# Patient Record
Sex: FEMALE | Race: WHITE | NOT HISPANIC OR LATINO | URBAN - METROPOLITAN AREA
[De-identification: names, ages, dates, MRNs, and addresses within clinical notes are randomized per-mention and may not be internally consistent; named-entity substitution may affect disease eponyms.]

---

## 2017-07-23 ENCOUNTER — EMERGENCY (EMERGENCY)
Facility: HOSPITAL | Age: 82
LOS: 1 days | Discharge: HOME | End: 2017-07-23
Attending: EMERGENCY MEDICINE | Admitting: EMERGENCY MEDICINE
Payer: MEDICARE

## 2017-07-23 VITALS
OXYGEN SATURATION: 100 % | HEART RATE: 115 BPM | TEMPERATURE: 98 F | SYSTOLIC BLOOD PRESSURE: 149 MMHG | RESPIRATION RATE: 19 BRPM | DIASTOLIC BLOOD PRESSURE: 96 MMHG

## 2017-07-23 DIAGNOSIS — Z98.42 CATARACT EXTRACTION STATUS, LEFT EYE: Chronic | ICD-10-CM

## 2017-07-23 DIAGNOSIS — Z90.49 ACQUIRED ABSENCE OF OTHER SPECIFIED PARTS OF DIGESTIVE TRACT: Chronic | ICD-10-CM

## 2017-07-23 PROCEDURE — 21480 CLTX TMPRMAND DISLC 1ST/SBSQ: CPT

## 2017-07-23 PROCEDURE — 99283 EMERGENCY DEPT VISIT LOW MDM: CPT | Mod: 25

## 2017-07-23 PROCEDURE — 70110 X-RAY EXAM OF JAW 4/> VIEWS: CPT | Mod: 26

## 2017-07-23 PROCEDURE — 99284 EMERGENCY DEPT VISIT MOD MDM: CPT | Mod: 25,GC

## 2017-07-23 PROCEDURE — 21480 CLTX TMPRMAND DISLC 1ST/SBSQ: CPT | Mod: 50,GC

## 2017-07-23 PROCEDURE — 70110 X-RAY EXAM OF JAW 4/> VIEWS: CPT

## 2017-07-23 RX ORDER — ONDANSETRON 8 MG/1
4 TABLET, FILM COATED ORAL ONCE
Qty: 0 | Refills: 0 | Status: DISCONTINUED | OUTPATIENT
Start: 2017-07-23 | End: 2017-07-23

## 2017-07-23 NOTE — ED PROVIDER NOTE - CONSTITUTIONAL, MLM
normal... Well appearing, well nourished, awake, alert, oriented to person, place, time/situation and in moderate pain.

## 2017-07-23 NOTE — ED PROVIDER NOTE - OBJECTIVE STATEMENT
82 y/o female p/w jaw dislocatioin. BIBA. Per patient, yawned a little while ago and felt jaw pop. Has happened in the past. Reports severe pain. 82 y/o female p/w jaw dislocation. BIBA. Per patient, yawned a little while ago and felt jaw pop. Has happened in the past. Reports severe pain. Mouth held open. No trauma.

## 2017-07-23 NOTE — ED ADULT NURSE NOTE - OBJECTIVE STATEMENT
0349 pt 83y f aox4 bib [place] c/o jaw dislocation states she yawned and dislocated her jaw w/ pmhx, pt states ortho puts it back but tonight pt in excruciating pain pending to be seen/vss /pending eval/gcruz

## 2017-07-23 NOTE — ED PROVIDER NOTE - ATTENDING CONTRIBUTION TO CARE
Attending MD Avila:  I personally have seen and examined this patient.  Resident note reviewed and agree on plan of care and except where noted.  See MDM for details.

## 2017-07-23 NOTE — ED PROVIDER NOTE - PROGRESS NOTE DETAILS
patient became nauseous after jaw relocated - reports jaw feels relocated, w/ reduced pain - will check XR XR shows jaw relocated - patient feels well w/ no pain - declined zofran b/c nausea resolved - safe to d/c home

## 2017-07-23 NOTE — ED PROVIDER NOTE - PLAN OF CARE
1) Please return to the ED should you have any new or worsening symptoms, worsening pain, develop inability to open or close your mouth or any concerning symptoms  2) Please follow up with your primary care doctor in 2-3 days.

## 2017-07-23 NOTE — ED PROVIDER NOTE - PMH
Anxiety    Depression    Diverticulitis    Hyperlipidemia    Macular degeneration  right eye  MVP (mitral valve prolapse)    OA (osteoarthritis)  knee and hip

## 2017-07-23 NOTE — ED PROVIDER NOTE - MEDICAL DECISION MAKING DETAILS
Christy resident: 82 y/o p/w acute jaw discloation - happened once before in the past - happened today when she yawned - jaw stuck open with jaw visiably dislocated - will try to relocate w/o sedation and obtain f/u xr Christy resident: 84 y/o p/w acute jaw discloation - happened once before in the past - happened today when she yawned - jaw stuck open with jaw visiably dislocated - will try to relocate w/o sedation and obtain f/u xr    84 y/o female with hx of Jaw dislocation here with yet another dislocation. Patient reports she was yawning and felt her jaw popped and got locked. She has been unable to close her mouth since. Exam shows a clearly dislocated jaw and patient with trismus. NO airway compromised. Will reduce the dislocated joint and reassessed. Christy resident: 82 y/o p/w acute jaw discloation - happened once before in the past - happened today when she yawned - jaw stuck open with jaw visiably dislocated - will try to relocate w/o sedation and obtain f/u xr    Austin CANDELARIO: 82 y/o female with hx of Jaw dislocation here with yet another dislocation. Patient reports she was yawning and felt her jaw popped and got locked. She has been unable to close her mouth since. Exam shows a clearly dislocated jaw and patient with trismus. NO airway compromised. Will reduce the dislocated joint and reassessed.

## 2017-07-23 NOTE — ED PROVIDER NOTE - CARE PLAN
Principal Discharge DX:	Jaw dislocation, initial encounter Principal Discharge DX:	Jaw dislocation, initial encounter  Instructions for follow-up, activity and diet:	1) Please return to the ED should you have any new or worsening symptoms, worsening pain, develop inability to open or close your mouth or any concerning symptoms  2) Please follow up with your primary care doctor in 2-3 days.

## 2017-08-05 ENCOUNTER — EMERGENCY (EMERGENCY)
Facility: HOSPITAL | Age: 82
LOS: 1 days | Discharge: HOME | End: 2017-08-05
Attending: EMERGENCY MEDICINE | Admitting: INTERNAL MEDICINE
Payer: MEDICARE

## 2017-08-05 VITALS
DIASTOLIC BLOOD PRESSURE: 88 MMHG | SYSTOLIC BLOOD PRESSURE: 149 MMHG | RESPIRATION RATE: 18 BRPM | HEART RATE: 99 BPM | OXYGEN SATURATION: 99 % | TEMPERATURE: 98 F

## 2017-08-05 DIAGNOSIS — R07.9 CHEST PAIN, UNSPECIFIED: ICD-10-CM

## 2017-08-05 DIAGNOSIS — Z90.49 ACQUIRED ABSENCE OF OTHER SPECIFIED PARTS OF DIGESTIVE TRACT: Chronic | ICD-10-CM

## 2017-08-05 DIAGNOSIS — Z98.42 CATARACT EXTRACTION STATUS, LEFT EYE: Chronic | ICD-10-CM

## 2017-08-05 LAB
ALBUMIN SERPL ELPH-MCNC: 4.5 G/DL — SIGNIFICANT CHANGE UP (ref 3.3–5)
ALP SERPL-CCNC: 113 U/L — SIGNIFICANT CHANGE UP (ref 40–120)
ALT FLD-CCNC: 13 U/L RC — SIGNIFICANT CHANGE UP (ref 10–45)
ANION GAP SERPL CALC-SCNC: 15 MMOL/L — SIGNIFICANT CHANGE UP (ref 5–17)
APPEARANCE UR: CLEAR — SIGNIFICANT CHANGE UP
AST SERPL-CCNC: 21 U/L — SIGNIFICANT CHANGE UP (ref 10–40)
BASOPHILS # BLD AUTO: 0 K/UL — SIGNIFICANT CHANGE UP (ref 0–0.2)
BASOPHILS NFR BLD AUTO: 0.5 % — SIGNIFICANT CHANGE UP (ref 0–2)
BILIRUB SERPL-MCNC: 0.7 MG/DL — SIGNIFICANT CHANGE UP (ref 0.2–1.2)
BILIRUB UR-MCNC: NEGATIVE — SIGNIFICANT CHANGE UP
BUN SERPL-MCNC: 15 MG/DL — SIGNIFICANT CHANGE UP (ref 7–23)
CALCIUM SERPL-MCNC: 9.2 MG/DL — SIGNIFICANT CHANGE UP (ref 8.4–10.5)
CHLORIDE SERPL-SCNC: 103 MMOL/L — SIGNIFICANT CHANGE UP (ref 96–108)
CK MB CFR SERPL CALC: 1.8 NG/ML — SIGNIFICANT CHANGE UP (ref 0–3.8)
CK MB CFR SERPL CALC: 2.3 NG/ML — SIGNIFICANT CHANGE UP (ref 0–3.8)
CK SERPL-CCNC: 43 U/L — SIGNIFICANT CHANGE UP (ref 25–170)
CK SERPL-CCNC: 54 U/L — SIGNIFICANT CHANGE UP (ref 25–170)
CO2 SERPL-SCNC: 24 MMOL/L — SIGNIFICANT CHANGE UP (ref 22–31)
COLOR SPEC: YELLOW — SIGNIFICANT CHANGE UP
CREAT SERPL-MCNC: 0.7 MG/DL — SIGNIFICANT CHANGE UP (ref 0.5–1.3)
DIFF PNL FLD: ABNORMAL
EOSINOPHIL # BLD AUTO: 0.2 K/UL — SIGNIFICANT CHANGE UP (ref 0–0.5)
EOSINOPHIL NFR BLD AUTO: 3 % — SIGNIFICANT CHANGE UP (ref 0–6)
GLUCOSE SERPL-MCNC: 87 MG/DL — SIGNIFICANT CHANGE UP (ref 70–99)
GLUCOSE UR QL: NEGATIVE — SIGNIFICANT CHANGE UP
HCT VFR BLD CALC: 40.8 % — SIGNIFICANT CHANGE UP (ref 34.5–45)
HGB BLD-MCNC: 13.9 G/DL — SIGNIFICANT CHANGE UP (ref 11.5–15.5)
KETONES UR-MCNC: ABNORMAL
LEUKOCYTE ESTERASE UR-ACNC: NEGATIVE — SIGNIFICANT CHANGE UP
LYMPHOCYTES # BLD AUTO: 1.6 K/UL — SIGNIFICANT CHANGE UP (ref 1–3.3)
LYMPHOCYTES # BLD AUTO: 22.9 % — SIGNIFICANT CHANGE UP (ref 13–44)
MCHC RBC-ENTMCNC: 30.8 PG — SIGNIFICANT CHANGE UP (ref 27–34)
MCHC RBC-ENTMCNC: 33.9 GM/DL — SIGNIFICANT CHANGE UP (ref 32–36)
MCV RBC AUTO: 90.7 FL — SIGNIFICANT CHANGE UP (ref 80–100)
MONOCYTES # BLD AUTO: 1 K/UL — HIGH (ref 0–0.9)
MONOCYTES NFR BLD AUTO: 15.2 % — HIGH (ref 2–14)
NEUTROPHILS # BLD AUTO: 4 K/UL — SIGNIFICANT CHANGE UP (ref 1.8–7.4)
NEUTROPHILS NFR BLD AUTO: 58.3 % — SIGNIFICANT CHANGE UP (ref 43–77)
NITRITE UR-MCNC: NEGATIVE — SIGNIFICANT CHANGE UP
PH UR: 5.5 — SIGNIFICANT CHANGE UP (ref 5–8)
PLATELET # BLD AUTO: 169 K/UL — SIGNIFICANT CHANGE UP (ref 150–400)
POTASSIUM SERPL-MCNC: 4 MMOL/L — SIGNIFICANT CHANGE UP (ref 3.5–5.3)
POTASSIUM SERPL-SCNC: 4 MMOL/L — SIGNIFICANT CHANGE UP (ref 3.5–5.3)
PROT SERPL-MCNC: 7.3 G/DL — SIGNIFICANT CHANGE UP (ref 6–8.3)
PROT UR-MCNC: NEGATIVE — SIGNIFICANT CHANGE UP
RBC # BLD: 4.5 M/UL — SIGNIFICANT CHANGE UP (ref 3.8–5.2)
RBC # FLD: 12.5 % — SIGNIFICANT CHANGE UP (ref 10.3–14.5)
SODIUM SERPL-SCNC: 142 MMOL/L — SIGNIFICANT CHANGE UP (ref 135–145)
SP GR SPEC: 1.02 — SIGNIFICANT CHANGE UP (ref 1.01–1.02)
TROPONIN T SERPL-MCNC: <0.01 NG/ML — SIGNIFICANT CHANGE UP (ref 0–0.06)
TROPONIN T SERPL-MCNC: <0.01 NG/ML — SIGNIFICANT CHANGE UP (ref 0–0.06)
UROBILINOGEN FLD QL: NEGATIVE — SIGNIFICANT CHANGE UP
WBC # BLD: 6.8 K/UL — SIGNIFICANT CHANGE UP (ref 3.8–10.5)
WBC # FLD AUTO: 6.8 K/UL — SIGNIFICANT CHANGE UP (ref 3.8–10.5)

## 2017-08-05 RX ORDER — METOPROLOL TARTRATE 50 MG
25 TABLET ORAL
Qty: 0 | Refills: 0 | Status: DISCONTINUED | OUTPATIENT
Start: 2017-08-05 | End: 2017-08-06

## 2017-08-05 RX ORDER — ASPIRIN/CALCIUM CARB/MAGNESIUM 324 MG
81 TABLET ORAL DAILY
Qty: 0 | Refills: 0 | Status: DISCONTINUED | OUTPATIENT
Start: 2017-08-05 | End: 2017-08-06

## 2017-08-05 RX ORDER — ENOXAPARIN SODIUM 100 MG/ML
40 INJECTION SUBCUTANEOUS DAILY
Qty: 0 | Refills: 0 | Status: DISCONTINUED | OUTPATIENT
Start: 2017-08-05 | End: 2017-08-06

## 2017-08-05 RX ORDER — HYDROMORPHONE HYDROCHLORIDE 2 MG/ML
2 INJECTION INTRAMUSCULAR; INTRAVENOUS; SUBCUTANEOUS
Qty: 0 | Refills: 0 | Status: DISCONTINUED | OUTPATIENT
Start: 2017-08-05 | End: 2017-08-06

## 2017-08-05 RX ORDER — SODIUM CHLORIDE 9 MG/ML
500 INJECTION INTRAMUSCULAR; INTRAVENOUS; SUBCUTANEOUS ONCE
Qty: 0 | Refills: 0 | Status: COMPLETED | OUTPATIENT
Start: 2017-08-05 | End: 2017-08-05

## 2017-08-05 RX ORDER — ASPIRIN/CALCIUM CARB/MAGNESIUM 324 MG
324 TABLET ORAL ONCE
Qty: 0 | Refills: 0 | Status: COMPLETED | OUTPATIENT
Start: 2017-08-05 | End: 2017-08-05

## 2017-08-05 RX ORDER — SENNA PLUS 8.6 MG/1
2 TABLET ORAL AT BEDTIME
Qty: 0 | Refills: 0 | Status: DISCONTINUED | OUTPATIENT
Start: 2017-08-05 | End: 2017-08-06

## 2017-08-05 RX ORDER — SIMVASTATIN 20 MG/1
20 TABLET, FILM COATED ORAL AT BEDTIME
Qty: 0 | Refills: 0 | Status: DISCONTINUED | OUTPATIENT
Start: 2017-08-05 | End: 2017-08-06

## 2017-08-05 RX ADMIN — HYDROMORPHONE HYDROCHLORIDE 2 MILLIGRAM(S): 2 INJECTION INTRAMUSCULAR; INTRAVENOUS; SUBCUTANEOUS at 21:00

## 2017-08-05 RX ADMIN — SODIUM CHLORIDE 1000 MILLILITER(S): 9 INJECTION INTRAMUSCULAR; INTRAVENOUS; SUBCUTANEOUS at 17:19

## 2017-08-05 RX ADMIN — SIMVASTATIN 20 MILLIGRAM(S): 20 TABLET, FILM COATED ORAL at 21:00

## 2017-08-05 RX ADMIN — Medication 324 MILLIGRAM(S): at 14:53

## 2017-08-05 RX ADMIN — Medication 0.5 MILLIGRAM(S): at 21:00

## 2017-08-05 RX ADMIN — Medication 25 MILLIGRAM(S): at 21:00

## 2017-08-05 RX ADMIN — HYDROMORPHONE HYDROCHLORIDE 2 MILLIGRAM(S): 2 INJECTION INTRAMUSCULAR; INTRAVENOUS; SUBCUTANEOUS at 21:30

## 2017-08-05 RX ADMIN — ENOXAPARIN SODIUM 40 MILLIGRAM(S): 100 INJECTION SUBCUTANEOUS at 21:00

## 2017-08-05 RX ADMIN — Medication 0.5 MILLIGRAM(S): at 17:19

## 2017-08-05 NOTE — H&P ADULT - NSHPLABSRESULTS_GEN_ALL_CORE
LABS:                        13.9   6.8   )-----------( 169      ( 05 Aug 2017 14:09 )             40.8     08-05    142  |  103  |  15  ----------------------------<  87  4.0   |  24  |  0.70    Ca    9.2      05 Aug 2017 14:09    TPro  7.3  /  Alb  4.5  /  TBili  0.7  /  DBili  x   /  AST  21  /  ALT  13  /  AlkPhos  113  08-05      Urinalysis Basic - ( 05 Aug 2017 15:31 )    Color: Yellow / Appearance: Clear / S.018 / pH: x  Gluc: x / Ketone: Moderate  / Bili: Negative / Urobili: Negative   Blood: x / Protein: Negative / Nitrite: Negative   Leuk Esterase: Negative / RBC: 2-5 /HPF / WBC 2-5 /HPF   Sq Epi: x / Non Sq Epi: x / Bacteria: x          RADIOLOGY & ADDITIONAL TESTS:

## 2017-08-05 NOTE — H&P ADULT - NSHPPHYSICALEXAM_GEN_ALL_CORE
PHYSICAL EXAMINATION:  Vital Signs Last 24 Hrs  T(C): 36.8 (05 Aug 2017 17:51), Max: 36.8 (05 Aug 2017 17:51)  T(F): 98.2 (05 Aug 2017 17:51), Max: 98.2 (05 Aug 2017 17:51)  HR: 88 (05 Aug 2017 17:51) (88 - 99)  BP: 161/90 (05 Aug 2017 17:51) (149/88 - 161/90)  BP(mean): --  RR: 16 (05 Aug 2017 17:51) (16 - 18)  SpO2: 96% (05 Aug 2017 17:51) (96% - 99%)  CAPILLARY BLOOD GLUCOSE            GENERAL: NAD, well-groomed, well-developed  HEAD:  atraumatic, normocephalic  EYES: sclera anicteric  ENMT: mucous membranes moist  NECK: supple, No JVD  CHEST/LUNG: clear to auscultation bilaterally; no rales, rhonchi, or wheezing b/l  HEART: normal S1, S2  ABDOMEN: BS+, soft, ND, NT   EXTREMITIES:  pulses palpable; no clubbing, cyanosis, or edema b/l LEs  NEURO: awake, alert, interactive; moves all extremities  SKIN: no rashes or lesions

## 2017-08-05 NOTE — ED PROVIDER NOTE - ATTENDING CONTRIBUTION TO CARE
Patient presenting with L sided chest pain radiating to back and arm.  Some associated shortness of breath and weakness.  No history of chest pains in past, no known CAD history.    On exam patient vital signs within normal limits, RRR S1/S2, equal pulses and blood pressures in bilateral UE, lungs clear, abdomen soft, non tender, non distended.    EKG non ischemic.  No clinical evidence of aortic dissection, possible ACS, plan for labs, CE, CXR, admit.

## 2017-08-05 NOTE — ED PROVIDER NOTE - OBJECTIVE STATEMENT
83F with PMH of mitral valve disease and depression presenting with one day of 83F with PMH of mitral valve disease and depression presenting with one day of left sided chest pain. The patient reports that the night before presentation she developed sudden sharp left sided chest pain that radiated to her back and left arm. She took some Dilaudid and the pain improved but was still present this morning. The pain is described as sharp and sometimes cramping and constant. The pain is worse with movement. She reports increased shortness of breath with exertion today. Denies any nausea/vomiting, fever/chills, abdominal pain, dysuria and pain or swelling in lower extremities.

## 2017-08-05 NOTE — H&P ADULT - NSHPREVIEWOFSYSTEMS_GEN_ALL_CORE
REVIEW OF SYSTEMS:    CONSTITUTIONAL: No weakness, fevers or chills  EYES/ENT: No visual changes;  No vertigo or throat pain   NECK: No pain or stiffness  RESPIRATORY: No cough, wheezing, hemoptysis; No shortness of breath  CARDIOVASCULAR: chest pain  GASTROINTESTINAL: No abdominal or epigastric pain. No nausea, vomiting, or hematemesis; No diarrhea or constipation. No melena or hematochezia.  GENITOURINARY: No dysuria, frequency or hematuria  NEUROLOGICAL: No numbness or weakness  SKIN: No itching, rashes

## 2017-08-05 NOTE — H&P ADULT - HISTORY OF PRESENT ILLNESS
: 83F with PMH of mitral valve disease and depression presenting with one day of left sided chest pain. The patient reports that the night before presentation she developed sudden sharp left sided chest pain that radiated to her back and left arm. She took some Dilaudid and the pain improved but was still present this morning. The pain is described as sharp and sometimes cramping and constant. The pain is worse with movement. She reports increased shortness of breath with exertion today. Denies any nausea/vomiting, fever/chills, abdominal pain, dysuria and pain or swelling in lower extremities., seen in  er,  card called

## 2017-08-05 NOTE — ED PROVIDER NOTE - PROGRESS NOTE DETAILS
patient and family updated regarding lab results and plan. patient comfortable with being admitted for r/o ACS. Sign out from Dr. Fink- cxr pending, admit.  CXR nad, trop neg.  Admitted.

## 2017-08-05 NOTE — CONSULT NOTE ADULT - SUBJECTIVE AND OBJECTIVE BOX
CHIEF COMPLAINT:Patient is a 83y old  Female who presents with a chief complaint of chest pain    HPI:83F with PMH of mitral valve disease and depression presenting with one day of left sided chest pain. The patient reports that the night before presentation she developed sudden sharp left sided chest pain that radiated to her back and left arm. She took some Dilaudid and the pain improved but was still present this morning. The pain is described as sharp and sometimes cramping and constant. The pain is worse with movement. She reports increased shortness of breath with exertion today. Denies any nausea/vomiting, fever/chills, abdominal pain, dysuria and pain or swelling in lower extremities.      PAST MEDICAL & SURGICAL HISTORY:  Hyperlipidemia  OA (osteoarthritis): knee and hip  MVP (mitral valve prolapse)  Macular degeneration: right eye  Diverticulitis  Depression  Anxiety  S/P left cataract extraction: right and left  Status post cholecystectomy      MEDICATIONS  (STANDING):    MEDICATIONS  (PRN):      FAMILY HISTORY:  No pertinent family history in first degree relatives      SOCIAL HISTORY:    [ ] Non-smoker  [ ] Smoker  [ ] Alcohol    Allergies    No Known Allergies    Intolerances    	    REVIEW OF SYSTEMS:  CONSTITUTIONAL: No fever, weight loss, or fatigue  EYES: No eye pain, visual disturbances, or discharge  ENT:  No difficulty hearing, tinnitus, vertigo; No sinus or throat pain  NECK: No pain or stiffness  RESPIRATORY: No cough, wheezing, chills or hemoptysis; + Shortness of Breath  CARDIOVASCULAR: No chest pain, palpitations, passing out, dizziness, or leg swelling  GASTROINTESTINAL: No abdominal or epigastric pain. No nausea, vomiting, or hematemesis; No diarrhea or constipation. No melena or hematochezia.  GENITOURINARY: No dysuria, frequency, hematuria, or incontinence  NEUROLOGICAL: No headaches, memory loss, loss of strength, numbness, or tremors  SKIN: No itching, burning, rashes, or lesions   LYMPH Nodes: No enlarged glands  ENDOCRINE: No heat or cold intolerance; No hair loss  MUSCULOSKELETAL: No joint pain. swelling; No muscle, back, or extremity pain  PSYCHIATRIC: No depression, anxiety, mood swings, or difficulty sleeping  HEME/LYMPH: No easy bruising, or bleeding gums  ALLERGY AND IMMUNOLOGIC: No hives or eczema	    [ ] All others negative	  [ ] Unable to obtain    PHYSICAL EXAM:  T(C): 36.8 (08-05-17 @ 17:51), Max: 36.8 (08-05-17 @ 17:51)  HR: 88 (08-05-17 @ 17:51) (88 - 99)  BP: 161/90 (08-05-17 @ 17:51) (149/88 - 161/90)  RR: 16 (08-05-17 @ 17:51) (16 - 18)  SpO2: 96% (08-05-17 @ 17:51) (96% - 99%)  Wt(kg): --  I&O's Summary      Appearance: Normal	  HEENT:   Normal oral mucosa, PERRL, EOMI	  Lymphatic: No lymphadenopathy  Cardiovascular: Normal S1 S2, No JVD, Systolc murmur, No edema  Respiratory: Lungs clear to auscultation	  Psychiatry: A & O x 3, Mood & affect appropriate  Gastrointestinal:  Soft, Non-tender, + BS	  Skin: No rashes, No ecchymoses, No cyanosis	  Neurologic: Non-focal  Extremities: Normal range of motion, No clubbing, cyanosis or edema  Vascular: Peripheral pulses palpable 2+ bilaterally    TELEMETRY: 	    ECG:  	  RADIOLOGY:  OTHER: 	  	  LABS:	 	    CARDIAC MARKERS:  CARDIAC MARKERS ( 05 Aug 2017 14:09 )  x     / <0.01 ng/mL / 54 U/L / x     / 2.3 ng/mL    12 Lead ECG (11.26.15 @ 10:30) >  INUS TACHYCARDIA  POSSIBLE LEFT ATRIAL ENLARGEMENT  LEFT VENTRICULAR HYPERTROPy                            13.9   6.8   )-----------( 169      ( 05 Aug 2017 14:09 )             40.8     08-05    142  |  103  |  15  ----------------------------<  87  4.0   |  24  |  0.70    Ca    9.2      05 Aug 2017 14:09    TPro  7.3  /  Alb  4.5  /  TBili  0.7  /  DBili  x   /  AST  21  /  ALT  13  /  AlkPhos  113  08-05    proBNP:   Lipid Profile:   HgA1c:   TSH:       < from: Xray Chest 1 View AP- PORTABLE-Urgent (08.05.17 @ 17:11) >  Cardiac size is within normal limits.   The lungs are clear.   No pleural effusion.   No pneumothorax.   Degenerative changes of thoracic spine.   Surgical clips noted on the right upper quadrant.    IMPRESSION:   Clear lungs.     < end of copied text >

## 2017-08-05 NOTE — ED PROVIDER NOTE - PHYSICAL EXAMINATION
General: well appearing female, appears anxious   HEENT: normocephalic, atraumatic   Respiratory: normal work of breathing, lungs clear to auscultation bilaterally   Cardiac: normal rate and rhythm  Abdominal: soft, non-tender   MSK: no pain or swelling in lower extremities

## 2017-08-05 NOTE — ED ADULT NURSE NOTE - OBJECTIVE STATEMENT
83 year old female presented to ED with c/o of chest tightness radiating to the back and left arm, starting yesterday at 5pm. Pt states she is nauseas, have not vomited. Pt denies SOB, dizziness, blurred vision, headache, cough, fever, chills. Pt a&ox3, lungs clear, heart rate regular, rhythm normal sinus, abdomen soft nontender nondistended to palp. Skin intact. No edema noted in extremities. IV in left AC 20G and patent. Pt currently resting in bed with family at bedside, side rails up for safety. Will continue to monitor and assess while offering support and reassurance.

## 2017-08-05 NOTE — ED ADULT NURSE NOTE - CHPI ED SYMPTOMS NEG
no shortness of breath/no syncope/no dizziness/no chills/no cough/no diaphoresis/no vomiting/no fever

## 2017-08-05 NOTE — ED PROVIDER NOTE - MEDICAL DECISION MAKING DETAILS
83Fwith HLD and MVP presenting with one day of left sided chest pain radiating to back and left arm. No nausea/vomiting, SOB. Low concern for PE, dissection and PNA. Concern for ACS. Plan for ekg, cardiac enzymes, cxr, cbc/cmp, ua. likely admit for ACS rule out.

## 2017-08-06 ENCOUNTER — TRANSCRIPTION ENCOUNTER (OUTPATIENT)
Age: 82
End: 2017-08-06

## 2017-08-06 VITALS
TEMPERATURE: 98 F | HEART RATE: 71 BPM | SYSTOLIC BLOOD PRESSURE: 150 MMHG | DIASTOLIC BLOOD PRESSURE: 75 MMHG | OXYGEN SATURATION: 98 % | RESPIRATION RATE: 18 BRPM

## 2017-08-06 LAB
ANION GAP SERPL CALC-SCNC: 15 MMOL/L — SIGNIFICANT CHANGE UP (ref 5–17)
BUN SERPL-MCNC: 13 MG/DL — SIGNIFICANT CHANGE UP (ref 7–23)
CALCIUM SERPL-MCNC: 8.8 MG/DL — SIGNIFICANT CHANGE UP (ref 8.4–10.5)
CHLORIDE SERPL-SCNC: 102 MMOL/L — SIGNIFICANT CHANGE UP (ref 96–108)
CO2 SERPL-SCNC: 22 MMOL/L — SIGNIFICANT CHANGE UP (ref 22–31)
CREAT SERPL-MCNC: 0.56 MG/DL — SIGNIFICANT CHANGE UP (ref 0.5–1.3)
GLUCOSE SERPL-MCNC: 92 MG/DL — SIGNIFICANT CHANGE UP (ref 70–99)
HCT VFR BLD CALC: 40.7 % — SIGNIFICANT CHANGE UP (ref 34.5–45)
HGB BLD-MCNC: 13 G/DL — SIGNIFICANT CHANGE UP (ref 11.5–15.5)
MCHC RBC-ENTMCNC: 28.6 PG — SIGNIFICANT CHANGE UP (ref 27–34)
MCHC RBC-ENTMCNC: 31.9 GM/DL — LOW (ref 32–36)
MCV RBC AUTO: 89.8 FL — SIGNIFICANT CHANGE UP (ref 80–100)
PLATELET # BLD AUTO: 163 K/UL — SIGNIFICANT CHANGE UP (ref 150–400)
POTASSIUM SERPL-MCNC: 4 MMOL/L — SIGNIFICANT CHANGE UP (ref 3.5–5.3)
POTASSIUM SERPL-SCNC: 4 MMOL/L — SIGNIFICANT CHANGE UP (ref 3.5–5.3)
RBC # BLD: 4.53 M/UL — SIGNIFICANT CHANGE UP (ref 3.8–5.2)
RBC # FLD: 12.4 % — SIGNIFICANT CHANGE UP (ref 10.3–14.5)
SODIUM SERPL-SCNC: 139 MMOL/L — SIGNIFICANT CHANGE UP (ref 135–145)
TROPONIN T SERPL-MCNC: <0.01 NG/ML — SIGNIFICANT CHANGE UP (ref 0–0.06)
WBC # BLD: 5.5 K/UL — SIGNIFICANT CHANGE UP (ref 3.8–10.5)
WBC # FLD AUTO: 5.5 K/UL — SIGNIFICANT CHANGE UP (ref 3.8–10.5)

## 2017-08-06 PROCEDURE — 82553 CREATINE MB FRACTION: CPT

## 2017-08-06 PROCEDURE — 82550 ASSAY OF CK (CPK): CPT

## 2017-08-06 PROCEDURE — 84484 ASSAY OF TROPONIN QUANT: CPT

## 2017-08-06 PROCEDURE — 81001 URINALYSIS AUTO W/SCOPE: CPT

## 2017-08-06 PROCEDURE — 74174 CTA ABD&PLVS W/CONTRAST: CPT

## 2017-08-06 PROCEDURE — 80048 BASIC METABOLIC PNL TOTAL CA: CPT

## 2017-08-06 PROCEDURE — 71275 CT ANGIOGRAPHY CHEST: CPT

## 2017-08-06 PROCEDURE — 80053 COMPREHEN METABOLIC PANEL: CPT

## 2017-08-06 PROCEDURE — 99285 EMERGENCY DEPT VISIT HI MDM: CPT

## 2017-08-06 PROCEDURE — 85027 COMPLETE CBC AUTOMATED: CPT

## 2017-08-06 PROCEDURE — 71045 X-RAY EXAM CHEST 1 VIEW: CPT

## 2017-08-06 RX ORDER — METOPROLOL TARTRATE 50 MG
1 TABLET ORAL
Qty: 60 | Refills: 0 | OUTPATIENT
Start: 2017-08-06 | End: 2017-09-05

## 2017-08-06 RX ORDER — SIMVASTATIN 20 MG/1
1 TABLET, FILM COATED ORAL
Qty: 30 | Refills: 0
Start: 2017-08-06 | End: 2017-09-05

## 2017-08-06 RX ORDER — HYDROMORPHONE HYDROCHLORIDE 2 MG/ML
1 INJECTION INTRAMUSCULAR; INTRAVENOUS; SUBCUTANEOUS
Qty: 0 | Refills: 0 | DISCHARGE
Start: 2017-08-06

## 2017-08-06 RX ORDER — SIMVASTATIN 20 MG/1
1 TABLET, FILM COATED ORAL
Qty: 30 | Refills: 0 | OUTPATIENT
Start: 2017-08-06 | End: 2017-09-05

## 2017-08-06 RX ORDER — METOPROLOL TARTRATE 50 MG
1 TABLET ORAL
Qty: 60 | Refills: 0
Start: 2017-08-06 | End: 2017-09-05

## 2017-08-06 RX ORDER — ASPIRIN/CALCIUM CARB/MAGNESIUM 324 MG
1 TABLET ORAL
Qty: 0 | Refills: 0 | DISCHARGE
Start: 2017-08-06

## 2017-08-06 RX ADMIN — ENOXAPARIN SODIUM 40 MILLIGRAM(S): 100 INJECTION SUBCUTANEOUS at 11:30

## 2017-08-06 RX ADMIN — HYDROMORPHONE HYDROCHLORIDE 2 MILLIGRAM(S): 2 INJECTION INTRAMUSCULAR; INTRAVENOUS; SUBCUTANEOUS at 05:40

## 2017-08-06 RX ADMIN — HYDROMORPHONE HYDROCHLORIDE 2 MILLIGRAM(S): 2 INJECTION INTRAMUSCULAR; INTRAVENOUS; SUBCUTANEOUS at 05:06

## 2017-08-06 RX ADMIN — Medication 81 MILLIGRAM(S): at 08:24

## 2017-08-06 RX ADMIN — Medication 25 MILLIGRAM(S): at 05:07

## 2017-08-06 RX ADMIN — Medication 0.5 MILLIGRAM(S): at 05:06

## 2017-08-06 NOTE — DISCHARGE NOTE ADULT - MEDICATION SUMMARY - MEDICATIONS TO TAKE
I will START or STAY ON the medications listed below when I get home from the hospital:    acetaminophen-oxyCODONE 325 mg-5 mg oral tablet  -- 1 tab(s) by mouth every 4 hours, As needed, Moderate Pain  -- Indication: For pain    acetaminophen 325 mg oral tablet  -- 2 tab(s) by mouth every 6 hours, As needed, For Temp over 37.9 C (100.2 F)  -- Indication: For pain    acetaminophen 325 mg oral tablet  -- 2 tab(s) by mouth every 6 hours, As needed, Mild Pain  -- Indication: For pain    traMADol 50 mg oral tablet  -- 1 tab(s) by mouth every 4 hours, As Needed  -- Indication: For pain    aspirin 81 mg oral delayed release tablet  -- 1 tab(s) by mouth once a day  -- Indication: For supplement    HYDROmorphone 2 mg oral tablet  -- 1 tab(s) by mouth 2 times a day  -- Indication: For pain    Ativan 0.5 mg oral tablet  -- 1 tab(s) by mouth 3 times a day, As Needed  -- Indication: For anxiety     simvastatin 20 mg oral tablet  -- 1 tab(s) by mouth once a day (at bedtime)  -- Indication: For hld    metoprolol tartrate 25 mg oral tablet  -- 1 tab(s) by mouth 2 times a day  -- Indication: For htn    docusate sodium 100 mg oral capsule  -- 1 cap(s) by mouth 3 times a day, As Needed  -- as needed for constipation, may use OTC formula  -- Indication: For Constipation    senna oral tablet  -- 2 tab(s) by mouth once a day (at bedtime), As Needed  -- use PRN constipation and may use OTC formula   -- Indication: For Constipation    Eylea 40 mg/mL intraocular solution  --  intraocular  injected every 6 weeks  -- Indication: For Retina disease

## 2017-08-06 NOTE — PROGRESS NOTE ADULT - SUBJECTIVE AND OBJECTIVE BOX
CARDIOLOGY     PROGRESS  NOTE   ________________________________________________    CHIEF COMPLAINT:Patient is a 83y old  Female who presents with a chief complaint of chest pain (06 Aug 2017 05:10)  doing better.    	  REVIEW OF SYSTEMS:  CONSTITUTIONAL: No fever, weight loss, or fatigue  EYES: No eye pain, visual disturbances, or discharge  ENT:  No difficulty hearing, tinnitus, vertigo; No sinus or throat pain  NECK: No pain or stiffness  RESPIRATORY: No cough, wheezing, chills or hemoptysis; no Shortness of Breath  CARDIOVASCULAR: No chest pain, palpitations, passing out, dizziness, or leg swelling  GASTROINTESTINAL: No abdominal or epigastric pain. No nausea, vomiting, or hematemesis; No diarrhea or constipation. No melena or hematochezia.  GENITOURINARY: No dysuria, frequency, hematuria, or incontinence  NEUROLOGICAL: No headaches, memory loss, loss of strength, numbness, or tremors  SKIN: No itching, burning, rashes, or lesions   LYMPH Nodes: No enlarged glands  ENDOCRINE: No heat or cold intolerance; No hair loss  MUSCULOSKELETAL: No joint pain or swelling; No muscle, back, or extremity pain  PSYCHIATRIC: No depression, anxiety, mood swings, or difficulty sleeping  HEME/LYMPH: No easy bruising, or bleeding gums  ALLERGY AND IMMUNOLOGIC: No hives or eczema	    [ ] All others negative	  [ ] Unable to obtain    PHYSICAL EXAM:  T(C): 36.6 (08-06-17 @ 04:46), Max: 36.8 (08-05-17 @ 17:51)  HR: 75 (08-06-17 @ 04:46) (75 - 99)  BP: 160/79 (08-06-17 @ 04:46) (149/88 - 167/82)  RR: 18 (08-06-17 @ 04:46) (16 - 18)  SpO2: 97% (08-06-17 @ 04:46) (96% - 99%)  Wt(kg): --  I&O's Summary    05 Aug 2017 07:01  -  06 Aug 2017 07:00  --------------------------------------------------------  IN: 120 mL / OUT: 600 mL / NET: -480 mL    06 Aug 2017 07:01  -  06 Aug 2017 09:58  --------------------------------------------------------  IN: 240 mL / OUT: 300 mL / NET: -60 mL        Appearance: Normal	  HEENT:   Normal oral mucosa, PERRL, EOMI	  Lymphatic: No lymphadenopathy  Cardiovascular: Normal S1 S2, No JVD, No murmurs, No edema  Respiratory: Lungs clear to auscultation	  Psychiatry: A & O x 3, Mood & affect appropriate  Gastrointestinal:  Soft, Non-tender, + BS	  Skin: No rashes, No ecchymoses, No cyanosis	  Neurologic: Non-focal  Extremities: Normal range of motion, No clubbing, cyanosis or edema  Vascular: Peripheral pulses palpable 2+ bilaterally    MEDICATIONS  (STANDING):  metoprolol 25 milliGRAM(s) Oral two times a day  LORazepam     Tablet 0.5 milliGRAM(s) Oral three times a day  senna 2 Tablet(s) Oral at bedtime  enoxaparin Injectable 40 milliGRAM(s) SubCutaneous daily  aspirin enteric coated 81 milliGRAM(s) Oral daily  simvastatin 20 milliGRAM(s) Oral at bedtime  HYDROmorphone   Tablet 2 milliGRAM(s) Oral two times a day      TELEMETRY: 	    ECG:  	  RADIOLOGY:  OTHER: 	  	  LABS:	 	    CARDIAC MARKERS:  CARDIAC MARKERS ( 06 Aug 2017 06:26 )  x     / <0.01 ng/mL / x     / x     / x      CARDIAC MARKERS ( 05 Aug 2017 21:45 )  x     / <0.01 ng/mL / 43 U/L / x     / 1.8 ng/mL  CARDIAC MARKERS ( 05 Aug 2017 14:09 )  x     / <0.01 ng/mL / 54 U/L / x     / 2.3 ng/mL                                13.9   6.8   )-----------( 169      ( 05 Aug 2017 14:09 )             40.8     08-06    139  |  102  |  13  ----------------------------<  92  4.0   |  22  |  0.56    Ca    8.8      06 Aug 2017 06:26    TPro  7.3  /  Alb  4.5  /  TBili  0.7  /  DBili  x   /  AST  21  /  ALT  13  /  AlkPhos  113  08-05    proBNP:   Lipid Profile:   HgA1c:   TSH:         Assessment a< from: CT Lumbar Spine w/o Cont (12.04.15 @ 13:18) >  IMPRESSION: Stable appearing moderate to severe compression fracture of   the superior endplate of L2. Retropulsed bone [id number]% of the spinal   canal diameter.    Stable mild compression deformity of the L1 vertebral body.    The remaining vertebral bodies demonstrate normal height. Vertebral   alignment maintained.    < end of copied text >    A/P  awaiting cta  bp control will add ace  echo

## 2017-08-06 NOTE — DISCHARGE NOTE ADULT - CARE PROVIDER_API CALL
Pa Solares (MD), Internal Medicine  [facility] [place] [address]  Phone: [phone]  Fax: [phone]    Guille Cha (), Cardiology Medicine  [address] [place] [address]  Phone: [phone]  Fax: [phone]

## 2017-08-06 NOTE — DISCHARGE NOTE ADULT - PATIENT PORTAL LINK FT
“You can access the [facility] Patient Portal, offered by [facility], by registering with the following website: [email or web address]”

## 2017-08-06 NOTE — DISCHARGE NOTE ADULT - CARE PLAN
Principal Discharge DX:	Chest pain, unspecified type  Goal:	Symptoms resolved  Instructions for follow-up, activity and diet:	Please follow up with  in 1-2 weeks  Echo to be completed outpatient  Secondary Diagnosis:	Anxiety  Instructions for follow-up, activity and diet:	Please continue medications as prescribed  Secondary Diagnosis:	Pure hyperglyceridemia  Instructions for follow-up, activity and diet:	Please continue all medications as prescribed

## 2017-08-06 NOTE — PROGRESS NOTE ADULT - SUBJECTIVE AND OBJECTIVE BOX
SUBJECTIVE / OVERNIGHT EVENTS: No nausea, vomiting or diarrhea, no fever or chills, no dizziness or chest pain, no dysuria or hematuria .      CAPILLARY BLOOD GLUCOSE        I&O's Summary    05 Aug 2017 07:  -  06 Aug 2017 07:00  --------------------------------------------------------  IN: 120 mL / OUT: 600 mL / NET: -480 mL    06 Aug 2017 07:  -  06 Aug 2017 09:11  --------------------------------------------------------  IN: 240 mL / OUT: 300 mL / NET: -60 mL        PHYSICAL EXAM:  HEAD:  Atraumatic, Normocephalic  EYES: EOMI, PERRLA, conjunctiva and sclera clear  NECK: Supple, No JVD  CHEST/LUNG: Clear to auscultation bilaterally; No wheeze  HEART: Regular rate and rhythm; No murmurs, rubs, or gallops  ABDOMEN: Soft, Nontender, Nondistended; Bowel sounds present  EXTREMITIES:  2+ Peripheral Pulses, No clubbing, cyanosis, or edema  PSYCH: AAOx3  NEUROLOGY: non-focal  SKIN: No rashes or lesions    MEDICATIONS  (STANDING):  metoprolol 25 milliGRAM(s) Oral two times a day  LORazepam     Tablet 0.5 milliGRAM(s) Oral three times a day  senna 2 Tablet(s) Oral at bedtime  enoxaparin Injectable 40 milliGRAM(s) SubCutaneous daily  aspirin enteric coated 81 milliGRAM(s) Oral daily  simvastatin 20 milliGRAM(s) Oral at bedtime  HYDROmorphone   Tablet 2 milliGRAM(s) Oral two times a day    MEDICATIONS  (PRN):      LABS:                        13.9   6.8   )-----------( 169      ( 05 Aug 2017 14:09 )             40.8     08-06    139  |  102  |  13  ----------------------------<  92  4.0   |  22  |  0.56    Ca    8.8      06 Aug 2017 06:26    TPro  7.3  /  Alb  4.5  /  TBili  0.7  /  DBili  x   /  AST  21  /  ALT  13  /  AlkPhos  113  08-05      CARDIAC MARKERS ( 06 Aug 2017 06:26 )  x     / <0.01 ng/mL / x     / x     / x      CARDIAC MARKERS ( 05 Aug 2017 21:45 )  x     / <0.01 ng/mL / 43 U/L / x     / 1.8 ng/mL  CARDIAC MARKERS ( 05 Aug 2017 14:09 )  x     / <0.01 ng/mL / 54 U/L / x     / 2.3 ng/mL      Urinalysis Basic - ( 05 Aug 2017 15:31 )    Color: Yellow / Appearance: Clear / S.018 / pH: x  Gluc: x / Ketone: Moderate  / Bili: Negative / Urobili: Negative   Blood: x / Protein: Negative / Nitrite: Negative   Leuk Esterase: Negative / RBC: 2-5 /HPF / WBC 2-5 /HPF   Sq Epi: x / Non Sq Epi: x / Bacteria: x                  Cultures:    EKG:    Radiological Studies:    Consultant(s) Notes Reviewed:      Care Discussed with Consultants/Other Providers:

## 2017-08-06 NOTE — DISCHARGE NOTE ADULT - HOSPITAL COURSE
s/p atypical  cp, resolved    has  had cardiac w/p, recently, negative    seen by card hers   tele, nsr   pt  walking  well    c/  back pain,  oa  of spine  .  on  dilaudid  at  home

## 2017-08-21 ENCOUNTER — OUTPATIENT (OUTPATIENT)
Dept: OUTPATIENT SERVICES | Facility: HOSPITAL | Age: 82
LOS: 1 days | Discharge: HOME | End: 2017-08-21
Payer: MEDICARE

## 2017-08-21 DIAGNOSIS — Z90.49 ACQUIRED ABSENCE OF OTHER SPECIFIED PARTS OF DIGESTIVE TRACT: Chronic | ICD-10-CM

## 2017-08-21 DIAGNOSIS — Z98.42 CATARACT EXTRACTION STATUS, LEFT EYE: Chronic | ICD-10-CM

## 2017-08-22 DIAGNOSIS — F33.1 MAJOR DEPRESSIVE DISORDER, RECURRENT, MODERATE: ICD-10-CM

## 2017-09-25 PROCEDURE — 99214 OFFICE O/P EST MOD 30 MIN: CPT

## 2018-10-29 NOTE — CONSULT NOTE ADULT - ASSESSMENT
[FreeTextEntry1] : 1) HTN: diastolic slightly elevated however patient did not take BP medications. Patient to take medications, refills given to patient. Cardiac and neurologic system unremarkable\par 2) Bladder prolapse: Patient to f/u with gynecologist, will check UA and culture for UTI given that patient has foul smelling urine\par 3)Hyperlipidemia: Check lipid panel, advised low fat diet and exercise\par 4)Hypokalemia: Potassium supplement reordered and sent to pharmacy\par 5)Depression: stable, PHQ-2 screening negative. Sertraline refill sent to pharmacy\par Influenza administered today, RTO in 3 months.  pt with new onset of chest pain radiating to the back  cta of chest  cie  asa  beta bklocker  echo  repeat ecg  tele

## 2019-11-05 ENCOUNTER — APPOINTMENT (OUTPATIENT)
Dept: RADIOLOGY | Facility: CLINIC | Age: 84
End: 2019-11-05
Payer: MEDICARE

## 2019-11-05 ENCOUNTER — OUTPATIENT (OUTPATIENT)
Dept: OUTPATIENT SERVICES | Facility: HOSPITAL | Age: 84
LOS: 1 days | End: 2019-11-05
Payer: MEDICARE

## 2019-11-05 DIAGNOSIS — Z98.42 CATARACT EXTRACTION STATUS, LEFT EYE: Chronic | ICD-10-CM

## 2019-11-05 DIAGNOSIS — Z90.49 ACQUIRED ABSENCE OF OTHER SPECIFIED PARTS OF DIGESTIVE TRACT: Chronic | ICD-10-CM

## 2019-11-05 DIAGNOSIS — Z00.8 ENCOUNTER FOR OTHER GENERAL EXAMINATION: ICD-10-CM

## 2019-11-05 PROCEDURE — 71046 X-RAY EXAM CHEST 2 VIEWS: CPT

## 2019-11-05 PROCEDURE — 71046 X-RAY EXAM CHEST 2 VIEWS: CPT | Mod: 26

## 2020-03-28 ENCOUNTER — TRANSCRIPTION ENCOUNTER (OUTPATIENT)
Age: 85
End: 2020-03-28

## 2020-07-15 ENCOUNTER — TRANSCRIPTION ENCOUNTER (OUTPATIENT)
Age: 85
End: 2020-07-15

## 2021-01-04 NOTE — ED PROVIDER NOTE - NS ED MD DISPO SPECIAL CONSIDERATION1
Continuity of Care Form    Patient Name: Carlo Hernadez   :  1962  MRN:  [id number]    Admit date:  2021  Discharge date:  2020    Code Status Order: Full Code   Advance Directives:   Advance Care Flowsheet Documentation       Date/Time Healthcare Directive Type of Healthcare Directive Copy in [address] Agent's Name Healthcare Agent's Phone Number    [phone]  No, patient does not have an advance directive for healthcare treatment -- -- -- -- --            Admitting Physician:  Adalid Caldera MD  PCP: No primary care provider on file. Discharging Nurse: [facility] Unit/Room#: 9120/8384-47  Discharging Unit Phone Number: [phone]    Emergency Contact:   Extended Emergency Contact Information  Primary Emergency Contact: Conrad Thomasmar  Address: [address], [address] [address]  Home Phone: [phone]  Relation: Parent  Secondary Emergency Contact: Amy Velasquez, [address]  Home Phone: [phone]  Relation: Other    Past Surgical History:  Past Surgical History:   Procedure Laterality Date    COLONOSCOPY         Immunization History: There is no immunization history on file for this patient.     Active Problems:  Patient Active Problem List   Diagnosis Code    Nonspecific abnormal serum enzyme levels R74.9    Chronic schizoaffective disorder with acute exacerbation ([facility] [address] F25.9    Vitamin D deficiency E55.9    Nonspecific ST-T wave electrocardiographic changes R94.31    Abnormal EKG R94.31    COVID-19 U07.1       Isolation/Infection:   Isolation            Droplet Plus          Patient Infection Status       Infection Onset Added Last Indicated Last Indicated By Review Planned Expiration Resolved Resolved By    COVID-19 21 COVID-19 01/10/21 01/16/21      Resolved    COVID-19 Rule Out 21 COVID-19 (Ordered)   21 Rule-Out Test Resulted Nurse Assessment:  Last Vital Signs: /89   Pulse 84   Temp 97.5 °F (36.4 °C) (Oral)   Resp 16   Ht 5' 11\" (1.803 m)   Wt 209 lb 3.5 oz (94.9 kg)   SpO2 98%   BMI 29.18 kg/m²     Last documented pain score (0-10 scale): Pain Level: 3  Last Weight:   Wt Readings from Last 1 Encounters:   01/02/21 209 lb 3.5 oz (94.9 kg)     Mental Status:  oriented    IV Access:  - None    Nursing Mobility/ADLs:  Walking   Dependent  Transfer  Dependent  Bathing  Dependent  Dressing  Dependent  Toileting  Dependent  Feeding  Dependent  Med Admin  Dependent  Med Delivery   whole    Wound Care Documentation and Therapy:        Elimination:  Continence:   · Bowel: No  · Bladder: No  Urinary Catheter: None   Colostomy/Ileostomy/Ileal Conduit: No       Date of Last BM: 1/4/20    Intake/Output Summary (Last 24 hours) at 1/4/2021 1130  Last data filed at 1/4/2021 1025  Gross per 24 hour   Intake 570 ml   Output --   Net 570 ml     I/O last 3 completed shifts: In: 12 [P.O.:560]  Out: -     Safety Concerns:     History of Falls (last 30 days) and At Risk for Falls    Impairments/Disabilities:    Weakness in all extremities    Nutrition Therapy:  Current Nutrition Therapy:   - Oral Diet:  Cardiac    Routes of Feeding: Oral  Liquids: No Restrictions  Daily Fluid Restriction: no  Last Modified Barium Swallow with Video (Video Swallowing Test): not done    Treatments at the Time of Hospital Discharge:   Respiratory Treatments: NA  Oxygen Therapy:  is not on home oxygen therapy.   Ventilator:    - No ventilator support    Rehab Therapies: Physical Therapy and Occupational Therapy  Weight Bearing Status/Restrictions: No weight bearing restirctions  Other Medical Equipment (for information only, NOT a DME order):  wheelchair  Other Treatments: NA    Patient's personal belongings (please select all that are sent with patient):  None    RN SIGNATURE:  Electronically signed by Liss Boone RN on 1/4/21 at 2:17 PM EST    CASE MANAGEMENT/SOCIAL WORK SECTION    Inpatient Status Date: ***    Readmission Risk Assessment Score:  Readmission Risk              Risk of Unplanned Readmission:        8           Discharging to Facility/ Agency   Name:   [facility]  [address]            [address] [place] [address] [address] [facility] [address]       Phone: [phone]       Fax: [phone]        ·     / signature: Electronically signed by Mer Orozco RN on 1/4/21 at 12:46 PM EST    PHYSICIAN SECTION    Prognosis: Fair    Condition at Discharge: Stable    Rehab Potential (if transferring to Rehab): Fair    Recommended Labs or Other Treatments After Discharge: PT/OT consult. Physician Certification: I certify the above information and transfer of Oralee Haylee  is necessary for the continuing treatment of the diagnosis listed and that he requires [facility] for less 30 days.      Update Admission H&P: No change in H&P    PHYSICIAN SIGNATURE:  Electronically signed by Conrad Moon MD on 1/4/21 at 11:30 AM EST None

## 2021-01-05 ENCOUNTER — NON-APPOINTMENT (OUTPATIENT)
Age: 86
End: 2021-01-05

## 2021-01-05 ENCOUNTER — APPOINTMENT (OUTPATIENT)
Dept: INTERNAL MEDICINE | Facility: CLINIC | Age: 86
End: 2021-01-05
Payer: MEDICARE

## 2021-01-05 ENCOUNTER — LABORATORY RESULT (OUTPATIENT)
Age: 86
End: 2021-01-05

## 2021-01-05 VITALS
HEART RATE: 104 BPM | OXYGEN SATURATION: 97 % | WEIGHT: 160 LBS | TEMPERATURE: 97.52 F | BODY MASS INDEX: 25.11 KG/M2 | HEIGHT: 67 IN | SYSTOLIC BLOOD PRESSURE: 146 MMHG | DIASTOLIC BLOOD PRESSURE: 84 MMHG

## 2021-01-05 DIAGNOSIS — Z87.81 PERSONAL HISTORY OF (HEALED) TRAUMATIC FRACTURE: ICD-10-CM

## 2021-01-05 DIAGNOSIS — Z00.00 ENCOUNTER FOR GENERAL ADULT MEDICAL EXAMINATION W/OUT ABNORMAL FINDINGS: ICD-10-CM

## 2021-01-05 PROCEDURE — 93000 ELECTROCARDIOGRAM COMPLETE: CPT | Mod: 59

## 2021-01-05 PROCEDURE — G0439: CPT

## 2021-01-05 PROCEDURE — G0444 DEPRESSION SCREEN ANNUAL: CPT | Mod: 59

## 2021-01-05 PROCEDURE — 36415 COLL VENOUS BLD VENIPUNCTURE: CPT

## 2021-01-05 NOTE — ASSESSMENT
[FreeTextEntry1] : HCM\par Labs today\par Advised bone density\par Advised cardio fuv\par FLu shot utd\par Will consider pneumovax and prevnar, shingrix\par Will retrieve records from Dr Solares

## 2021-01-05 NOTE — HEALTH RISK ASSESSMENT
[Good] : ~his/her~  mood as  good [] : No [No] : In the past 12 months have you used drugs other than those required for medical reasons? No [No falls in past year] : Patient reported no falls in the past year [de-identified] : as tolerated [de-identified] : regular [None] : None [With Family] : lives with family [Feels Safe at Home] : Feels safe at home [Fully functional (bathing, dressing, toileting, transferring, walking, feeding)] : Fully functional (bathing, dressing, toileting, transferring, walking, feeding) [Fully functional (using the telephone, shopping, preparing meals, housekeeping, doing laundry, using] : Fully functional and needs no help or supervision to perform IADLs (using the telephone, shopping, preparing meals, housekeeping, doing laundry, using transportation, managing medications and managing finances) [Reports changes in hearing] : Reports no changes in hearing [Reports changes in vision] : Reports no changes in vision [Reports changes in dental health] : Reports no changes in dental health [Smoke Detector] : smoke detector [Carbon Monoxide Detector] : carbon monoxide detector [Seat Belt] :  uses seat belt

## 2021-01-05 NOTE — HISTORY OF PRESENT ILLNESS
[FreeTextEntry1] : Physical [de-identified] : ISIS NELSON is an 85 yo woman is an 85 yo woman with hypertension, depression/anxiety, vertebral fracture, macular degeneration, arthritis here for a first physical.  She has been well overall.  Reports anxiety and depressive symptoms.  Did not do well with lexapro and zoloft in the past.  Willing to try new med.  Cymbalta is in pt's chart but she reports that she never took it.  Takes ativan less than twice weekly prn. Requests refill.  Istop was reviewed today.\par \par Has seen cardiology Dr Daigle in the past.  Advised fuv for history of abnormal ekg and MVP\par \par The patient recovered from covid 19 in April 2020.  She was not hospitalized.  The patient has macular degeneration, has injections in eyes every 6 weeks.  Follows up regularly with Dr Juarez.  Has a history of vertebral fracture, no trauma, had kyphoplasty. Has seen Dr Chong and injecctions from Dr XENIA Dailey [facility].  Has OA of knees and sees Dr Cody\par \par The patient is  with one daughter and one grandchild.  She is retired from the insurance.  She lives with her daughter Smiley and Smiley's .  She is seen with Smiley today.  The patient can walk short distances with a cane.  Limited due to back and knee pain.

## 2021-01-05 NOTE — PHYSICAL EXAM
[No Acute Distress] : no acute distress [Well Nourished] : well nourished [Well Developed] : well developed [Well-Appearing] : well-appearing [Normal Sclera/Conjunctiva] : normal sclera/conjunctiva [EOMI] : extraocular movements intact [Normal Outer Ear/Nose] : the outer ears and nose were normal in appearance [Normal Oropharynx] : the oropharynx was normal [Normal TMs] : both tympanic membranes were normal [Normal Nasal Mucosa] : the nasal mucosa was normal [No Lymphadenopathy] : no lymphadenopathy [Supple] : supple [Thyroid Normal, No Nodules] : the thyroid was normal and there were no nodules present [No Respiratory Distress] : no respiratory distress  [No Accessory Muscle Use] : no accessory muscle use [Clear to Auscultation] : lungs were clear to auscultation bilaterally [Normal Rate] : normal rate  [Regular Rhythm] : with a regular rhythm [Normal S1, S2] : normal S1 and S2 [No Edema] : there was no peripheral edema [Normal Appearance] : normal in appearance [No Nipple Discharge] : no nipple discharge [No Axillary Lymphadenopathy] : no axillary lymphadenopathy [Soft] : abdomen soft [Non Tender] : non-tender [Non-distended] : non-distended [No Masses] : no abdominal mass palpated [Normal Bowel Sounds] : normal bowel sounds [Normal Supraclavicular Nodes] : no supraclavicular lymphadenopathy [Normal Posterior Cervical Nodes] : no posterior cervical lymphadenopathy [Normal Anterior Cervical Nodes] : no anterior cervical lymphadenopathy [No CVA Tenderness] : no CVA  tenderness [No Focal Deficits] : no focal deficits [Alert and Oriented x3] : oriented to person, place, and time [de-identified] : walks with cane

## 2021-01-11 LAB
25(OH)D3 SERPL-MCNC: 15.1 NG/ML
ALBUMIN SERPL ELPH-MCNC: 4.3 G/DL
ALP BLD-CCNC: 73 U/L
ALT SERPL-CCNC: 12 U/L
ANION GAP SERPL CALC-SCNC: 13 MMOL/L
AST SERPL-CCNC: 21 U/L
BASOPHILS # BLD AUTO: 0.06 K/UL
BASOPHILS NFR BLD AUTO: 1.7 %
BILIRUB SERPL-MCNC: 0.7 MG/DL
BUN SERPL-MCNC: 13 MG/DL
CALCIUM SERPL-MCNC: 9 MG/DL
CHLORIDE SERPL-SCNC: 105 MMOL/L
CHOLEST SERPL-MCNC: 178 MG/DL
CO2 SERPL-SCNC: 23 MMOL/L
CREAT SERPL-MCNC: 0.79 MG/DL
EOSINOPHIL # BLD AUTO: 0 K/UL
EOSINOPHIL NFR BLD AUTO: 0 %
ESTIMATED AVERAGE GLUCOSE: 94 MG/DL
GLUCOSE SERPL-MCNC: 89 MG/DL
HBA1C MFR BLD HPLC: 4.9 %
HCT VFR BLD CALC: 41.6 %
HDLC SERPL-MCNC: 43 MG/DL
HGB BLD-MCNC: 12.8 G/DL
LDLC SERPL CALC-MCNC: 115 MG/DL
LYMPHOCYTES # BLD AUTO: 0.43 K/UL
LYMPHOCYTES NFR BLD AUTO: 11.3 %
MAN DIFF?: NORMAL
MCHC RBC-ENTMCNC: 27.3 PG
MCHC RBC-ENTMCNC: 30.8 GM/DL
MCV RBC AUTO: 88.7 FL
MONOCYTES # BLD AUTO: 0.36 K/UL
MONOCYTES NFR BLD AUTO: 9.6 %
NEUTROPHILS # BLD AUTO: 2.82 K/UL
NEUTROPHILS NFR BLD AUTO: 73.9 %
NONHDLC SERPL-MCNC: 135 MG/DL
PLATELET # BLD AUTO: 194 K/UL
POTASSIUM SERPL-SCNC: 4.3 MMOL/L
PROT SERPL-MCNC: 7.1 G/DL
RBC # BLD: 4.69 M/UL
RBC # FLD: 14.6 %
SARS-COV-2 IGG SERPL IA-ACNC: 133 INDEX
SARS-COV-2 IGG SERPL QL IA: POSITIVE
SODIUM SERPL-SCNC: 141 MMOL/L
T4 FREE SERPL-MCNC: 1.3 NG/DL
TRIGL SERPL-MCNC: 101 MG/DL
TSH SERPL-ACNC: 3.63 UIU/ML
VIT B12 SERPL-MCNC: 1042 PG/ML
WBC # FLD AUTO: 3.77 K/UL

## 2021-01-11 RX ORDER — DULOXETINE HYDROCHLORIDE 20 MG/1
20 CAPSULE, DELAYED RELEASE PELLETS ORAL
Qty: 30 | Refills: 1 | Status: DISCONTINUED | COMMUNITY
Start: 2021-01-05 | End: 2021-01-11

## 2021-02-03 ENCOUNTER — INPATIENT (INPATIENT)
Facility: HOSPITAL | Age: 86
LOS: 3 days | Discharge: HOME | DRG: 689 | End: 2021-02-07
Attending: STUDENT IN AN ORGANIZED HEALTH CARE EDUCATION/TRAINING PROGRAM | Admitting: STUDENT IN AN ORGANIZED HEALTH CARE EDUCATION/TRAINING PROGRAM
Payer: MEDICARE

## 2021-02-03 ENCOUNTER — NON-APPOINTMENT (OUTPATIENT)
Age: 86
End: 2021-02-03

## 2021-02-03 VITALS
TEMPERATURE: 98 F | OXYGEN SATURATION: 98 % | HEART RATE: 83 BPM | DIASTOLIC BLOOD PRESSURE: 92 MMHG | HEIGHT: 69 IN | SYSTOLIC BLOOD PRESSURE: 127 MMHG | RESPIRATION RATE: 17 BRPM | WEIGHT: 153 LBS

## 2021-02-03 DIAGNOSIS — Z98.42 CATARACT EXTRACTION STATUS, LEFT EYE: Chronic | ICD-10-CM

## 2021-02-03 DIAGNOSIS — Z90.49 ACQUIRED ABSENCE OF OTHER SPECIFIED PARTS OF DIGESTIVE TRACT: Chronic | ICD-10-CM

## 2021-02-03 DIAGNOSIS — R41.82 ALTERED MENTAL STATUS, UNSPECIFIED: ICD-10-CM

## 2021-02-03 LAB
ALBUMIN SERPL ELPH-MCNC: 4.5 G/DL — SIGNIFICANT CHANGE UP (ref 3.3–5)
ALP SERPL-CCNC: 57 U/L — SIGNIFICANT CHANGE UP (ref 40–120)
ALT FLD-CCNC: 10 U/L — SIGNIFICANT CHANGE UP (ref 10–45)
ANION GAP SERPL CALC-SCNC: 17 MMOL/L — SIGNIFICANT CHANGE UP (ref 5–17)
APPEARANCE UR: ABNORMAL
AST SERPL-CCNC: 26 U/L — SIGNIFICANT CHANGE UP (ref 10–40)
BACTERIA # UR AUTO: ABNORMAL
BASE EXCESS BLDV CALC-SCNC: 0.8 MMOL/L — SIGNIFICANT CHANGE UP (ref -2–2)
BASOPHILS # BLD AUTO: 0.02 K/UL — SIGNIFICANT CHANGE UP (ref 0–0.2)
BASOPHILS NFR BLD AUTO: 0.9 % — SIGNIFICANT CHANGE UP (ref 0–2)
BILIRUB SERPL-MCNC: 0.9 MG/DL — SIGNIFICANT CHANGE UP (ref 0.2–1.2)
BILIRUB UR-MCNC: NEGATIVE — SIGNIFICANT CHANGE UP
BUN SERPL-MCNC: 17 MG/DL — SIGNIFICANT CHANGE UP (ref 7–23)
CA-I SERPL-SCNC: 1.15 MMOL/L — SIGNIFICANT CHANGE UP (ref 1.12–1.3)
CALCIUM SERPL-MCNC: 9.7 MG/DL — SIGNIFICANT CHANGE UP (ref 8.4–10.5)
CHLORIDE BLDV-SCNC: 110 MMOL/L — HIGH (ref 96–108)
CHLORIDE SERPL-SCNC: 100 MMOL/L — SIGNIFICANT CHANGE UP (ref 96–108)
CO2 BLDV-SCNC: 27 MMOL/L — SIGNIFICANT CHANGE UP (ref 22–30)
CO2 SERPL-SCNC: 22 MMOL/L — SIGNIFICANT CHANGE UP (ref 22–31)
COLOR SPEC: SIGNIFICANT CHANGE UP
CREAT SERPL-MCNC: 0.63 MG/DL — SIGNIFICANT CHANGE UP (ref 0.5–1.3)
DIFF PNL FLD: ABNORMAL
EOSINOPHIL # BLD AUTO: 0.09 K/UL — SIGNIFICANT CHANGE UP (ref 0–0.5)
EOSINOPHIL NFR BLD AUTO: 3.4 % — SIGNIFICANT CHANGE UP (ref 0–6)
EPI CELLS # UR: 2 /HPF — SIGNIFICANT CHANGE UP
GAS PNL BLDV: 138 MMOL/L — SIGNIFICANT CHANGE UP (ref 135–145)
GAS PNL BLDV: SIGNIFICANT CHANGE UP
GAS PNL BLDV: SIGNIFICANT CHANGE UP
GLUCOSE BLDV-MCNC: 113 MG/DL — HIGH (ref 70–99)
GLUCOSE SERPL-MCNC: 103 MG/DL — HIGH (ref 70–99)
GLUCOSE UR QL: NEGATIVE — SIGNIFICANT CHANGE UP
HCO3 BLDV-SCNC: 26 MMOL/L — SIGNIFICANT CHANGE UP (ref 21–29)
HCT VFR BLD CALC: 41.6 % — SIGNIFICANT CHANGE UP (ref 34.5–45)
HCT VFR BLDA CALC: 41 % — SIGNIFICANT CHANGE UP (ref 39–50)
HGB BLD CALC-MCNC: 13.5 G/DL — SIGNIFICANT CHANGE UP (ref 11.5–15.5)
HGB BLD-MCNC: 13.6 G/DL — SIGNIFICANT CHANGE UP (ref 11.5–15.5)
HYALINE CASTS # UR AUTO: 0 /LPF — SIGNIFICANT CHANGE UP (ref 0–2)
KETONES UR-MCNC: ABNORMAL
LACTATE BLDV-MCNC: 1.6 MMOL/L — SIGNIFICANT CHANGE UP (ref 0.7–2)
LACTATE BLDV-MCNC: 2.6 MMOL/L — HIGH (ref 0.7–2)
LEUKOCYTE ESTERASE UR-ACNC: ABNORMAL
LYMPHOCYTES # BLD AUTO: 0.47 K/UL — LOW (ref 1–3.3)
LYMPHOCYTES # BLD AUTO: 18.6 % — SIGNIFICANT CHANGE UP (ref 13–44)
MANUAL SMEAR VERIFICATION: SIGNIFICANT CHANGE UP
MCHC RBC-ENTMCNC: 27.5 PG — SIGNIFICANT CHANGE UP (ref 27–34)
MCHC RBC-ENTMCNC: 32.7 GM/DL — SIGNIFICANT CHANGE UP (ref 32–36)
MCV RBC AUTO: 84.2 FL — SIGNIFICANT CHANGE UP (ref 80–100)
MONOCYTES # BLD AUTO: 0.75 K/UL — SIGNIFICANT CHANGE UP (ref 0–0.9)
MONOCYTES NFR BLD AUTO: 29.7 % — HIGH (ref 2–14)
NEUTROPHILS # BLD AUTO: 1.18 K/UL — LOW (ref 1.8–7.4)
NEUTROPHILS NFR BLD AUTO: 46.6 % — SIGNIFICANT CHANGE UP (ref 43–77)
NITRITE UR-MCNC: NEGATIVE — SIGNIFICANT CHANGE UP
PCO2 BLDV: 45 MMHG — SIGNIFICANT CHANGE UP (ref 35–50)
PH BLDV: 7.38 — SIGNIFICANT CHANGE UP (ref 7.35–7.45)
PH UR: 7.5 — SIGNIFICANT CHANGE UP (ref 5–8)
PLAT MORPH BLD: NORMAL — SIGNIFICANT CHANGE UP
PLATELET # BLD AUTO: 189 K/UL — SIGNIFICANT CHANGE UP (ref 150–400)
PO2 BLDV: 31 MMHG — SIGNIFICANT CHANGE UP (ref 25–45)
POTASSIUM BLDV-SCNC: 3.2 MMOL/L — LOW (ref 3.5–5.3)
POTASSIUM SERPL-MCNC: 3.7 MMOL/L — SIGNIFICANT CHANGE UP (ref 3.5–5.3)
POTASSIUM SERPL-SCNC: 3.7 MMOL/L — SIGNIFICANT CHANGE UP (ref 3.5–5.3)
PROT SERPL-MCNC: 8.6 G/DL — HIGH (ref 6–8.3)
PROT UR-MCNC: 100 — SIGNIFICANT CHANGE UP
RBC # BLD: 4.94 M/UL — SIGNIFICANT CHANGE UP (ref 3.8–5.2)
RBC # FLD: 14.9 % — HIGH (ref 10.3–14.5)
RBC BLD AUTO: SIGNIFICANT CHANGE UP
RBC CASTS # UR COMP ASSIST: 4 /HPF — SIGNIFICANT CHANGE UP (ref 0–4)
SAO2 % BLDV: 53 % — LOW (ref 67–88)
SARS-COV-2 RNA SPEC QL NAA+PROBE: SIGNIFICANT CHANGE UP
SODIUM SERPL-SCNC: 139 MMOL/L — SIGNIFICANT CHANGE UP (ref 135–145)
SP GR SPEC: 1.01 — SIGNIFICANT CHANGE UP (ref 1.01–1.02)
UROBILINOGEN FLD QL: NEGATIVE — SIGNIFICANT CHANGE UP
VARIANT LYMPHS # BLD: 0.8 % — SIGNIFICANT CHANGE UP (ref 0–6)
WBC # BLD: 2.54 K/UL — LOW (ref 3.8–10.5)
WBC # FLD AUTO: 2.54 K/UL — LOW (ref 3.8–10.5)
WBC UR QL: 2 /HPF — SIGNIFICANT CHANGE UP (ref 0–5)

## 2021-02-03 PROCEDURE — 70450 CT HEAD/BRAIN W/O DYE: CPT | Mod: 26,MA

## 2021-02-03 PROCEDURE — 99285 EMERGENCY DEPT VISIT HI MDM: CPT

## 2021-02-03 PROCEDURE — 93010 ELECTROCARDIOGRAM REPORT: CPT

## 2021-02-03 PROCEDURE — 71045 X-RAY EXAM CHEST 1 VIEW: CPT | Mod: 26

## 2021-02-03 RX ORDER — AMLODIPINE BESYLATE 2.5 MG/1
5 TABLET ORAL ONCE
Refills: 0 | Status: COMPLETED | OUTPATIENT
Start: 2021-02-03 | End: 2021-02-03

## 2021-02-03 RX ORDER — CEFTRIAXONE 500 MG/1
1000 INJECTION, POWDER, FOR SOLUTION INTRAMUSCULAR; INTRAVENOUS ONCE
Refills: 0 | Status: COMPLETED | OUTPATIENT
Start: 2021-02-03 | End: 2021-02-03

## 2021-02-03 RX ORDER — SODIUM CHLORIDE 9 MG/ML
1000 INJECTION, SOLUTION INTRAVENOUS ONCE
Refills: 0 | Status: COMPLETED | OUTPATIENT
Start: 2021-02-03 | End: 2021-02-03

## 2021-02-03 RX ADMIN — CEFTRIAXONE 100 MILLIGRAM(S): 500 INJECTION, POWDER, FOR SOLUTION INTRAMUSCULAR; INTRAVENOUS at 21:52

## 2021-02-03 RX ADMIN — AMLODIPINE BESYLATE 5 MILLIGRAM(S): 2.5 TABLET ORAL at 19:04

## 2021-02-03 RX ADMIN — SODIUM CHLORIDE 1000 MILLILITER(S): 9 INJECTION, SOLUTION INTRAVENOUS at 18:30

## 2021-02-03 NOTE — ED PROVIDER NOTE - CLINICAL SUMMARY MEDICAL DECISION MAKING FREE TEXT BOX
Attn - pt with increased confusion and hx of dementia - hx from EMS per daughter.  pt only c/o depression and R h/a.  Labs, UA, CT head, cxr, additional hx from family, reassess.

## 2021-02-03 NOTE — ED PROVIDER NOTE - PHYSICAL EXAMINATION
Attn - alert, NAD, no pallor or jaundice, PERRL 3 mm with bilat IOL, very dry, skin - warm and dry, Lungs - clear, no w/r/r, good BS bilaterally, Cor - rr, no M, no rub, Abdo - ND, soft, NT, no HSM, no CVAT, no guarding or rebound. Extremities - no edema, no calf tenderness, distal pulses intact and symmetrical, Neuro - intact and non-focal

## 2021-02-03 NOTE — ED ADULT NURSE REASSESSMENT NOTE - NS ED NURSE REASSESS COMMENT FT1
pt became increasingly agitated and trying to get out of bed, pt began waxing and waning out of alertness. constant observation and 1:1 at bedside for patient safety.

## 2021-02-03 NOTE — ED ADULT NURSE NOTE - OBJECTIVE STATEMENT
86y Female with PMH of macular degeneration and depression BIB EMS for AMS. Per EMS pt's daughter was concerned due to pt being confused for ~x2days. Daughter reported pt at baseline is AOx3, and pt has been reporting increased urinary frequency. At this time, pt AOx1 (oriented to person), spontaneous/unlabored respirations, speaking in full sentences, clear lung sounds b/l, pt denies having any pain. Denies having any pain or burning with urination. Pt resting in bed, side rails up, bed in lowest position. Seen and evaluated by MD.

## 2021-02-03 NOTE — ED PROVIDER NOTE - OBJECTIVE STATEMENT
Attn - pt see in OR67 - BIB EMS from home with increased confusion per daughter - pt not able to provide reliable hx.  pt c/o depression and R headache.  Rest of ROS neg.  Will contact daughter for further info. 86y f PMHx HTN, macular degeneration, depression, diverticulitis BIBA for AMS. EMS reports pts daughter called them concern for AMS x2 days. daughter reported pt has not been herself, is becoming confused at home. in ED pt is AOX3, reports increased urinary frequency/urgency as well as right frontal HA x1 week. reports depression worse than usual, but denies SI/HI in ED reports adequate PO intake. Denies CP, SOB, fever, chills, abdominal pain, NVDC, rash, numbness, weakness, dizziness, vision changes, or LOC  Attn - pt see in OR67 - BIB EMS from home with increased confusion per daughter - pt not able to provide reliable hx.  pt c/o depression and R headache.  Rest of ROS neg.  Will contact daughter for further info. 86y f PMHx HTN, macular degeneration, depression, diverticulitis BIBA for AMS. EMS reports pts daughter called them concern for AMS x2 days. daughter reported pt has not been herself, is becoming confused at home. in ED pt is AOX3, reports increased urinary frequency/urgency as well as right frontal HA x1 week. reports depression worse than usual, but denies SI/HI in ED reports adequate PO intake. Denies CP, SOB, fever, chills, abdominal pain, NVDC, rash, numbness, weakness, dizziness, vision changes, or LOC. collateral from daughter Smiley - she called EMS concerned for pt repeating questions, reporting urinary frequency and generalized weakness.  Attn - pt seen in OR67 - BIB EMS from home with increased confusion per daughter - pt not able to provide reliable hx.  pt c/o depression and R headache.  Rest of ROS neg.  Will contact daughter for further info.

## 2021-02-03 NOTE — ED ADULT NURSE NOTE - NSIMPLEMENTINTERV_GEN_ALL_ED
Implemented All Fall with Harm Risk Interventions:  [place] to call system. Call bell, personal items and telephone within reach. Instruct patient to call for assistance. Room bathroom lighting operational. Non-slip footwear when patient is off stretcher. Physically safe environment: no spills, clutter or unnecessary equipment. Stretcher in lowest position, wheels locked, appropriate side rails in place. Provide visual cue, wrist band, yellow gown, etc. Monitor gait and stability. Monitor for mental status changes and reorient to person, place, and time. Review medications for side effects contributing to fall risk. Reinforce activity limits and safety measures with patient and family. Provide visual clues: red socks.

## 2021-02-04 ENCOUNTER — TRANSCRIPTION ENCOUNTER (OUTPATIENT)
Age: 86
End: 2021-02-04

## 2021-02-04 DIAGNOSIS — G93.40 ENCEPHALOPATHY, UNSPECIFIED: ICD-10-CM

## 2021-02-04 DIAGNOSIS — F32.9 MAJOR DEPRESSIVE DISORDER, SINGLE EPISODE, UNSPECIFIED: ICD-10-CM

## 2021-02-04 DIAGNOSIS — N30.00 ACUTE CYSTITIS WITHOUT HEMATURIA: ICD-10-CM

## 2021-02-04 DIAGNOSIS — R41.0 DISORIENTATION, UNSPECIFIED: ICD-10-CM

## 2021-02-04 DIAGNOSIS — F41.9 ANXIETY DISORDER, UNSPECIFIED: ICD-10-CM

## 2021-02-04 DIAGNOSIS — I10 ESSENTIAL (PRIMARY) HYPERTENSION: ICD-10-CM

## 2021-02-04 LAB
ALBUMIN SERPL ELPH-MCNC: 4.1 G/DL — SIGNIFICANT CHANGE UP (ref 3.3–5)
ALP SERPL-CCNC: 52 U/L — SIGNIFICANT CHANGE UP (ref 40–120)
ALT FLD-CCNC: 11 U/L — SIGNIFICANT CHANGE UP (ref 10–45)
ANION GAP SERPL CALC-SCNC: 15 MMOL/L — SIGNIFICANT CHANGE UP (ref 5–17)
AST SERPL-CCNC: 25 U/L — SIGNIFICANT CHANGE UP (ref 10–40)
BASOPHILS # BLD AUTO: 0.07 K/UL — SIGNIFICANT CHANGE UP (ref 0–0.2)
BASOPHILS NFR BLD AUTO: 2.6 % — HIGH (ref 0–2)
BILIRUB SERPL-MCNC: 1 MG/DL — SIGNIFICANT CHANGE UP (ref 0.2–1.2)
BUN SERPL-MCNC: 14 MG/DL — SIGNIFICANT CHANGE UP (ref 7–23)
CALCIUM SERPL-MCNC: 9 MG/DL — SIGNIFICANT CHANGE UP (ref 8.4–10.5)
CHLORIDE SERPL-SCNC: 102 MMOL/L — SIGNIFICANT CHANGE UP (ref 96–108)
CO2 SERPL-SCNC: 20 MMOL/L — LOW (ref 22–31)
CREAT SERPL-MCNC: 0.54 MG/DL — SIGNIFICANT CHANGE UP (ref 0.5–1.3)
EOSINOPHIL # BLD AUTO: 0.09 K/UL — SIGNIFICANT CHANGE UP (ref 0–0.5)
EOSINOPHIL NFR BLD AUTO: 3.5 % — SIGNIFICANT CHANGE UP (ref 0–6)
FOLATE SERPL-MCNC: 15.1 NG/ML — SIGNIFICANT CHANGE UP
GLUCOSE SERPL-MCNC: 97 MG/DL — SIGNIFICANT CHANGE UP (ref 70–99)
HCT VFR BLD CALC: 37.6 % — SIGNIFICANT CHANGE UP (ref 34.5–45)
HGB BLD-MCNC: 12.4 G/DL — SIGNIFICANT CHANGE UP (ref 11.5–15.5)
LYMPHOCYTES # BLD AUTO: 0.55 K/UL — LOW (ref 1–3.3)
LYMPHOCYTES # BLD AUTO: 20.9 % — SIGNIFICANT CHANGE UP (ref 13–44)
MAGNESIUM SERPL-MCNC: 1.7 MG/DL — SIGNIFICANT CHANGE UP (ref 1.6–2.6)
MANUAL SMEAR VERIFICATION: SIGNIFICANT CHANGE UP
MCHC RBC-ENTMCNC: 27.3 PG — SIGNIFICANT CHANGE UP (ref 27–34)
MCHC RBC-ENTMCNC: 33 GM/DL — SIGNIFICANT CHANGE UP (ref 32–36)
MCV RBC AUTO: 82.8 FL — SIGNIFICANT CHANGE UP (ref 80–100)
MONOCYTES # BLD AUTO: 0.73 K/UL — SIGNIFICANT CHANGE UP (ref 0–0.9)
MONOCYTES NFR BLD AUTO: 27.8 % — HIGH (ref 2–14)
NEUTROPHILS # BLD AUTO: 1.18 K/UL — LOW (ref 1.8–7.4)
NEUTROPHILS NFR BLD AUTO: 44.3 % — SIGNIFICANT CHANGE UP (ref 43–77)
NEUTS BAND # BLD: 0.9 % — SIGNIFICANT CHANGE UP (ref 0–8)
PHOSPHATE SERPL-MCNC: 3 MG/DL — SIGNIFICANT CHANGE UP (ref 2.5–4.5)
PLAT MORPH BLD: NORMAL — SIGNIFICANT CHANGE UP
PLATELET # BLD AUTO: 166 K/UL — SIGNIFICANT CHANGE UP (ref 150–400)
POIKILOCYTOSIS BLD QL AUTO: SLIGHT — SIGNIFICANT CHANGE UP
POTASSIUM SERPL-MCNC: 3.3 MMOL/L — LOW (ref 3.5–5.3)
POTASSIUM SERPL-SCNC: 3.3 MMOL/L — LOW (ref 3.5–5.3)
PROT SERPL-MCNC: 7.6 G/DL — SIGNIFICANT CHANGE UP (ref 6–8.3)
RBC # BLD: 4.54 M/UL — SIGNIFICANT CHANGE UP (ref 3.8–5.2)
RBC # FLD: 14.7 % — HIGH (ref 10.3–14.5)
RBC BLD AUTO: ABNORMAL
SARS-COV-2 IGG SERPL QL IA: POSITIVE
SARS-COV-2 IGM SERPL IA-ACNC: 7.35 INDEX — HIGH
SCHISTOCYTES BLD QL AUTO: SLIGHT — SIGNIFICANT CHANGE UP
SODIUM SERPL-SCNC: 137 MMOL/L — SIGNIFICANT CHANGE UP (ref 135–145)
T PALLIDUM AB TITR SER: NEGATIVE — SIGNIFICANT CHANGE UP
TROPONIN T, HIGH SENSITIVITY RESULT: 52 NG/L — HIGH (ref 0–51)
TROPONIN T, HIGH SENSITIVITY RESULT: 61 NG/L — HIGH (ref 0–51)
TSH SERPL-MCNC: 3.1 UIU/ML — SIGNIFICANT CHANGE UP (ref 0.27–4.2)
VIT B12 SERPL-MCNC: 772 PG/ML — SIGNIFICANT CHANGE UP (ref 232–1245)
WBC # BLD: 2.61 K/UL — LOW (ref 3.8–10.5)
WBC # FLD AUTO: 2.61 K/UL — LOW (ref 3.8–10.5)

## 2021-02-04 PROCEDURE — 99233 SBSQ HOSP IP/OBS HIGH 50: CPT

## 2021-02-04 PROCEDURE — 93010 ELECTROCARDIOGRAM REPORT: CPT

## 2021-02-04 RX ORDER — POTASSIUM CHLORIDE 20 MEQ
40 PACKET (EA) ORAL EVERY 4 HOURS
Refills: 0 | Status: COMPLETED | OUTPATIENT
Start: 2021-02-04 | End: 2021-02-04

## 2021-02-04 RX ORDER — ENOXAPARIN SODIUM 100 MG/ML
40 INJECTION SUBCUTANEOUS DAILY
Refills: 0 | Status: DISCONTINUED | OUTPATIENT
Start: 2021-02-04 | End: 2021-02-07

## 2021-02-04 RX ORDER — LANOLIN ALCOHOL/MO/W.PET/CERES
3 CREAM (GRAM) TOPICAL
Refills: 0 | Status: DISCONTINUED | OUTPATIENT
Start: 2021-02-04 | End: 2021-02-07

## 2021-02-04 RX ORDER — AMLODIPINE BESYLATE 2.5 MG/1
2.5 TABLET ORAL DAILY
Refills: 0 | Status: DISCONTINUED | OUTPATIENT
Start: 2021-02-04 | End: 2021-02-05

## 2021-02-04 RX ORDER — HALOPERIDOL DECANOATE 100 MG/ML
1 INJECTION INTRAMUSCULAR ONCE
Refills: 0 | Status: COMPLETED | OUTPATIENT
Start: 2021-02-04 | End: 2021-02-04

## 2021-02-04 RX ORDER — FLUOXETINE HCL 10 MG
10 CAPSULE ORAL DAILY
Refills: 0 | Status: DISCONTINUED | OUTPATIENT
Start: 2021-02-04 | End: 2021-02-07

## 2021-02-04 RX ORDER — CEFTRIAXONE 500 MG/1
1000 INJECTION, POWDER, FOR SOLUTION INTRAMUSCULAR; INTRAVENOUS EVERY 24 HOURS
Refills: 0 | Status: DISCONTINUED | OUTPATIENT
Start: 2021-02-04 | End: 2021-02-05

## 2021-02-04 RX ORDER — DULOXETINE HYDROCHLORIDE 30 MG/1
0 CAPSULE, DELAYED RELEASE ORAL
Qty: 0 | Refills: 1 | DISCHARGE

## 2021-02-04 RX ADMIN — AMLODIPINE BESYLATE 2.5 MILLIGRAM(S): 2.5 TABLET ORAL at 08:08

## 2021-02-04 RX ADMIN — CEFTRIAXONE 100 MILLIGRAM(S): 500 INJECTION, POWDER, FOR SOLUTION INTRAMUSCULAR; INTRAVENOUS at 22:30

## 2021-02-04 RX ADMIN — ENOXAPARIN SODIUM 40 MILLIGRAM(S): 100 INJECTION SUBCUTANEOUS at 14:04

## 2021-02-04 RX ADMIN — HALOPERIDOL DECANOATE 1 MILLIGRAM(S): 100 INJECTION INTRAMUSCULAR at 15:45

## 2021-02-04 RX ADMIN — Medication 3 MILLIGRAM(S): at 22:30

## 2021-02-04 RX ADMIN — Medication 40 MILLIEQUIVALENT(S): at 17:46

## 2021-02-04 RX ADMIN — Medication 40 MILLIEQUIVALENT(S): at 14:04

## 2021-02-04 RX ADMIN — Medication 10 MILLIGRAM(S): at 14:04

## 2021-02-04 NOTE — H&P ADULT - HISTORY OF PRESENT ILLNESS
86y f PMHx HTN, macular degeneration, depression, diverticulitis BIBA for AMS. EMS reports pts daughter called them concern for AMS x2 days. daughter reported pt has not been herself, is becoming confused at home. in ED pt is AOX3, reports increased urinary frequency/urgency as well as right frontal HA x1 week. reports depression worse than usual, but denies SI/HI in ED reports adequate PO intake. 86y f PMHx HTN, macular degeneration, depression, diverticulitis BIBA for AMS. Pt's daughter reports concern for AMS x2 days with patient becoming more confused at home. Patient currently continues to be somewhat confused, knowing she is in the hospital and who she is but not remembering why she is here. Patient complains of increased urinary frequency/urgency. Per ER records had c/o R frontal headache as well but currently denies. Denies any fevers or chills/nausea/vomiting/flank pain. Denies dysuria. No cough/myalgias.

## 2021-02-04 NOTE — DISCHARGE NOTE NURSING/CASE MANAGEMENT/SOCIAL WORK - PATIENT PORTAL LINK FT
You can access the FollowMyHealth Patient Portal offered by [facility] by registering at the following website: [email or web address]. By joining [facility]’s FollowMyHealth portal, you will also be able to view your health information using other applications (apps) compatible with our system.

## 2021-02-04 NOTE — H&P ADULT - PROBLEM SELECTOR PLAN 3
Continue SSRI  Change benzodiazpines to PRN as this may be worsening her confusion-does not appear to be in benzodiazepine withdrawal clinically on exam

## 2021-02-04 NOTE — H&P ADULT - NSICDXPASTMEDICALHX_GEN_ALL_CORE_FT
PAST MEDICAL HISTORY:  Anxiety     Depression     Diverticulitis     Hyperlipidemia     Macular degeneration right eye    MVP (mitral valve prolapse)     OA (osteoarthritis) knee and hip

## 2021-02-04 NOTE — H&P ADULT - NSICDXPASTSURGICALHX_GEN_ALL_CORE_FT
PAST SURGICAL HISTORY:  S/P left cataract extraction right and left    Status post cholecystectomy

## 2021-02-04 NOTE — CONSULT NOTE ADULT - ATTENDING COMMENTS
I performed a history and physical examination of the patient and discussed the management of the patient with the resident. I reviewed the resident's note and agree with the documented findings and plan of care with the following additions/exceptions.    dos 2/5/21    pt said she felt normal.   on exam she was alert, no wfd. she incorrectly said it was march but knew the yr was 21 and pres was biden.  she was disoriented to place, thinking we were near [facility], but able to choose hospital fromlist  she could follow 3 step command  in coutning backwards from 20 she paused at 14 and repeated it while thinking, then skippped 13, able to continue down to 1.  she confabulated with string test- said it was red  she thought I looked familiar, couldn't say where we had met (havent met before)  eomi, face symmetric  limbs symmetric, no drift, no asterixis    dx delirium in setting of UTI   hct reviewed, just shows genearlized atrophy  favor mri w/o contrast since had language sx initially, could have been worse encephalopathy then but would want to r/o stroke  rec routine eeg  though low suspicion for seizure

## 2021-02-04 NOTE — CONSULT NOTE ADULT - SUBJECTIVE AND OBJECTIVE BOX
INCOMPLETE MRN-[id number]  Patient is a 86y old  Female who presents with a chief complaint of Altered mental status (2021 13:49)    HPI:  Patient is a 85yo F with pmh of htn, macular degeneration, depression, s/p kyphoplasty in  presents to [facility] ED for AMS for the past 3 days. Patient was seen at bedside this afternoon in the ED. History was obtained by daughter via phone. Per daughter, patient started to have a frontal headaches on  and was not subsiding with tylenol. Patients daughter noticed patient was not having coherent thoughts and conversations with patient were not making sense. Patient started having increasing episodes of urinary incontinence Patient  Denies any fevers or chills/nausea/vomiting/flank pain. Denies dysuria. No cough/myalgias.      PAST MEDICAL & SURGICAL HISTORY:  Hyperlipidemia    OA (osteoarthritis)  knee and hip    MVP (mitral valve prolapse)    Macular degeneration  right eye    Diverticulitis    Depression    Anxiety    S/P left cataract extraction  right and left    Status post cholecystectomy      FAMILY HISTORY:  No pertinent family history in first degree relatives      Social Hx:  Nonsmoker, no drug or alcohol use    Home Medications:  amLODIPine 2.5 mg oral tablet: 1 tab(s) orally once a day (2021 07:50)  aspirin 81 mg oral delayed release tablet: 1 tab(s) orally once a day (2021 06:27)  docusate sodium 100 mg oral capsule: 1 cap(s) orally 3 times a day, As Needed (2021 06:27)  Eylea 40 mg/mL intraocular solution:  intraocular  injected every 6 weeks (2021 06:27)  FLUOXETINE HCL 10 MG CAPSULE: TAKE 1 CAPSULE BY MOUTH EVERY DAY (2021 07:50)  LORAZEPAM 0.5 MG TABLET: TAKE 1 TABLET BY MOUTH AT BEDTIME (2021 07:50)  VITAMIN D2 1.25MG(50,000 UNIT): TAKE 1 CAPSULE BY MOUTH ONE TIME PER WEEK (2021 07:50)    MEDICATIONS  (STANDING):  amLODIPine   Tablet 2.5 milliGRAM(s) Oral daily  cefTRIAXone   IVPB 1000 milliGRAM(s) IV Intermittent every 24 hours  enoxaparin Injectable 40 milliGRAM(s) SubCutaneous daily  FLUoxetine 10 milliGRAM(s) Oral daily  potassium chloride    Tablet ER 40 milliEquivalent(s) Oral every 4 hours    MEDICATIONS  (PRN):  LORazepam     Tablet 0.5 milliGRAM(s) Oral at bedtime PRN insomnia/anxiety    Allergies  No Known Allergies    Intolerances      REVIEW OF SYSTEMS  General: no fever. 	  Skin/Breast: no rashes, no abrasions noted. 	  Ophthalmologic: mentions macular degeneration.   ENT: denies sore throat.   Respiratory and Thorax:	no sob.   Cardiovascular:	no chest pain.   Gastrointestinal:	denies abdominal pain, nausea and vomiting   Genitourinary: mentions urinary frequency   Musculoskeletal:	 denies muscle pain   Neurological: denies headaches.     ROS: Pertinent positives in HPI, all other ROS were reviewed and are negative.      Vital Signs Last 24 Hrs  T(C): 36.7 (2021 15:52), Max: 37.1 (2021 05:14)  T(F): 98 (2021 15:52), Max: 98.7 (2021 05:14)  HR: 105 (2021 15:52) (69 - 105)  BP: 139/76 (2021 15:52) (127/92 - 204/96)  BP(mean): --  RR: 20 (2021 15:52) (17 - 20)  SpO2: 92% (2021 15:52) (92% - 99%)    GENERAL EXAM:  Constitutional: awake and alert. NAD  HEENT: 3mm b/l. PERRL, EOMI  Neck: Supple  Respiratory: Breath sounds are clear bilaterally  Cardiovascular: S1 and S2, regular / irregular rhythm  Gastrointestinal: soft, nontender  Extremities: no edema, no cyanosis  Vascular: no carotid bruits  Musculoskeletal: no joint swelling/tenderness, no abnormal movements  Skin: no rashes    Neurological Exam   MS: AAOX3. Speech: fluent, no dysarthria noted.   CN: VFF, EOMI, PERRL, no KARLA, no APD,    V1-3 intact, no facial asymmetry, t/p midline, SCM/trap intact.  Hearing intact to finger rubs b/l.   Eyes-Fundi: no papilledema.  Motor: Strength: 5/5 4x.   Tone: normal. Bulk: normal.  DTR 2+ symm. brachioradialis, patellar b/l.  Plantar flex b/l.   Sensation: intact to LT noted more in UE b/l when compared to LE b/l.   Vibration: equviocal.   Coordination: intact noted in the UE. Finger to Nose intact b/l.   Babinksi noted to be wnl.     Labs:   cbc                      12.4   2.61  )-----------( 166      ( 2021 11:21 )             37.6     [id number]    137  |  102  |  14  ----------------------------<  97  3.3<L>   |  20<L>  |  0.54    Ca    9.0      2021 11:21  Phos  3.0     02-04  Mg     1.7     02-04    TPro  7.6  /  Alb  4.1  /  TBili  1.0  /  DBili  x   /  AST  25  /  ALT  11  /  AlkPhos  52  02-04    LFTsLIVER FUNCTIONS - ( 2021 11:21 )  Alb: 4.1 g/dL / Pro: 7.6 g/dL / ALK PHOS: 52 U/L / ALT: 11 U/L / AST: 25 U/L / GGT: x           UAUrinalysis Basic - ( 2021 20:46 )    Color: Light Yellow / Appearance: Slightly Turbid / S.015 / pH: x  Gluc: x / Ketone: Small  / Bili: Negative / Urobili: Negative   Blood: x / Protein: 100 / Nitrite: Negative   Leuk Esterase: Small / RBC: 4 /hpf / WBC 2 /HPF   Sq Epi: x / Non Sq Epi: 2 /hpf / Bacteria: Many      CSF  Immunological Labs    Radiology:  2021  -CT Head w/o contrast: No evidence of acute intracranial hemorrhage, brain edema, nor mass.      [id number]  Patient is a 86y old  Female who presents with a chief complaint of Altered mental status (2021 13:49)    HPI:  Patient is a 87yo F with pmh of htn, macular degeneration, depression, s/p kyphoplasty in  presents to [facility] ED for AMS for the past 3 days. Patient was seen at bedside this afternoon in the ED. History was obtained by patient and by daughter via phone. Per daughter, patient started to have a b/l frontal headaches on  and that was not subsiding with tylenol. Patient's daughter noticed patient was not having coherent thoughts and conversations with her. Patient started having increasing episodes of urinary incontinence from her baseline. Patient denies headaches, fever, chills, nausea, vomiting, numbness, and tingling.      PAST MEDICAL & SURGICAL HISTORY:  Hyperlipidemia  OA (osteoarthritis)- knee and hip  MVP (mitral valve prolapse)  Macular degeneration- right eye  Diverticulitis  Depression  Anxiety  S/P left cataract extraction  Status post cholecystectomy    FAMILY HISTORY:  No pertinent family history in first degree relatives  Social Hx:  Nonsmoker, no drug or alcohol use    Home Medications:  amLODIPine 2.5 mg oral tablet: 1 tab(s) orally once a day (2021 07:50)  aspirin 81 mg oral delayed release tablet: 1 tab(s) orally once a day (2021 06:27)  docusate sodium 100 mg oral capsule: 1 cap(s) orally 3 times a day, As Needed (2021 06:27)  Eylea 40 mg/mL intraocular solution:  intraocular  injected every 6 weeks (2021 06:27)  FLUOXETINE HCL 10 MG CAPSULE: TAKE 1 CAPSULE BY MOUTH EVERY DAY (2021 07:50)  LORAZEPAM 0.5 MG TABLET: TAKE 1 TABLET BY MOUTH AT BEDTIME (2021 07:50)  VITAMIN D2 1.25MG(50,000 UNIT): TAKE 1 CAPSULE BY MOUTH ONE TIME PER WEEK (2021 07:50)    MEDICATIONS  (STANDING):  amLODIPine   Tablet 2.5 milliGRAM(s) Oral daily  cefTRIAXone   IVPB 1000 milliGRAM(s) IV Intermittent every 24 hours  enoxaparin Injectable 40 milliGRAM(s) SubCutaneous daily  FLUoxetine 10 milliGRAM(s) Oral daily  potassium chloride    Tablet ER 40 milliEquivalent(s) Oral every 4 hours    MEDICATIONS  (PRN):  LORazepam     Tablet 0.5 milliGRAM(s) Oral at bedtime PRN insomnia/anxiety    Allergies  No Known Allergies    REVIEW OF SYSTEMS  General: no fever. 	  Skin/Breast: no rashes, no abrasions noted. 	  Ophthalmologic: mentions macular degeneration.   ENT: denies sore throat.   Respiratory and Thorax:	no sob.   Cardiovascular:	no chest pain.   Gastrointestinal:	denies abdominal pain, nausea and vomiting   Genitourinary: mentions urinary frequency   Musculoskeletal:	 denies muscle pain   Neurological: denies headaches.     ROS: Pertinent positives in HPI, all other ROS were reviewed and are negative.      Vital Signs Last 24 Hrs  T(C): 36.7 (2021 15:52), Max: 37.1 (2021 05:14)  T(F): 98 (2021 15:52), Max: 98.7 (2021 05:14)  HR: 105 (2021 15:52) (69 - 105)  BP: 139/76 (2021 15:52) (127/92 - 204/96)  BP(mean): --  RR: 20 (2021 15:52) (17 - 20)  SpO2: 92% (2021 15:52) (92% - 99%)    GENERAL EXAM:  Constitutional: awake and alert. NAD  HEENT: 3mm b/l. PERRL, EOMI  Neck: Supple  Respiratory: Breath sounds are clear bilaterally  Cardiovascular: S1 and S2, regular / irregular rhythm  Gastrointestinal: soft, nontender  Extremities: no edema, no cyanosis  Vascular: no carotid bruits  Musculoskeletal: no joint swelling/tenderness, no abnormal movements  Skin: no rashes    Neurological Exam   MS: AAOX3. Speech: fluent, no dysarthria noted.   CN: VF intact. EOMI. PERRL. no KARLA, no APD,    V1-3 intact, no facial asymmetry, t/p midline, SCM/trap intact.  Hearing intact to finger rubs b/l.   Eyes-Fundi: no papilledema.  Motor: Strength: 5/5 in UE and LE b/l.    Tone: normal. Bulk: normal.  DTR 2+ symm. brachioradialis, patellar b/l.  Plantar flex b/l.   Sensation: intact to LT noted more in UE b/l when compared to LE b/l.   Vibration: equivocal   Coordination: Finger to Nose intact b/l.   Babinksi noted to be wnl.   Brudzinski sign:  negative. Kernig sign:  negative.     Labs:   cbc                      12.4   2.61  )-----------( 166      ( 2021 11:21 )             37.6     [id number]    137  |  102  |  14  ----------------------------<  97  3.3<L>   |  20<L>  |  0.54    Ca    9.0      2021 11:21  Phos  3.0     02-04  Mg     1.7     02-04    TPro  7.6  /  Alb  4.1  /  TBili  1.0  /  DBili  x   /  AST  25  /  ALT  11  /  AlkPhos  52  02-04    LFTs: LIVER FUNCTIONS - ( 2021 11:21 )  Alb: 4.1 g/dL / Pro: 7.6 g/dL / ALK PHOS: 52 U/L / ALT: 11 U/L / AST: 25 U/L / GGT: x           UAUrinalysis Basic - ( 2021 20:46 )    Color: Light Yellow / Appearance: Slightly Turbid / S.015 / pH: x  Gluc: x / Ketone: Small  / Bili: Negative / Urobili: Negative   Blood: x / Protein: 100 / Nitrite: Negative   Leuk Esterase: Small / RBC: 4 /hpf / WBC 2 /HPF   Sq Epi: x / Non Sq Epi: 2 /hpf / Bacteria: Many    Radiology:  2021  -CT Head w/o contrast: No evidence of acute intracranial hemorrhage, brain edema, nor mass.

## 2021-02-04 NOTE — H&P ADULT - ASSESSMENT
86y f PMHx HTN, macular degeneration, depression, diverticulitis BIBA for AMS. Pt's daughter reports concern for AMS x2 days with patient becoming more confused at home found to have acute cystitis.

## 2021-02-04 NOTE — H&P ADULT - NSHPLABSRESULTS_GEN_ALL_CORE
Labs personally reviewed:                        13.6   2.54  )-----------( 189      ( 2021 19:34 )             41.6     02-03    139  |  100  |  17  ----------------------------<  103<H>  3.7   |  22  |  0.63    Ca    9.7      2021 19:34    TPro  8.6<H>  /  Alb  4.5  /  TBili  0.9  /  DBili  x   /  AST  26  /  ALT  10  /  AlkPhos  57  02-03        LIVER FUNCTIONS - ( 2021 19:34 )  Alb: 4.5 g/dL / Pro: 8.6 g/dL / ALK PHOS: 57 U/L / ALT: 10 U/L / AST: 26 U/L / GGT: x             Urinalysis Basic - ( 2021 20:46 )    Color: Light Yellow / Appearance: Slightly Turbid / S.015 / pH: x  Gluc: x / Ketone: Small  / Bili: Negative / Urobili: Negative   Blood: x / Protein: 100 / Nitrite: Negative   Leuk Esterase: Small / RBC: 4 /hpf / WBC 2 /HPF   Sq Epi: x / Non Sq Epi: 2 /hpf / Bacteria: Many      Imaging personally reviewed-no acute findings    EKG personally reviewed-sinus tachycardia with pvcs

## 2021-02-04 NOTE — CONSULT NOTE ADULT - ASSESSMENT
INCOMPLETE Assessment:   Patient is a 87 yo F with pmh of htn, macular degeneration, depression, s/p kyphoplasty in 2015 presents to [facility] ED for AMS for the past 3 days. Patient was seen at bedside this afternoon in the ED. History was obtained by daughter via phone. Per daughter, patient started to have a frontal headaches on 2/1 and was not subsiding with tylenol. Patients daughter noticed patient was not having coherent thoughts and conversations with patient were not making sense.         Impression/Plan:   Acute onset of AMS 2/2 to possible metabolic vs infectious etiology   Recommendation:   1. EEG   2. Brain MRI w/o contrast   3. Vitamin B12, Folate, Ammonia, TSH copper  4. Continue current medication      Assessment:   Patient is a 85 yo F with pmh of htn, macular degeneration, depression, s/p kyphoplasty in 2015 presents to [facility] ED for AMS for the past 3 days. Patient was seen at bedside this afternoon in the ED. History was obtained by patient and by daughter via phone. Per daughter, patient started to have a frontal headaches on 2/1 and was not subsiding with Tylenol. Patient's daughter noticed patient was not having coherent thoughts and conversations with her.     Impression/Plan:   Acute onset of AMS 2/2 to possible metabolic etiology vs Delirium   ·	UTI present per U/A results   ·	Ct head w/o contrast: negative for acute findings.     Recommendations:   1. Recommend ordering EEG   2. Recommend Brain MRI w/o contrast to assess for any acute abnormalities  3. Recommend Vitamin B12, Folate, Ammonia, TSH, and copper levels   4. Continue current medication per primary team   5. DEFER Leukopenia and Neutropenia to Primary team.

## 2021-02-04 NOTE — H&P ADULT - NSHPPHYSICALEXAM_GEN_ALL_CORE
Vital Signs Last 24 Hrs  T(C): 37.1 (04 Feb 2021 05:14), Max: 37.1 (04 Feb 2021 05:14)  T(F): 98.7 (04 Feb 2021 05:14), Max: 98.7 (04 Feb 2021 05:14)  HR: 98 (04 Feb 2021 05:14) (69 - 99)  BP: 170/98 (04 Feb 2021 05:14) (127/92 - 204/96)  BP(mean): --  RR: 20 (04 Feb 2021 05:14) (17 - 20)  SpO2: 96% (04 Feb 2021 05:14) (96% - 99%)    GENERAL: No acute distress, well-developed  HEAD:  Atraumatic, Normocephalic  EYES: EOMI, PERRLA, conjunctiva and sclera clear  ENT: Oral mucosa moist  NECK: Neck supple  CHEST/LUNG: Clear to auscultation bilaterally; No wheeze, no rales, no rhonchi.    HEART: Regular rate and rhythm; No murmurs, rubs, or gallops  ABDOMEN: Soft, Nontender, Nondistended; Bowel sounds present  EXTREMITIES:  No clubbing, cyanosis, or edema  VASCULAR: Posterior tibialis pulses intact bilaterally  PSYCH: Normal behavior, normal affect  NEUROLOGY: AAOx2  SKIN: grossly warm and dry

## 2021-02-04 NOTE — PATIENT PROFILE ADULT - NSPROEXTENSIONSOFSELF_GEN_A_NUR
Problem: Infection  Goal: Will remain free from infection  Outcome: PROGRESSING AS EXPECTED  Blood pressures being monitored for signs of infection along with heart rate, temperature, and respirations. Patient has only indicated lines and standard precautions and hand hygiene is followed by staff. Patient received education about hand hygiene and was given sani-hands to perform hand hygiene before and after meals and before and after personal hygiene.     Problem: Pain Management  Goal: Pain level will decrease to patient's comfort goal  Outcome: PROGRESSING AS EXPECTED  Educated about importance of treating pain.        eyeglasses

## 2021-02-05 LAB
-  AMIKACIN: SIGNIFICANT CHANGE UP
-  AMOXICILLIN/CLAVULANIC ACID: SIGNIFICANT CHANGE UP
-  AMPICILLIN/SULBACTAM: SIGNIFICANT CHANGE UP
-  AMPICILLIN: SIGNIFICANT CHANGE UP
-  AZTREONAM: SIGNIFICANT CHANGE UP
-  CEFAZOLIN: SIGNIFICANT CHANGE UP
-  CEFEPIME: SIGNIFICANT CHANGE UP
-  CEFOXITIN: SIGNIFICANT CHANGE UP
-  CEFTRIAXONE: SIGNIFICANT CHANGE UP
-  CIPROFLOXACIN: SIGNIFICANT CHANGE UP
-  ERTAPENEM: SIGNIFICANT CHANGE UP
-  GENTAMICIN: SIGNIFICANT CHANGE UP
-  IMIPENEM: SIGNIFICANT CHANGE UP
-  LEVOFLOXACIN: SIGNIFICANT CHANGE UP
-  MEROPENEM: SIGNIFICANT CHANGE UP
-  NITROFURANTOIN: SIGNIFICANT CHANGE UP
-  PIPERACILLIN/TAZOBACTAM: SIGNIFICANT CHANGE UP
-  TIGECYCLINE: SIGNIFICANT CHANGE UP
-  TOBRAMYCIN: SIGNIFICANT CHANGE UP
-  TRIMETHOPRIM/SULFAMETHOXAZOLE: SIGNIFICANT CHANGE UP
A1C WITH ESTIMATED AVERAGE GLUCOSE RESULT: 4.9 % — SIGNIFICANT CHANGE UP (ref 4–5.6)
AMMONIA BLD-MCNC: 23 UMOL/L — SIGNIFICANT CHANGE UP (ref 11–55)
ANION GAP SERPL CALC-SCNC: 12 MMOL/L — SIGNIFICANT CHANGE UP (ref 5–17)
BUN SERPL-MCNC: 19 MG/DL — SIGNIFICANT CHANGE UP (ref 7–23)
CALCIUM SERPL-MCNC: 8.9 MG/DL — SIGNIFICANT CHANGE UP (ref 8.4–10.5)
CHLORIDE SERPL-SCNC: 103 MMOL/L — SIGNIFICANT CHANGE UP (ref 96–108)
CHOLEST SERPL-MCNC: 184 MG/DL — SIGNIFICANT CHANGE UP
CO2 SERPL-SCNC: 24 MMOL/L — SIGNIFICANT CHANGE UP (ref 22–31)
CREAT SERPL-MCNC: 0.6 MG/DL — SIGNIFICANT CHANGE UP (ref 0.5–1.3)
CULTURE RESULTS: SIGNIFICANT CHANGE UP
ESTIMATED AVERAGE GLUCOSE: 94 MG/DL — SIGNIFICANT CHANGE UP (ref 68–114)
GLUCOSE SERPL-MCNC: 95 MG/DL — SIGNIFICANT CHANGE UP (ref 70–99)
HCT VFR BLD CALC: 35.8 % — SIGNIFICANT CHANGE UP (ref 34.5–45)
HDLC SERPL-MCNC: 50 MG/DL — LOW
HGB BLD-MCNC: 11.6 G/DL — SIGNIFICANT CHANGE UP (ref 11.5–15.5)
LIPID PNL WITH DIRECT LDL SERPL: 117 MG/DL — HIGH
MAGNESIUM SERPL-MCNC: 1.8 MG/DL — SIGNIFICANT CHANGE UP (ref 1.6–2.6)
MCHC RBC-ENTMCNC: 27.4 PG — SIGNIFICANT CHANGE UP (ref 27–34)
MCHC RBC-ENTMCNC: 32.4 GM/DL — SIGNIFICANT CHANGE UP (ref 32–36)
MCV RBC AUTO: 84.4 FL — SIGNIFICANT CHANGE UP (ref 80–100)
METHOD TYPE: SIGNIFICANT CHANGE UP
NON HDL CHOLESTEROL: 134 MG/DL — HIGH
NRBC # BLD: 0 /100 WBCS — SIGNIFICANT CHANGE UP (ref 0–0)
ORGANISM # SPEC MICROSCOPIC CNT: SIGNIFICANT CHANGE UP
ORGANISM # SPEC MICROSCOPIC CNT: SIGNIFICANT CHANGE UP
PLATELET # BLD AUTO: 161 K/UL — SIGNIFICANT CHANGE UP (ref 150–400)
POTASSIUM SERPL-MCNC: 3.6 MMOL/L — SIGNIFICANT CHANGE UP (ref 3.5–5.3)
POTASSIUM SERPL-SCNC: 3.6 MMOL/L — SIGNIFICANT CHANGE UP (ref 3.5–5.3)
RBC # BLD: 4.24 M/UL — SIGNIFICANT CHANGE UP (ref 3.8–5.2)
RBC # FLD: 14.7 % — HIGH (ref 10.3–14.5)
SODIUM SERPL-SCNC: 139 MMOL/L — SIGNIFICANT CHANGE UP (ref 135–145)
SPECIMEN SOURCE: SIGNIFICANT CHANGE UP
TRIGL SERPL-MCNC: 86 MG/DL — SIGNIFICANT CHANGE UP
TROPONIN T, HIGH SENSITIVITY RESULT: 57 NG/L — HIGH (ref 0–51)
TROPONIN T, HIGH SENSITIVITY RESULT: 66 NG/L — HIGH (ref 0–51)
TROPONIN T, HIGH SENSITIVITY RESULT: 70 NG/L — HIGH (ref 0–51)
WBC # BLD: 2.85 K/UL — LOW (ref 3.8–10.5)
WBC # FLD AUTO: 2.85 K/UL — LOW (ref 3.8–10.5)

## 2021-02-05 PROCEDURE — 99222 1ST HOSP IP/OBS MODERATE 55: CPT

## 2021-02-05 PROCEDURE — 93010 ELECTROCARDIOGRAM REPORT: CPT

## 2021-02-05 PROCEDURE — 99233 SBSQ HOSP IP/OBS HIGH 50: CPT

## 2021-02-05 PROCEDURE — 70551 MRI BRAIN STEM W/O DYE: CPT | Mod: 26

## 2021-02-05 RX ORDER — CEFTRIAXONE 500 MG/1
1000 INJECTION, POWDER, FOR SOLUTION INTRAMUSCULAR; INTRAVENOUS EVERY 24 HOURS
Refills: 0 | Status: DISCONTINUED | OUTPATIENT
Start: 2021-02-05 | End: 2021-02-07

## 2021-02-05 RX ORDER — AMLODIPINE BESYLATE 2.5 MG/1
7.5 TABLET ORAL DAILY
Refills: 0 | Status: DISCONTINUED | OUTPATIENT
Start: 2021-02-06 | End: 2021-02-07

## 2021-02-05 RX ORDER — CEFTRIAXONE 500 MG/1
1000 INJECTION, POWDER, FOR SOLUTION INTRAMUSCULAR; INTRAVENOUS EVERY 12 HOURS
Refills: 0 | Status: DISCONTINUED | OUTPATIENT
Start: 2021-02-05 | End: 2021-02-05

## 2021-02-05 RX ADMIN — Medication 10 MILLIGRAM(S): at 13:36

## 2021-02-05 RX ADMIN — ENOXAPARIN SODIUM 40 MILLIGRAM(S): 100 INJECTION SUBCUTANEOUS at 13:36

## 2021-02-05 RX ADMIN — AMLODIPINE BESYLATE 2.5 MILLIGRAM(S): 2.5 TABLET ORAL at 06:19

## 2021-02-05 RX ADMIN — CEFTRIAXONE 100 MILLIGRAM(S): 500 INJECTION, POWDER, FOR SOLUTION INTRAMUSCULAR; INTRAVENOUS at 21:35

## 2021-02-05 RX ADMIN — Medication 3 MILLIGRAM(S): at 21:35

## 2021-02-05 NOTE — CHART NOTE - NSCHARTNOTEFT_GEN_A_CORE
Patient is a 86y old  Female who presents with a chief complaint of Altered mental status. Called by rn to notify that patient has troponin that resulted higher than prior. With no plan for an elevated trop trending up, I called hospitalist for recs. the night hospitalist suggested if no chest pain repeat trop with morning labs. patient is sleeping with NAD. will continue to montor patient for worsening condition.      Vital Signs Last 24 Hrs  T(C): 36.3 (05 Feb 2021 20:51), Max: 36.4 (05 Feb 2021 14:28)  T(F): 97.4 (05 Feb 2021 20:51), Max: 97.5 (05 Feb 2021 14:28)  HR: 90 (05 Feb 2021 20:58) (88 - 105)  BP: 146/85 (05 Feb 2021 20:58) (146/85 - 166/80)  BP(mean): --  RR: 18 (05 Feb 2021 20:51) (18 - 18)  SpO2: 96% (05 Feb 2021 20:51) (95% - 96%)      Labs:                          11.6   2.85  )-----------( 161      ( 05 Feb 2021 02:13 )             35.8     02-05    139  |  103  |  19  ----------------------------<  95  3.6   |  24  |  0.60    Ca    8.9      05 Feb 2021 02:13  Phos  3.0     02-04  Mg     1.8     02-05    TPro  7.6  /  Alb  4.1  /  TBili  1.0  /  DBili  x   /  AST  25  /  ALT  11  /  AlkPhos  52  02-04        Radiology:    Physical Exam:  General: WN/WD NAD  Neurology: A&Ox3, nonfocal, LUJAN x 4  Head:  Normocephalic, atraumatic  Respiratory: CTA B/L  CV: RRR, S1S2, no murmur  Abdominal: Soft, NT, ND no palpable mass  MSK: No edema, + peripheral pulses, FROM all 4 extremity    Assessment & Plan:  HPI:  86y f PMHx HTN, macular degeneration, depression, diverticulitis BIBA for AMS. Pt's daughter reports concern for AMS x2 days with patient becoming more confused at home. Patient currently continues to be somewhat confused, knowing she is in the hospital and who she is but not remembering why she is here. Patient complains of increased urinary frequency/urgency. Per ER records had c/o R frontal headache as well but currently denies. Denies any fevers or chills/nausea/vomiting/flank pain. Denies dysuria. No cough/myalgias.  (04 Feb 2021 06:11)    >  >  >  >  I&O's Detail    04 Feb 2021 07:01  -  05 Feb 2021 07:00  --------------------------------------------------------  IN:    IV PiggyBack: 50 mL    Oral Fluid: 240 mL  Total IN: 290 mL    OUT:  Total OUT: 0 mL    Total NET: 290 mL      05 Feb 2021 07:01  -  05 Feb 2021 23:50  --------------------------------------------------------  IN:    IV PiggyBack: 50 mL    Oral Fluid: 720 mL  Total IN: 770 mL    OUT:  Total OUT: 0 mL    Total NET: 770 mL            Follow up with Attending in AM.

## 2021-02-06 LAB
ALBUMIN SERPL ELPH-MCNC: 4 G/DL — SIGNIFICANT CHANGE UP (ref 3.3–5)
ALP SERPL-CCNC: 47 U/L — SIGNIFICANT CHANGE UP (ref 40–120)
ALT FLD-CCNC: 11 U/L — SIGNIFICANT CHANGE UP (ref 10–45)
ANION GAP SERPL CALC-SCNC: 13 MMOL/L — SIGNIFICANT CHANGE UP (ref 5–17)
AST SERPL-CCNC: 24 U/L — SIGNIFICANT CHANGE UP (ref 10–40)
BILIRUB SERPL-MCNC: 0.6 MG/DL — SIGNIFICANT CHANGE UP (ref 0.2–1.2)
BUN SERPL-MCNC: 23 MG/DL — SIGNIFICANT CHANGE UP (ref 7–23)
CALCIUM SERPL-MCNC: 9 MG/DL — SIGNIFICANT CHANGE UP (ref 8.4–10.5)
CHLORIDE SERPL-SCNC: 103 MMOL/L — SIGNIFICANT CHANGE UP (ref 96–108)
CO2 SERPL-SCNC: 24 MMOL/L — SIGNIFICANT CHANGE UP (ref 22–31)
CREAT SERPL-MCNC: 0.76 MG/DL — SIGNIFICANT CHANGE UP (ref 0.5–1.3)
GLUCOSE SERPL-MCNC: 84 MG/DL — SIGNIFICANT CHANGE UP (ref 70–99)
HCT VFR BLD CALC: 37.2 % — SIGNIFICANT CHANGE UP (ref 34.5–45)
HGB BLD-MCNC: 12.2 G/DL — SIGNIFICANT CHANGE UP (ref 11.5–15.5)
MAGNESIUM SERPL-MCNC: 1.9 MG/DL — SIGNIFICANT CHANGE UP (ref 1.6–2.6)
MCHC RBC-ENTMCNC: 27.7 PG — SIGNIFICANT CHANGE UP (ref 27–34)
MCHC RBC-ENTMCNC: 32.8 GM/DL — SIGNIFICANT CHANGE UP (ref 32–36)
MCV RBC AUTO: 84.4 FL — SIGNIFICANT CHANGE UP (ref 80–100)
NRBC # BLD: 0 /100 WBCS — SIGNIFICANT CHANGE UP (ref 0–0)
PHOSPHATE SERPL-MCNC: 3.5 MG/DL — SIGNIFICANT CHANGE UP (ref 2.5–4.5)
PLATELET # BLD AUTO: 149 K/UL — LOW (ref 150–400)
POTASSIUM SERPL-MCNC: 3.3 MMOL/L — LOW (ref 3.5–5.3)
POTASSIUM SERPL-SCNC: 3.3 MMOL/L — LOW (ref 3.5–5.3)
PROT SERPL-MCNC: 7.4 G/DL — SIGNIFICANT CHANGE UP (ref 6–8.3)
RBC # BLD: 4.41 M/UL — SIGNIFICANT CHANGE UP (ref 3.8–5.2)
RBC # FLD: 14.8 % — HIGH (ref 10.3–14.5)
SODIUM SERPL-SCNC: 140 MMOL/L — SIGNIFICANT CHANGE UP (ref 135–145)
TROPONIN T, HIGH SENSITIVITY RESULT: 63 NG/L — HIGH (ref 0–51)
WBC # BLD: 2.28 K/UL — LOW (ref 3.8–10.5)
WBC # FLD AUTO: 2.28 K/UL — LOW (ref 3.8–10.5)

## 2021-02-06 PROCEDURE — 99233 SBSQ HOSP IP/OBS HIGH 50: CPT

## 2021-02-06 RX ORDER — POTASSIUM CHLORIDE 20 MEQ
40 PACKET (EA) ORAL
Refills: 0 | Status: COMPLETED | OUTPATIENT
Start: 2021-02-06 | End: 2021-02-06

## 2021-02-06 RX ORDER — MAGNESIUM SULFATE 500 MG/ML
1 VIAL (ML) INJECTION ONCE
Refills: 0 | Status: COMPLETED | OUTPATIENT
Start: 2021-02-06 | End: 2021-02-06

## 2021-02-06 RX ORDER — ASCORBIC ACID 60 MG
500 TABLET,CHEWABLE ORAL DAILY
Refills: 0 | Status: DISCONTINUED | OUTPATIENT
Start: 2021-02-06 | End: 2021-02-07

## 2021-02-06 RX ADMIN — Medication 3 MILLIGRAM(S): at 21:55

## 2021-02-06 RX ADMIN — ENOXAPARIN SODIUM 40 MILLIGRAM(S): 100 INJECTION SUBCUTANEOUS at 12:38

## 2021-02-06 RX ADMIN — Medication 10 MILLIGRAM(S): at 12:38

## 2021-02-06 RX ADMIN — Medication 40 MILLIEQUIVALENT(S): at 12:37

## 2021-02-06 RX ADMIN — Medication 40 MILLIEQUIVALENT(S): at 15:57

## 2021-02-06 RX ADMIN — Medication 100 GRAM(S): at 12:37

## 2021-02-06 RX ADMIN — CEFTRIAXONE 100 MILLIGRAM(S): 500 INJECTION, POWDER, FOR SOLUTION INTRAMUSCULAR; INTRAVENOUS at 21:55

## 2021-02-06 RX ADMIN — AMLODIPINE BESYLATE 7.5 MILLIGRAM(S): 2.5 TABLET ORAL at 05:32

## 2021-02-06 NOTE — PROGRESS NOTE ADULT - ATTENDING COMMENTS
LBBB noted -- patient reports hx of covid in April. Covid CM? No prior hx of heart issues. Trop peaked. No anginal equivalents on exam. Ext warm and well perfused. Vitals overall stable. TTE pending.   Suspect demand ischemia in setting of dehydration and uti.
LBBB noted -- patient reports hx of covid in April. Covid CM? No prior hx of heart issues. Trop peaked. No anginal equivalents on exam. Ext warm and well perfused. Vitals overall stable. TTE pending.   Suspect demand ischemia in setting of dehydration and uti.    Daughter called but no answer.

## 2021-02-06 NOTE — PROGRESS NOTE ADULT - PROBLEM SELECTOR PLAN 3
Carries diagnosis.   Not on Rx currently.   Continue to reorient.
Carries diagnosis.   Not on Rx currently.   Continue to reorient.  Enhanced supervision
Carries diagnosis.   Not on Rx currently.   Continue to reorient.  Enhanced supervision

## 2021-02-06 NOTE — DIETITIAN INITIAL EVALUATION ADULT. - PERTINENT MEDS FT
MEDICATIONS  (STANDING):  amLODIPine   Tablet 7.5 milliGRAM(s) Oral daily  cefTRIAXone   IVPB 1000 milliGRAM(s) IV Intermittent every 24 hours  enoxaparin Injectable 40 milliGRAM(s) SubCutaneous daily  FLUoxetine 10 milliGRAM(s) Oral daily  magnesium sulfate  IVPB 1 Gram(s) IV Intermittent once  melatonin 3 milliGRAM(s) Oral <User Schedule>  potassium chloride   Powder 40 milliEquivalent(s) Oral every 2 hours    MEDICATIONS  (PRN):  LORazepam     Tablet 0.5 milliGRAM(s) Oral at bedtime PRN insomnia/anxiety

## 2021-02-06 NOTE — DIETITIAN INITIAL EVALUATION ADULT. - PERTINENT LABORATORY DATA
02-06 @ 07:04: Na 140, BUN 23, Cr 0.76, BG 84, K+ 3.3<L>, Phos 3.5, Mg 1.9, Alk Phos 47, ALT/SGPT 11, AST/SGOT 24

## 2021-02-06 NOTE — PHYSICAL THERAPY INITIAL EVALUATION ADULT - PRECAUTIONS/LIMITATIONS, REHAB EVAL
(-) CXR 2/3. CT Head 2/3: (-) CT evidence of acute intracranial hemorrhage, brain edema, or mass effect. MR Head 2/5: (-) for acute pathology. (-) CXR 2/3. CT Head 2/3: (-) CT evidence of acute intracranial hemorrhage, brain edema, or mass effect. MR Head 2/5: (-) for acute pathology./fall precautions

## 2021-02-06 NOTE — PHYSICAL THERAPY INITIAL EVALUATION ADULT - ACTIVE RANGE OF MOTION EXAMINATION, REHAB EVAL
waldemar. upper extremity Active ROM was WNL (within normal limits)/bilateral lower extremity Active ROM was WNL (within normal limits)

## 2021-02-06 NOTE — DIETITIAN NUTRITION RISK NOTIFICATION - TREATMENT: THE FOLLOWING DIET HAS BEEN RECOMMENDED
Diet, DASH/TLC:   Sodium & Cholesterol Restricted  Shyam(7 Gm Arginine/7 Gm Glut/1.2 Gm HMB     Qty per Day:  2  Supplement Feeding Modality:  Oral  Ensure Enlive Cans or Servings Per Day:  1       Frequency:  Daily (02-06-21 @ 12:45) [Pending Verification By Attending]  Diet, Regular:   DASH/TLC {Sodium & Cholesterol Restricted} (DASH) (02-04-21 @ 07:36) [Active]

## 2021-02-06 NOTE — DIETITIAN INITIAL EVALUATION ADULT. - OTHER INFO
Intake : 25% -pt agrees to supplement, flow sheet indicates 50%  Denies nausea/vomit :  Denies difficulty chewing /swallow :  Denies diarrhea/constipation:  Last BM : today  NKFA  IBW +/- 10%= 145pounds  Ht: 69"  Ht taken from pt  Dosing ht: 175.3cm  Usual Weight PTA: 153pounds  Dosing wt: 69.4kg  BMI: 22.5  BMI calculated using wt from dosing  BMI calculated using ht from dosing  wt used to calculate needs: dosing  Education Provided : pt was educated on the importance of supplements to increase calorie and protein intake in light of RD's nutritional findings.   pressure injury: right buttock-stage 2  edema: +2 left leg, right leg

## 2021-02-06 NOTE — PHYSICAL THERAPY INITIAL EVALUATION ADULT - PERTINENT HX OF CURRENT PROBLEM, REHAB EVAL
86F w/ PMHx HTN, macular degeneration, depression, diverticulitis BIBA for AMS. Pt's dtr reports concern for AMS x2 days with pt becoming more confused at home. Pt currently continues to be somewhat confused, knowing she is in the hospital and who she is but not remembering why she is here. Pt c/o inc'd urinary frequency/urgency. Per ER records had c/o R frontal headache as well but currently denies. CONT'D BELOW:

## 2021-02-06 NOTE — DIETITIAN INITIAL EVALUATION ADULT. - ADD RECOMMEND
continue DASH diet, recommend Ensure Enlive 1 x daily(strawberry). recommend multivitamin with minerals and Vit C daily. recommend Shyam x 2 daily. pending verification placed, malnutrition sticker placed

## 2021-02-06 NOTE — PROGRESS NOTE ADULT - SUBJECTIVE AND OBJECTIVE BOX
HOSPITALIST NOTE    Dr. Brien Hernández DO  Attending Physician  Division of Hospital Medicine  [facility]  Pager:  [phone]    SUBJECTIVE  No acute complaints.    REVIEW OF SYSTEMS  10 point review of systems negative except for above.     PAST MEDICAL & SURGICAL HISTORY:  Hyperlipidemia    OA (osteoarthritis)  knee and hip    MVP (mitral valve prolapse)    Macular degeneration  right eye    Diverticulitis    Depression    Anxiety    S/P left cataract extraction  right and left    Status post cholecystectomy        MEDICATIONS  (STANDING):  amLODIPine   Tablet 2.5 milliGRAM(s) Oral daily  cefTRIAXone   IVPB 1000 milliGRAM(s) IV Intermittent every 24 hours  enoxaparin Injectable 40 milliGRAM(s) SubCutaneous daily  FLUoxetine 10 milliGRAM(s) Oral daily  melatonin 3 milliGRAM(s) Oral <User Schedule>    MEDICATIONS  (PRN):  LORazepam     Tablet 0.5 milliGRAM(s) Oral at bedtime PRN insomnia/anxiety      Allergies    No Known Allergies    Intolerances        T(C): 36.3 (21 @ 06:16), Max: 36.7 (21 @ 14:39)  T(F): 97.3 (21 @ 06:16), Max: 98.1 (21 @ 14:39)  HR: 88 (21 @ 06:16) (88 - 108)  BP: 166/80 (21 @ 06:16) (139/76 - 175/89)  ABP: --  ABP(mean): --  RR: 18 (21 @ 06:16) (18 - 20)  SpO2: 95% (21 @ 06:16) (92% - 95%)      CONSTITUTIONAL: No acute distress.   HEENT:  Conjunctiva clear B/L.  Moist oral mucosa.   Cardiovascular: RRR with no murmurs. No JVD noted. No lower extremity edema B/L. Extremities are warm and well perfused. Radial pulses 2+ B/L. Dorsalis pedis pulses 2+ B/L.    Respiratory: Lungs CTAB. No wrr. No accessory muscle use.   Gastrointestinal:  Soft, nontender. Non-distended. Non-rigid. No CVA tenderness B/L.  MSK:  No joint swelling. No joint erythema B/L. No midline spinal tenderness.  Neurologic:  Alert and awake. Oriented x2. Moving all extremities. Following commands. Making eye contact. EOMI. PERRL. No nystagmus. Brachial and Patellar reflexes 2/4 b/l and equal. No clonus. Babinski down going. Finger to nose testing wnl. Deferred gait testing.   Skin:  No rashes noted. No skin erythema noted.   Psych:  Normal affect. Normal Mood.       LABS                        11.6   2.85  )-----------( 161      ( 2021 02:13 )             35.8     02-05    139  |  103  |  19  ----------------------------<  95  3.6   |  24  |  0.60    Ca    8.9      2021 02:13  Phos  3.0     02-04  Mg     1.8     -05    TPro  7.6  /  Alb  4.1  /  TBili  1.0  /  DBili  x   /  AST  25  /  ALT  11  /  AlkPhos  52  02-04      Urinalysis Basic - ( 2021 20:46 )    Color: Light Yellow / Appearance: Slightly Turbid / S.015 / pH: x  Gluc: x / Ketone: Small  / Bili: Negative / Urobili: Negative   Blood: x / Protein: 100 / Nitrite: Negative   Leuk Esterase: Small / RBC: 4 /hpf / WBC 2 /HPF   Sq Epi: x / Non Sq Epi: 2 /hpf / Bacteria: Many        ADDITIONAL STUDIES PERSONALLY REVIEWED    Our team discussed the case with all consults    All consultant contribution to care greatly appreciated. 
HOSPITALIST NOTE    Dr. Brien Hernández DO  Attending Physician  Division of Hospital Medicine  [facility]  Pager:  [phone]    SUBJECTIVE  No acute complaints.  Requesting to come home.     REVIEW OF SYSTEMS  10 point review of systems negative except for above.     PAST MEDICAL & SURGICAL HISTORY:  Hyperlipidemia    OA (osteoarthritis)  knee and hip    MVP (mitral valve prolapse)    Macular degeneration  right eye    Diverticulitis    Depression    Anxiety    S/P left cataract extraction  right and left    Status post cholecystectomy        MEDICATIONS  (STANDING):  amLODIPine   Tablet 2.5 milliGRAM(s) Oral daily  cefTRIAXone   IVPB 1000 milliGRAM(s) IV Intermittent every 24 hours  enoxaparin Injectable 40 milliGRAM(s) SubCutaneous daily  FLUoxetine 10 milliGRAM(s) Oral daily  potassium chloride    Tablet ER 40 milliEquivalent(s) Oral every 4 hours    MEDICATIONS  (PRN):  LORazepam     Tablet 0.5 milliGRAM(s) Oral at bedtime PRN insomnia/anxiety      Allergies    No Known Allergies    Intolerances        T(C): 36.5 (21 @ 08:25), Max: 37.1 (21 @ 05:14)  T(F): 97.7 (21 @ 08:25), Max: 98.7 (21 @ 05:14)  HR: 93 (21 @ 08:25) (69 - 99)  BP: 162/80 (21 @ 08:25) (127/92 - 204/96)  ABP: --  ABP(mean): --  RR: 20 (21 @ 08:25) (17 - 20)  SpO2: 93% (21 @ 08:25) (93% - 99%)      CONSTITUTIONAL: No acute distress.   HEENT:  Conjunctiva clear B/L.  Moist oral mucosa.   Cardiovascular: RRR with no murmurs. No JVD noted. No lower extremity edema B/L. Extremities are warm and well perfused. Radial pulses 2+ B/L. Dorsalis pedis pulses 2+ B/L.    Respiratory: Lungs CTAB. No wrr. No accessory muscle use.   Gastrointestinal:  Soft, nontender. Non-distended. Non-rigid. No CVA tenderness B/L.  MSK:  No joint swelling. No joint erythema B/L. No midline spinal tenderness.  Neurologic:  Alert and awake. Oriented x2. Moving all extremities. Following commands. Making eye contact. EOMI. PERRL. No nystagmus. Brachial and Patellar reflexes 3/4 b/l but equal. No clonus. Babinski down going. Finger to nose testing equivocal. Deferred gait testing.   Skin:  No rashes noted. No skin erythema noted.   Psych:  Normal affect. Normal Mood.     LABS                        12.4   2.61  )-----------( 166      ( 2021 11:21 )             37.6     02-04    137  |  102  |  14  ----------------------------<  97  3.3<L>   |  20<L>  |  0.54    Ca    9.0      2021 11:21  Phos  3.0     02-04  Mg     1.7     -04    TPro  7.6  /  Alb  4.1  /  TBili  1.0  /  DBili  x   /  AST  25  /  ALT  11  /  AlkPhos  52  02-04      Urinalysis Basic - ( 2021 20:46 )    Color: Light Yellow / Appearance: Slightly Turbid / S.015 / pH: x  Gluc: x / Ketone: Small  / Bili: Negative / Urobili: Negative   Blood: x / Protein: 100 / Nitrite: Negative   Leuk Esterase: Small / RBC: 4 /hpf / WBC 2 /HPF   Sq Epi: x / Non Sq Epi: 2 /hpf / Bacteria: Many        ADDITIONAL STUDIES PERSONALLY REVIEWED    Our team discussed the case with all consults    All consultant contribution to care greatly appreciated. 
HOSPITALIST NOTE    Dr. Brien Hernández DO  Attending Physician  Division of Hospital Medicine  [facility]  Pager:  [phone]    SUBJECTIVE  No acute complaints.    REVIEW OF SYSTEMS  10 point review of systems negative except for above.     PAST MEDICAL & SURGICAL HISTORY:  Hyperlipidemia    OA (osteoarthritis)  knee and hip    MVP (mitral valve prolapse)    Macular degeneration  right eye    Diverticulitis    Depression    Anxiety    S/P left cataract extraction  right and left    Status post cholecystectomy        MEDICATIONS  (STANDING):  amLODIPine   Tablet 7.5 milliGRAM(s) Oral daily  cefTRIAXone   IVPB 1000 milliGRAM(s) IV Intermittent every 24 hours  enoxaparin Injectable 40 milliGRAM(s) SubCutaneous daily  FLUoxetine 10 milliGRAM(s) Oral daily  melatonin 3 milliGRAM(s) Oral <User Schedule>  potassium chloride   Powder 40 milliEquivalent(s) Oral every 2 hours    MEDICATIONS  (PRN):  LORazepam     Tablet 0.5 milliGRAM(s) Oral at bedtime PRN insomnia/anxiety      Allergies    No Known Allergies    Intolerances        T(C): 36.6 (02-06-21 @ 13:06), Max: 36.6 (02-06-21 @ 13:06)  T(F): 97.9 (02-06-21 @ 13:06), Max: 97.9 (02-06-21 @ 13:06)  HR: 93 (02-06-21 @ 13:06) (88 - 105)  BP: 154/81 (02-06-21 @ 13:06) (146/85 - 163/83)  ABP: --  ABP(mean): --  RR: 17 (02-06-21 @ 13:06) (17 - 18)  SpO2: 96% (02-06-21 @ 13:06) (96% - 96%)      CONSTITUTIONAL: No acute distress.   HEENT:  Conjunctiva clear B/L.  Moist oral mucosa.   Cardiovascular: RRR with no murmurs. No JVD noted. No lower extremity edema B/L. Extremities are warm and well perfused. Radial pulses 2+ B/L. Dorsalis pedis pulses 2+ B/L.    Respiratory: Lungs CTAB. No wrr. No accessory muscle use.   Gastrointestinal:  Soft, nontender. Non-distended. Non-rigid. No CVA tenderness B/L.  MSK:  No joint swelling. No joint erythema B/L. No midline spinal tenderness.  Neurologic:  Alert and awake. Oriented x2. Moving all extremities. Following commands. Making eye contact. EOMI. PERRL. No nystagmus. Brachial and Patellar reflexes 2/4 b/l and equal. No clonus. Babinski down going. Finger to nose testing wnl. Deferred gait testing.   Skin:  No rashes noted. No skin erythema noted.   Psych:  Normal affect. Normal Mood.     LABS                        12.2   2.28  )-----------( 149      ( 06 Feb 2021 07:13 )             37.2     02-06    140  |  103  |  23  ----------------------------<  84  3.3<L>   |  24  |  0.76    Ca    9.0      06 Feb 2021 07:04  Phos  3.5     02-06  Mg     1.9     02-06    TPro  7.4  /  Alb  4.0  /  TBili  0.6  /  DBili  x   /  AST  24  /  ALT  11  /  AlkPhos  47  02-06          ADDITIONAL STUDIES PERSONALLY REVIEWED    Our team discussed the case with all consults    All consultant contribution to care greatly appreciated.

## 2021-02-06 NOTE — PROGRESS NOTE ADULT - PROBLEM SELECTOR PLAN 1
Metabolic.  Presumably from complicated UTI. . UTI less likely given below.   MRI w/o acute process.  EEG pending.   Neuro consulted. Input into case appreciated.
Unspecified. Other.  Unclear etiology. Given report of word finding difficulties and gait instability starting 2 days prior to admission, we must consider cva/tia. UTI less likely given below.   B12, folate, rpr, and tsh pending.   Discussed possibility of further advanced imaging with daughter. To discuss with neuro first.   Neuro consulted. Input into case appreciated.
Metabolic.  Presumably from complicated UTI. . UTI less likely given below.   MRI w/o acute process.  EEG pending.   Neuro consulted. Input into case appreciated.

## 2021-02-06 NOTE — PROGRESS NOTE ADULT - PROBLEM SELECTOR PLAN 2
UA with < 5 WBCs but Ucx positive.   Can continue CTX for now.  Ucx noted. Keep IV for now.
UA with < 5 WBCs but Ucx positive.   Can continue CTX for now.  F/u Ucx.
UA with < 5 WBCs making UTI less likely.  Can continue CTX for now.  Ucx pending.

## 2021-02-06 NOTE — DIETITIAN INITIAL EVALUATION ADULT. - ORAL INTAKE PTA/DIET HISTORY
does not eat breakfast, chicken sandwich and fruit for lunch, her daughter prepare dinner and pt eat what daughter makes. she does not snack. she does drink Ensure at times. she is requesting strawberry flavor-1 x daily. she complained about not liking the food-RD provided pt with Diet Office phone number and pointed out to pt that she had a phone available to call them.

## 2021-02-06 NOTE — PHYSICAL THERAPY INITIAL EVALUATION ADULT - DISCHARGE DISPOSITION, PT EVAL
Home with Home PT to inc strength, balance and gait, fall prevention education. Owns single cane and RW./home w/ home PT

## 2021-02-06 NOTE — PROGRESS NOTE ADULT - ASSESSMENT
86y f PMHx HTN, macular degeneration, depression, diverticulitis BIBA for AMS. Pt's daughter reports concern for AMS x2 days with word finding difficulties and gait instability. 

## 2021-02-06 NOTE — PROGRESS NOTE ADULT - PROBLEM SELECTOR PLAN 6
amLODIPine   Tablet 2.5 milliGRAM(s) Oral daily  up-titrate as needed.
amLODIPine   Tablet 7.5 milliGRAM(s) Oral daily  up-titrate as needed.
amLODIPine   Tablet 7.5 milliGRAM(s) Oral daily  up-titrate as needed.

## 2021-02-06 NOTE — PHYSICAL THERAPY INITIAL EVALUATION ADULT - ADDITIONAL COMMENTS
Pt lives with her dtr in a PH, 5-6 TIFFANIE, and 2 flights to bedroom, +B HR's. Previously (I) with ADLs and ambulated with single cane. Has walk-in shower, (-) grab bar, (+) shower chair. DME: [facility]

## 2021-02-06 NOTE — PROGRESS NOTE ADULT - NUTRITIONAL ASSESSMENT
This patient has been assessed with a concern for Malnutrition and has been determined to have a diagnosis/diagnoses of Severe protein-calorie malnutrition.    This patient is being managed with:   Diet Regular-  DASH/TLC {Sodium & Cholesterol Restricted} (DASH)  Entered: Feb 4 2021  7:34AM    The following pending diet order is being considered for treatment of Severe protein-calorie malnutrition:  Diet DASH/TLC-  Sodium & Cholesterol Restricted  Shyam(7 Gm Arginine/7 Gm Glut/1.2 Gm HMB     Qty per Day:  2  Supplement Feeding Modality:  Oral  Ensure Enlive Cans or Servings Per Day:  1       Frequency:  Daily  Entered: Feb 6 2021 12:41PM

## 2021-02-07 ENCOUNTER — TRANSCRIPTION ENCOUNTER (OUTPATIENT)
Age: 86
End: 2021-02-07

## 2021-02-07 VITALS — DIASTOLIC BLOOD PRESSURE: 72 MMHG | SYSTOLIC BLOOD PRESSURE: 123 MMHG | HEART RATE: 87 BPM

## 2021-02-07 LAB
ANION GAP SERPL CALC-SCNC: 13 MMOL/L — SIGNIFICANT CHANGE UP (ref 5–17)
BUN SERPL-MCNC: 22 MG/DL — SIGNIFICANT CHANGE UP (ref 7–23)
CALCIUM SERPL-MCNC: 8.8 MG/DL — SIGNIFICANT CHANGE UP (ref 8.4–10.5)
CHLORIDE SERPL-SCNC: 102 MMOL/L — SIGNIFICANT CHANGE UP (ref 96–108)
CO2 SERPL-SCNC: 24 MMOL/L — SIGNIFICANT CHANGE UP (ref 22–31)
CREAT SERPL-MCNC: 0.67 MG/DL — SIGNIFICANT CHANGE UP (ref 0.5–1.3)
GLUCOSE SERPL-MCNC: 86 MG/DL — SIGNIFICANT CHANGE UP (ref 70–99)
HCT VFR BLD CALC: 39.9 % — SIGNIFICANT CHANGE UP (ref 34.5–45)
HGB BLD-MCNC: 13.3 G/DL — SIGNIFICANT CHANGE UP (ref 11.5–15.5)
MAGNESIUM SERPL-MCNC: 2 MG/DL — SIGNIFICANT CHANGE UP (ref 1.6–2.6)
MCHC RBC-ENTMCNC: 28.1 PG — SIGNIFICANT CHANGE UP (ref 27–34)
MCHC RBC-ENTMCNC: 33.3 GM/DL — SIGNIFICANT CHANGE UP (ref 32–36)
MCV RBC AUTO: 84.2 FL — SIGNIFICANT CHANGE UP (ref 80–100)
NRBC # BLD: 0 /100 WBCS — SIGNIFICANT CHANGE UP (ref 0–0)
PLATELET # BLD AUTO: 170 K/UL — SIGNIFICANT CHANGE UP (ref 150–400)
POTASSIUM SERPL-MCNC: 3.5 MMOL/L — SIGNIFICANT CHANGE UP (ref 3.5–5.3)
POTASSIUM SERPL-SCNC: 3.5 MMOL/L — SIGNIFICANT CHANGE UP (ref 3.5–5.3)
RBC # BLD: 4.74 M/UL — SIGNIFICANT CHANGE UP (ref 3.8–5.2)
RBC # FLD: 14.8 % — HIGH (ref 10.3–14.5)
SODIUM SERPL-SCNC: 139 MMOL/L — SIGNIFICANT CHANGE UP (ref 135–145)
WBC # BLD: 2.07 K/UL — LOW (ref 3.8–10.5)
WBC # FLD AUTO: 2.07 K/UL — LOW (ref 3.8–10.5)

## 2021-02-07 PROCEDURE — 83036 HEMOGLOBIN GLYCOSYLATED A1C: CPT

## 2021-02-07 PROCEDURE — 84295 ASSAY OF SERUM SODIUM: CPT

## 2021-02-07 PROCEDURE — 85027 COMPLETE CBC AUTOMATED: CPT

## 2021-02-07 PROCEDURE — 97161 PT EVAL LOW COMPLEX 20 MIN: CPT

## 2021-02-07 PROCEDURE — 82947 ASSAY GLUCOSE BLOOD QUANT: CPT

## 2021-02-07 PROCEDURE — 84100 ASSAY OF PHOSPHORUS: CPT

## 2021-02-07 PROCEDURE — 84484 ASSAY OF TROPONIN QUANT: CPT

## 2021-02-07 PROCEDURE — 87086 URINE CULTURE/COLONY COUNT: CPT

## 2021-02-07 PROCEDURE — 82140 ASSAY OF AMMONIA: CPT

## 2021-02-07 PROCEDURE — 99285 EMERGENCY DEPT VISIT HI MDM: CPT

## 2021-02-07 PROCEDURE — 85025 COMPLETE CBC W/AUTO DIFF WBC: CPT

## 2021-02-07 PROCEDURE — 87186 SC STD MICRODIL/AGAR DIL: CPT

## 2021-02-07 PROCEDURE — 99239 HOSP IP/OBS DSCHRG MGMT >30: CPT

## 2021-02-07 PROCEDURE — 82607 VITAMIN B-12: CPT

## 2021-02-07 PROCEDURE — 82435 ASSAY OF BLOOD CHLORIDE: CPT

## 2021-02-07 PROCEDURE — 86780 TREPONEMA PALLIDUM: CPT

## 2021-02-07 PROCEDURE — U0005: CPT

## 2021-02-07 PROCEDURE — 83605 ASSAY OF LACTIC ACID: CPT

## 2021-02-07 PROCEDURE — 70551 MRI BRAIN STEM W/O DYE: CPT

## 2021-02-07 PROCEDURE — 80048 BASIC METABOLIC PNL TOTAL CA: CPT

## 2021-02-07 PROCEDURE — 82962 GLUCOSE BLOOD TEST: CPT

## 2021-02-07 PROCEDURE — 82803 BLOOD GASES ANY COMBINATION: CPT

## 2021-02-07 PROCEDURE — U0003: CPT

## 2021-02-07 PROCEDURE — 86769 SARS-COV-2 COVID-19 ANTIBODY: CPT

## 2021-02-07 PROCEDURE — 93005 ELECTROCARDIOGRAM TRACING: CPT

## 2021-02-07 PROCEDURE — 83735 ASSAY OF MAGNESIUM: CPT

## 2021-02-07 PROCEDURE — 85014 HEMATOCRIT: CPT

## 2021-02-07 PROCEDURE — 82746 ASSAY OF FOLIC ACID SERUM: CPT

## 2021-02-07 PROCEDURE — 82330 ASSAY OF CALCIUM: CPT

## 2021-02-07 PROCEDURE — 82525 ASSAY OF COPPER: CPT

## 2021-02-07 PROCEDURE — 71045 X-RAY EXAM CHEST 1 VIEW: CPT

## 2021-02-07 PROCEDURE — 84443 ASSAY THYROID STIM HORMONE: CPT

## 2021-02-07 PROCEDURE — 70450 CT HEAD/BRAIN W/O DYE: CPT

## 2021-02-07 PROCEDURE — 85018 HEMOGLOBIN: CPT

## 2021-02-07 PROCEDURE — 84132 ASSAY OF SERUM POTASSIUM: CPT

## 2021-02-07 PROCEDURE — 80053 COMPREHEN METABOLIC PANEL: CPT

## 2021-02-07 PROCEDURE — 81001 URINALYSIS AUTO W/SCOPE: CPT

## 2021-02-07 PROCEDURE — 80061 LIPID PANEL: CPT

## 2021-02-07 RX ORDER — CEFPODOXIME PROXETIL 100 MG
1 TABLET ORAL
Qty: 14 | Refills: 0
Start: 2021-02-07 | End: 2021-02-13

## 2021-02-07 RX ORDER — AMLODIPINE BESYLATE 2.5 MG/1
1 TABLET ORAL
Qty: 0 | Refills: 0 | DISCHARGE
Start: 2021-02-07

## 2021-02-07 RX ORDER — AMLODIPINE BESYLATE 2.5 MG/1
3 TABLET ORAL
Qty: 90 | Refills: 0
Start: 2021-02-07 | End: 2021-03-08

## 2021-02-07 RX ORDER — AMLODIPINE BESYLATE 2.5 MG/1
1 TABLET ORAL
Qty: 0 | Refills: 0 | DISCHARGE

## 2021-02-07 RX ADMIN — Medication 10 MILLIGRAM(S): at 11:37

## 2021-02-07 RX ADMIN — Medication 1 TABLET(S): at 11:37

## 2021-02-07 RX ADMIN — AMLODIPINE BESYLATE 7.5 MILLIGRAM(S): 2.5 TABLET ORAL at 05:01

## 2021-02-07 NOTE — DISCHARGE NOTE PROVIDER - DETAILS OF MALNUTRITION DIAGNOSIS/DIAGNOSES
This patient has been assessed with a concern for Malnutrition and was treated during this hospitalization for the following Nutrition diagnosis/diagnoses:     -  02/06/2021: Severe protein-calorie malnutrition   This patient has been assessed with a concern for Malnutrition and was treated during this hospitalization for the following Nutrition diagnosis/diagnoses:     -  02/06/2021: Severe protein-calorie malnutrition    This patient has been assessed with a concern for Malnutrition and was treated during this hospitalization for the following Nutrition diagnosis/diagnoses:     -  02/06/2021: Severe protein-calorie malnutrition   This patient has been assessed with a concern for Malnutrition and was treated during this hospitalization for the following Nutrition diagnosis/diagnoses:     -  02/06/2021: Severe protein-calorie malnutrition    This patient has been assessed with a concern for Malnutrition and was treated during this hospitalization for the following Nutrition diagnosis/diagnoses:     -  02/06/2021: Severe protein-calorie malnutrition    This patient has been assessed with a concern for Malnutrition and was treated during this hospitalization for the following Nutrition diagnosis/diagnoses:     -  02/06/2021: Severe protein-calorie malnutrition   This patient has been assessed with a concern for Malnutrition and was treated during this hospitalization for the following Nutrition diagnosis/diagnoses:     -  02/06/2021: Severe protein-calorie malnutrition    This patient has been assessed with a concern for Malnutrition and was treated during this hospitalization for the following Nutrition diagnosis/diagnoses:     -  02/06/2021: Severe protein-calorie malnutrition    This patient has been assessed with a concern for Malnutrition and was treated during this hospitalization for the following Nutrition diagnosis/diagnoses:     -  02/06/2021: Severe protein-calorie malnutrition    This patient has been assessed with a concern for Malnutrition and was treated during this hospitalization for the following Nutrition diagnosis/diagnoses:     -  02/06/2021: Severe protein-calorie malnutrition   This patient has been assessed with a concern for Malnutrition and was treated during this hospitalization for the following Nutrition diagnosis/diagnoses:     -  02/06/2021: Severe protein-calorie malnutrition    This patient has been assessed with a concern for Malnutrition and was treated during this hospitalization for the following Nutrition diagnosis/diagnoses:     -  02/06/2021: Severe protein-calorie malnutrition    This patient has been assessed with a concern for Malnutrition and was treated during this hospitalization for the following Nutrition diagnosis/diagnoses:     -  02/06/2021: Severe protein-calorie malnutrition    This patient has been assessed with a concern for Malnutrition and was treated during this hospitalization for the following Nutrition diagnosis/diagnoses:     -  02/06/2021: Severe protein-calorie malnutrition    This patient has been assessed with a concern for Malnutrition and was treated during this hospitalization for the following Nutrition diagnosis/diagnoses:     -  02/06/2021: Severe protein-calorie malnutrition

## 2021-02-07 NOTE — DISCHARGE NOTE PROVIDER - NSDCFUADDAPPT_GEN_ALL_CORE_FT
Please follow up with PMD outpatient in 1-2 weeks  Please follow up with Neurology outpatient 1-2 weeks   Please  follow up EEG outpatient   Please follow up ECHO -  Please follow up with PMD outpatient in 1-2 weeks  Please follow up with Neurology outpatient 1-2 weeks [facility]   Please follow up  with neurology office  #[phone].   Please  follow up EEG outpatient   Please follow up ECHO -

## 2021-02-07 NOTE — DISCHARGE NOTE PROVIDER - NSDCMRMEDTOKEN_GEN_ALL_CORE_FT
amLODIPine 2.5 mg oral tablet: 1 tab(s) orally once a day  aspirin 81 mg oral delayed release tablet: 1 tab(s) orally once a day  docusate sodium 100 mg oral capsule: 1 cap(s) orally 3 times a day, As Needed  Eylea 40 mg/mL intraocular solution:  intraocular  injected every 6 weeks  FLUOXETINE HCL 10 MG CAPSULE: TAKE 1 CAPSULE BY MOUTH EVERY DAY  LORAZEPAM 0.5 MG TABLET: TAKE 1 TABLET BY MOUTH AT BEDTIME  VITAMIN D2 1.25MG(50,000 UNIT): TAKE 1 CAPSULE BY MOUTH ONE TIME PER WEEK   amLODIPine 2.5 mg oral tablet: 3 tab(s) orally once a day  cefpodoxime 200 mg oral tablet: 1 tab(s) orally every 12 hours for 7 more days   docusate sodium 100 mg oral capsule: 1 cap(s) orally 3 times a day, As Needed  Eylea 40 mg/mL intraocular solution:  intraocular  injected every 6 weeks  FLUOXETINE HCL 10 MG CAPSULE: TAKE 1 CAPSULE BY MOUTH EVERY DAY  LORAZEPAM 0.5 MG TABLET: TAKE 1 TABLET BY MOUTH AT BEDTIME  VITAMIN D2 1.25MG(50,000 UNIT): TAKE 1 CAPSULE BY MOUTH ONE TIME PER WEEK

## 2021-02-07 NOTE — DISCHARGE NOTE PROVIDER - NSDCCPCAREPLAN_GEN_ALL_CORE_FT
PRINCIPAL DISCHARGE DIAGNOSIS  Diagnosis: Acute encephalopathy  Assessment and Plan of Treatment: Patient need EEG  Patient's daughter want to take patient home   leaving without medicatical advice      SECONDARY DISCHARGE DIAGNOSES  Diagnosis: Urinary tract infection  Assessment and Plan of Treatment: Status post treatment antibiotic    Diagnosis: Essential hypertension  Assessment and Plan of Treatment: Low salt diet  Activity as tolerated.  Take all medication as prescribed.  Follow up with your medical doctor for routine blood pressure monitoring at your next visit.  Notify your doctor if you have any of the following symptoms:   Dizziness, Lightheadedness, Blurry vision, Headache, Chest pain, Shortness of breath      Diagnosis: Depression, unspecified depression type  Assessment and Plan of Treatment: continue with medication

## 2021-02-07 NOTE — DISCHARGE NOTE PROVIDER - HOSPITAL COURSE
Problem/Plan - 1:  ·  Problem: Acute encephalopathy.  Plan: Metabolic.  Presumably from complicated UTI. . UTI less likely given below.   MRI w/o acute process.  EEG pending.   Neuro consulted. Input into case appreciated.      Problem/Plan - 2:  ·  Problem: Acute cystitis without hematuria.  Plan: UA with < 5 WBCs but Ucx positive.   Can continue CTX for now.  Ucx noted. Keep IV for now.      Problem/Plan - 3:  ·  Problem: Delirium.  Plan: Carries diagnosis.   Not on Rx currently.   Continue to reorient.      Problem/Plan - 4:  ·  Problem: Depression, unspecified depression type.  Plan: Continue SSRI  Denies si and hi.      Problem/Plan - 5:  ·  Problem: Anxiety.  Plan: Continue SSRI  PRN lorazepam.      Problem/Plan - 6:  Problem: Essential hypertension. Plan: amLODIPine   Tablet 7.5 milliGRAM(s) Oral daily  up-titrate as needed.      86y f PMHx HTN, macular degeneration, depression, diverticulitis BIBA for AMS. Pt's daughter reports concern for AMS x2 days with patient becoming more confused at home. Patient currently continues to be somewhat confused, knowing she is in the hospital and who she is but not remembering why she is here. Patient complains of increased urinary frequency/urgency. Per ER records had c/o R frontal headache as well but currently denies. Denies any fevers or chills/nausea/vomiting/flank pain. Denies dysuria. No cough/myalgias.     # Acute encephalopathy. Metabolic.  Presumably from complicated UTI. . UTI less likely given below.   MRI w/o acute process.  EEG pending.   Neuro consulted. Input into case appreciated.      #: Acute cystitis without hematuria.  Plan: UA with < 5 WBCs but Ucx positive.   Can continue CTX for now.  Ucx noted. Keep IV for now.   # Delirium.  Plan: Carries diagnosis.   Not on Rx currently.   Continue to reorient.   #: Depression, unspecified depression type.  Plan: Continue SSRI  Denies si and hi.      Problem/Plan - 5:  ·  Problem: Anxiety.  Plan: Continue SSRI  PRN lorazepam.      Problem/Plan - 6:  Problem: Essential hypertension. Plan: amLODIPine   Tablet 7.5 milliGRAM(s) Oral daily  up-titrate as needed.   See progress note attached.

## 2021-02-07 NOTE — DISCHARGE NOTE PROVIDER - NSDCPNSUBOBJ_GEN_ALL_CORE
HOSPITALIST NOTE    Dr. Brien Hernández DO  Attending Physician  Division of Hospital Medicine  [facility]  Pager:  [phone]    SUBJECTIVE  no acute complaints.    REVIEW OF SYSTEMS  10 point review of systems negative except for above.     PAST MEDICAL & SURGICAL HISTORY:  Hyperlipidemia    OA (osteoarthritis)  knee and hip    MVP (mitral valve prolapse)    Macular degeneration  right eye    Diverticulitis    Depression    Anxiety    S/P left cataract extraction  right and left    Status post cholecystectomy        MEDICATIONS  (STANDING):  amLODIPine   Tablet 7.5 milliGRAM(s) Oral daily  ascorbic acid 500 milliGRAM(s) Oral daily  cefTRIAXone   IVPB 1000 milliGRAM(s) IV Intermittent every 24 hours  enoxaparin Injectable 40 milliGRAM(s) SubCutaneous daily  FLUoxetine 10 milliGRAM(s) Oral daily  melatonin 3 milliGRAM(s) Oral <User Schedule>  multivitamin 1 Tablet(s) Oral daily    MEDICATIONS  (PRN):  LORazepam     Tablet 0.5 milliGRAM(s) Oral at bedtime PRN insomnia/anxiety      Allergies    No Known Allergies    Intolerances        T(C): 36.6 (02-07-21 @ 05:39), Max: 36.6 (02-06-21 @ 13:06)  T(F): 97.8 (02-07-21 @ 05:39), Max: 97.9 (02-06-21 @ 13:06)  HR: 87 (02-07-21 @ 06:10) (87 - 93)  BP: 123/72 (02-07-21 @ 06:10) (123/72 - 171/82)  ABP: --  ABP(mean): --  RR: 18 (02-07-21 @ 05:39) (17 - 18)  SpO2: 95% (02-07-21 @ 05:39) (95% - 96%)    CONSTITUTIONAL: No acute distress.   HEENT:  Conjunctiva clear B/L.  Moist oral mucosa.   Cardiovascular: RRR with no murmurs. No JVD noted. No lower extremity edema B/L. Extremities are warm and well perfused. Radial pulses 2+ B/L. Dorsalis pedis pulses 2+ B/L.    Respiratory: Lungs CTAB. No wrr. No accessory muscle use.   Gastrointestinal:  Soft, nontender. Non-distended. Non-rigid. No CVA tenderness B/L.  MSK:  No joint swelling. No joint erythema B/L. No midline spinal tenderness.  Neurologic:  Alert and awake. Oriented x2. Moving all extremities. Following commands. Making eye contact. EOMI. PERRL. No nystagmus. Brachial and Patellar reflexes 2/4 b/l and equal. No clonus. Babinski down going. Finger to nose testing wnl. Deferred gait testing.   Skin:  No rashes noted. No skin erythema noted.   Psych:  Normal affect. Normal Mood.     LABS                        13.3   2.07  )-----------( 170      ( 07 Feb 2021 07:11 )             39.9     02-07    139  |  102  |  22  ----------------------------<  86  3.5   |  24  |  0.67    Ca    8.8      07 Feb 2021 07:11  Phos  3.5     02-06  Mg     2.0     02-07    TPro  7.4  /  Alb  4.0  /  TBili  0.6  /  DBili  x   /  AST  24  /  ALT  11  /  AlkPhos  47  02-06          ADDITIONAL STUDIES PERSONALLY REVIEWED    Our team discussed the case with all consults    All consultant contribution to care greatly appreciated.       Assessment and Plan:   · Assessment	  86y f PMHx HTN, macular degeneration, depression, diverticulitis BIBA for AMS. Pt's daughter reports concern for AMS x2 days with word finding difficulties and gait instability.        Problem/Plan - 1:  ·  Problem: Acute encephalopathy.  Plan: Metabolic.  Presumably from complicated UTI.  MRI w/o acute process.  EEG pending.   Neuro consulted. Input into case appreciated. -- family would like to leave AMA. Would not wait for neuro to speak with them.      Problem/Plan - 2:  ·  Problem: Acute cystitis without hematuria.  Plan: UA with < 5 WBCs but Ucx positive.   Can continue CTX for now.  Ucx noted. Keep IV for now. D/c on cefpodoxime 200 mg po bid for 7 more days. 10 day course.      Problem/Plan - 3:  ·  Problem: Delirium.  Plan: Carries diagnosis.   Not on Rx currently.   Continue to reorient.      Problem/Plan - 4:  ·  Problem: Depression, unspecified depression type.  Plan: Continue SSRI  Denies si and hi.      Problem/Plan - 5:  ·  Problem: Anxiety.  Plan: Continue SSRI  PRN lorazepam.      Problem/Plan - 6:  Problem: Essential hypertension. Plan: amLODIPine   Tablet 7.5 milliGRAM(s) Oral daily  up-titrate as needed.    Attending Attestation:   LILIANA noted -- patient reports hx of covid in April. Covid CM? No prior hx of heart issues. Trop peaked. No anginal equivalents on exam. Ext warm and well perfused. Vitals overall stable. TTE pending.   Suspect demand ischemia in setting of dehydration and uti.  Patient daughter requesting to d/c AMA. Risks of leaving explained including severe morbidity and even death. They verbalized understanding w/ teach back.   I advised close follow up with cardiology and neurology, both which she have.  Will need repeat labs, tte, and EEG outpt.   Can finish abx w/ PO cefpodoxime as above.   D/c plan explained at length    AMA D/c time spent: 50 minutes.

## 2021-02-08 ENCOUNTER — NON-APPOINTMENT (OUTPATIENT)
Age: 86
End: 2021-02-08

## 2021-02-09 LAB — COPPER SERPL-MCNC: 94 UG/DL — SIGNIFICANT CHANGE UP (ref 80–158)

## 2021-02-17 ENCOUNTER — APPOINTMENT (OUTPATIENT)
Dept: INTERNAL MEDICINE | Facility: CLINIC | Age: 86
End: 2021-02-17
Payer: MEDICARE

## 2021-02-17 VITALS
WEIGHT: 140 LBS | DIASTOLIC BLOOD PRESSURE: 76 MMHG | HEIGHT: 67 IN | BODY MASS INDEX: 21.97 KG/M2 | SYSTOLIC BLOOD PRESSURE: 140 MMHG | TEMPERATURE: 209.48 F | OXYGEN SATURATION: 98 % | HEART RATE: 81 BPM

## 2021-02-17 VITALS — SYSTOLIC BLOOD PRESSURE: 138 MMHG | DIASTOLIC BLOOD PRESSURE: 74 MMHG

## 2021-02-17 DIAGNOSIS — R68.89 OTHER GENERAL SYMPTOMS AND SIGNS: ICD-10-CM

## 2021-02-17 PROCEDURE — 99495 TRANSJ CARE MGMT MOD F2F 14D: CPT

## 2021-02-17 NOTE — PHYSICAL EXAM
[No Acute Distress] : no acute distress [Well Nourished] : well nourished [Well Developed] : well developed [Well-Appearing] : well-appearing [No Lymphadenopathy] : no lymphadenopathy [Supple] : supple [No Respiratory Distress] : no respiratory distress  [No Accessory Muscle Use] : no accessory muscle use [Clear to Auscultation] : lungs were clear to auscultation bilaterally [Normal Rate] : normal rate  [Regular Rhythm] : with a regular rhythm [Normal S1, S2] : normal S1 and S2 [No Edema] : there was no peripheral edema [de-identified] : mild redness between buttock folds superiorly

## 2021-02-17 NOTE — REVIEW OF SYSTEMS
[Fever] : no fever [Vision Problems] : no vision problems [Nasal Discharge] : no nasal discharge [Chest Pain] : no chest pain [Shortness Of Breath] : no shortness of breath [Abdominal Pain] : no abdominal pain

## 2021-02-17 NOTE — HISTORY OF PRESENT ILLNESS
[Post-hospitalization from ___ Hospital] : Post-hospitalization from [unfilled] Hospital [Admitted on: ___] : The patient was admitted on [unfilled] [Discharged on ___] : discharged on [unfilled] [Discharge Summary] : discharge summary [Discharge Med List] : discharge medication list [Patient Contacted By: ____] : and contacted by [unfilled] [FreeTextEntry2] : ISIS NELSON is an 87 yo woman with hypertension, depression/anxiety, vertebral fracture, macular degeneration, arthritis here for a HFU after hospitalization for mental status change, found to have UTI.  The patient was admitted in stable condition and treated with ceftriaxone, discharged on cefpodoxime.  She left the hospital AMA.  Neurology wanted the patient to remain in the hospital.  MRI brain did not show any acute findings. The patient sees a neurologist at [facility].  The patient is feeling well.\par \par Advised neuro and cardio fuv\par \par The patient was seen with her daughter Smiley today.  The patient is walking short distances with a cane.

## 2021-02-21 ENCOUNTER — NON-APPOINTMENT (OUTPATIENT)
Age: 86
End: 2021-02-21

## 2021-02-21 ENCOUNTER — TRANSCRIPTION ENCOUNTER (OUTPATIENT)
Age: 86
End: 2021-02-21

## 2021-02-25 ENCOUNTER — TRANSCRIPTION ENCOUNTER (OUTPATIENT)
Age: 86
End: 2021-02-25

## 2021-03-05 ENCOUNTER — APPOINTMENT (OUTPATIENT)
Dept: PULMONOLOGY | Facility: CLINIC | Age: 86
End: 2021-03-05

## 2021-03-09 NOTE — ED ADULT NURSE NOTE - CAS EDP DISCH TYPE
Sven (spouse) called in, patient is having a CT scan tomorrow and patient needs something to help with the pain for her to lay down with this CT scan. Last time she was given a pain pill but they were not strong enough.       Pharmacy:  [facility].    Home

## 2021-04-12 ENCOUNTER — RX RENEWAL (OUTPATIENT)
Age: 86
End: 2021-04-12

## 2021-05-24 ENCOUNTER — APPOINTMENT (OUTPATIENT)
Dept: INTERNAL MEDICINE | Facility: CLINIC | Age: 86
End: 2021-05-24
Payer: MEDICARE

## 2021-05-24 ENCOUNTER — LABORATORY RESULT (OUTPATIENT)
Age: 86
End: 2021-05-24

## 2021-05-24 VITALS
OXYGEN SATURATION: 98 % | BODY MASS INDEX: 22.91 KG/M2 | HEIGHT: 67 IN | SYSTOLIC BLOOD PRESSURE: 136 MMHG | TEMPERATURE: 97.7 F | DIASTOLIC BLOOD PRESSURE: 84 MMHG | WEIGHT: 146 LBS | HEART RATE: 83 BPM

## 2021-05-24 PROCEDURE — 99214 OFFICE O/P EST MOD 30 MIN: CPT | Mod: 25

## 2021-05-24 PROCEDURE — 36415 COLL VENOUS BLD VENIPUNCTURE: CPT

## 2021-05-24 RX ORDER — ERGOCALCIFEROL 1.25 MG/1
1.25 MG CAPSULE ORAL
Qty: 12 | Refills: 0 | Status: DISCONTINUED | COMMUNITY
Start: 2021-01-11 | End: 2021-05-24

## 2021-05-24 RX ORDER — LEVOFLOXACIN 250 MG/1
250 TABLET, FILM COATED ORAL DAILY
Qty: 7 | Refills: 0 | Status: DISCONTINUED | COMMUNITY
Start: 2021-02-21 | End: 2021-05-24

## 2021-05-24 RX ORDER — PANTOPRAZOLE 40 MG/1
40 TABLET, DELAYED RELEASE ORAL
Refills: 0 | Status: DISCONTINUED | COMMUNITY
End: 2021-05-24

## 2021-05-24 RX ORDER — HYDROXYZINE HYDROCHLORIDE 10 MG/1
10 TABLET ORAL
Refills: 0 | Status: DISCONTINUED | COMMUNITY
End: 2021-05-24

## 2021-05-24 RX ORDER — PHENAZOPYRIDINE 100 MG/1
100 TABLET, FILM COATED ORAL
Qty: 6 | Refills: 0 | Status: DISCONTINUED | COMMUNITY
Start: 2021-02-21 | End: 2021-05-24

## 2021-05-24 NOTE — PHYSICAL EXAM
[No Acute Distress] : no acute distress [Well Nourished] : well nourished [Well Developed] : well developed [Well-Appearing] : well-appearing [No Lymphadenopathy] : no lymphadenopathy [Supple] : supple [No Respiratory Distress] : no respiratory distress  [No Accessory Muscle Use] : no accessory muscle use [Clear to Auscultation] : lungs were clear to auscultation bilaterally [Normal Rate] : normal rate  [Regular Rhythm] : with a regular rhythm [Normal S1, S2] : normal S1 and S2 [No Edema] : there was no peripheral edema [de-identified] : walks with cane

## 2021-05-24 NOTE — HISTORY OF PRESENT ILLNESS
[FreeTextEntry1] : BP check [de-identified] : ISIS NELSON is an 85 yo woman with hypertension, depression/anxiety, vertebral fracture, macular degeneration, arthritis here for a bp checjk.  She has been well overall.  Reports some anxiety and depressive symptoms.  Did not do well with lexapro and zoloft in the past.  Stable on fluoxetine.   Consider talk therapy.  Takes ativan less than twice weekly prn. Requests refill.  Istop was reviewed today.\par \par Had recent hospitalization for UTI and confusion.\par \par Has seen cardiology Dr Daigle in the past.  Advised fuv for history of abnormal ekg and MVP\par \par The patient recovered from covid 19 in April 2020.  She was not hospitalized.  The patient has macular degeneration, has injections in eyes every 6 weeks.  Follows up regularly with Dr Juarez.  Has a history of vertebral fracture, no trauma, had kyphoplasty. Has seen Dr Chong and injecctions from Dr XENIA Dailey [facility].  Has OA of knees and sees Dr Cody\par \par The patient is  with one daughter and one grandchild.  She is retired from the insurance.  She lives with her daughter Smiley and Smiley's .  She is seen with Smiley today.  The patient can walk short distances with a cane.  Limited due to back and knee pain.

## 2021-05-26 LAB
25(OH)D3 SERPL-MCNC: 17.5 NG/ML
ALBUMIN SERPL ELPH-MCNC: 4.5 G/DL
ALP BLD-CCNC: 46 U/L
ALT SERPL-CCNC: 8 U/L
ANION GAP SERPL CALC-SCNC: 19 MMOL/L
APPEARANCE: CLEAR
AST SERPL-CCNC: 19 U/L
BASOPHILS # BLD AUTO: 0.04 K/UL
BASOPHILS NFR BLD AUTO: 1.1 %
BILIRUB SERPL-MCNC: 0.6 MG/DL
BILIRUBIN URINE: NEGATIVE
BLOOD URINE: NEGATIVE
BUN SERPL-MCNC: 24 MG/DL
CALCIUM SERPL-MCNC: 9.5 MG/DL
CHLORIDE SERPL-SCNC: 106 MMOL/L
CO2 SERPL-SCNC: 16 MMOL/L
COLOR: YELLOW
CREAT SERPL-MCNC: 0.71 MG/DL
EOSINOPHIL # BLD AUTO: 0.13 K/UL
EOSINOPHIL NFR BLD AUTO: 3.5 %
ESTIMATED AVERAGE GLUCOSE: 100 MG/DL
GLUCOSE QUALITATIVE U: NEGATIVE
GLUCOSE SERPL-MCNC: 79 MG/DL
HBA1C MFR BLD HPLC: 5.1 %
HCT VFR BLD CALC: 39.9 %
HGB BLD-MCNC: 12.5 G/DL
IMM GRANULOCYTES NFR BLD AUTO: 1.4 %
KETONES URINE: NEGATIVE
LEUKOCYTE ESTERASE URINE: NEGATIVE
LYMPHOCYTES # BLD AUTO: 0.91 K/UL
LYMPHOCYTES NFR BLD AUTO: 24.7 %
MAN DIFF?: NORMAL
MCHC RBC-ENTMCNC: 28.5 PG
MCHC RBC-ENTMCNC: 31.3 GM/DL
MCV RBC AUTO: 91.1 FL
MONOCYTES # BLD AUTO: 0.59 K/UL
MONOCYTES NFR BLD AUTO: 16 %
NEUTROPHILS # BLD AUTO: 1.96 K/UL
NEUTROPHILS NFR BLD AUTO: 53.3 %
NITRITE URINE: NEGATIVE
PH URINE: 6
PLATELET # BLD AUTO: NORMAL K/UL
POTASSIUM SERPL-SCNC: 4.8 MMOL/L
PROT SERPL-MCNC: 7.4 G/DL
PROTEIN URINE: NORMAL
RBC # BLD: 4.38 M/UL
RBC # FLD: 14.5 %
SODIUM SERPL-SCNC: 141 MMOL/L
SPECIFIC GRAVITY URINE: 1.03
T4 FREE SERPL-MCNC: 1.2 NG/DL
TSH SERPL-ACNC: 2.81 UIU/ML
UROBILINOGEN URINE: NORMAL
VIT B12 SERPL-MCNC: 833 PG/ML
WBC # FLD AUTO: 3.68 K/UL

## 2021-05-27 LAB — BACTERIA UR CULT: ABNORMAL

## 2021-08-24 ENCOUNTER — RX RENEWAL (OUTPATIENT)
Age: 86
End: 2021-08-24

## 2021-09-29 ENCOUNTER — RESULT CHARGE (OUTPATIENT)
Age: 86
End: 2021-09-29

## 2021-09-29 LAB
BILIRUB UR QL STRIP: NORMAL
CLARITY UR: NORMAL
COLLECTION METHOD: NORMAL
GLUCOSE UR-MCNC: NORMAL
HCG UR QL: 0.2 EU/DL
HGB UR QL STRIP.AUTO: NORMAL
KETONES UR-MCNC: 15
LEUKOCYTE ESTERASE UR QL STRIP: NORMAL
NITRITE UR QL STRIP: NORMAL
PH UR STRIP: 6
PROT UR STRIP-MCNC: NORMAL
SP GR UR STRIP: 1.01

## 2021-09-30 ENCOUNTER — APPOINTMENT (OUTPATIENT)
Dept: INTERNAL MEDICINE | Facility: CLINIC | Age: 86
End: 2021-09-30
Payer: MEDICARE

## 2021-09-30 VITALS
BODY MASS INDEX: 21.93 KG/M2 | WEIGHT: 140 LBS | SYSTOLIC BLOOD PRESSURE: 140 MMHG | HEART RATE: 84 BPM | OXYGEN SATURATION: 98 % | TEMPERATURE: 97.9 F | DIASTOLIC BLOOD PRESSURE: 70 MMHG

## 2021-09-30 DIAGNOSIS — E55.9 VITAMIN D DEFICIENCY, UNSPECIFIED: ICD-10-CM

## 2021-09-30 DIAGNOSIS — M54.9 DORSALGIA, UNSPECIFIED: ICD-10-CM

## 2021-09-30 DIAGNOSIS — R39.9 UNSPECIFIED SYMPTOMS AND SIGNS INVOLVING THE GENITOURINARY SYSTEM: ICD-10-CM

## 2021-09-30 PROCEDURE — 99214 OFFICE O/P EST MOD 30 MIN: CPT | Mod: 25

## 2021-09-30 PROCEDURE — 36415 COLL VENOUS BLD VENIPUNCTURE: CPT

## 2021-09-30 RX ORDER — ERGOCALCIFEROL 1.25 MG/1
1.25 MG CAPSULE ORAL
Qty: 12 | Refills: 0 | Status: DISCONTINUED | COMMUNITY
Start: 2021-05-26 | End: 2021-09-30

## 2021-09-30 RX ORDER — FLUOXETINE HYDROCHLORIDE 10 MG/1
10 CAPSULE ORAL
Qty: 90 | Refills: 0 | Status: DISCONTINUED | COMMUNITY
Start: 2021-01-11 | End: 2021-09-30

## 2021-09-30 NOTE — PHYSICAL EXAM
[No Acute Distress] : no acute distress [Well Nourished] : well nourished [Well Developed] : well developed [Well-Appearing] : well-appearing [No Lymphadenopathy] : no lymphadenopathy [Supple] : supple [No Respiratory Distress] : no respiratory distress  [No Accessory Muscle Use] : no accessory muscle use [Clear to Auscultation] : lungs were clear to auscultation bilaterally [Normal Rate] : normal rate  [Regular Rhythm] : with a regular rhythm [Normal S1, S2] : normal S1 and S2 [No Edema] : there was no peripheral edema [de-identified] : walks with cane

## 2021-09-30 NOTE — HISTORY OF PRESENT ILLNESS
[FreeTextEntry1] : BP check [de-identified] : ISIS NELSON is an 88 yo woman with hypertension, depression/anxiety, vertebral fracture, macular degeneration, arthritis here for a bp checjk.  She has been well overall.  Reports some anxiety and depressive symptoms.  Decided to stop fluoxetine.  Will consider cymbalta. Takes ativan less than twice weekly prn. Requests refill.  Istop was reviewed today.\par \par Now on nitrofurantoin for UTI.  Requests cipro to be used for intermittent stomach upset symptoms. None currently\par \par Has seen cardiology Dr Daigle in the past.  Advised fuv for history of abnormal ekg and MVP\par \par The patient recovered from covid 19 in April 2020.  She was not hospitalized.  The patient has macular degeneration, has injections in eyes every 6 weeks.  Follows up regularly with Dr Juarez.  Has a history of vertebral fracture, no trauma, had kyphoplasty. Has seen Dr Chong and [facility] seg[facility].  Has OA of knees and sees Dr Cody\par \par The patient is  with one daughter and one grandchild.  She is retired from the insurance.  She lives with her daughter Smiley and Smiley's .  She is seen with Smiley today.  The patient can walk short distances with a cane.  Limited due to back and knee pain.

## 2021-09-30 NOTE — ASSESSMENT
[FreeTextEntry1] : Labs today\par HIgh dose flu recommended (pt will return for shot)\par ativan prn sparingly\par continue current meds\par CPX in 4 months

## 2021-10-06 LAB
25(OH)D3 SERPL-MCNC: 21.9 NG/ML
ALBUMIN SERPL ELPH-MCNC: 4.7 G/DL
ALP BLD-CCNC: 56 U/L
ALT SERPL-CCNC: 11 U/L
ANION GAP SERPL CALC-SCNC: 12 MMOL/L
AST SERPL-CCNC: 19 U/L
BACTERIA UR CULT: ABNORMAL
BASOPHILS # BLD AUTO: 0.02 K/UL
BASOPHILS NFR BLD AUTO: 0.3 %
BILIRUB SERPL-MCNC: 0.6 MG/DL
BUN SERPL-MCNC: 22 MG/DL
CALCIUM SERPL-MCNC: 9.6 MG/DL
CHLORIDE SERPL-SCNC: 102 MMOL/L
CO2 SERPL-SCNC: 27 MMOL/L
CREAT SERPL-MCNC: 0.6 MG/DL
EOSINOPHIL # BLD AUTO: 0.17 K/UL
EOSINOPHIL NFR BLD AUTO: 2.9 %
ESTIMATED AVERAGE GLUCOSE: 103 MG/DL
GLUCOSE SERPL-MCNC: 102 MG/DL
HBA1C MFR BLD HPLC: 5.2 %
HCT VFR BLD CALC: 39.5 %
HGB BLD-MCNC: 12.9 G/DL
IMM GRANULOCYTES NFR BLD AUTO: 0.8 %
LYMPHOCYTES # BLD AUTO: 0.56 K/UL
LYMPHOCYTES NFR BLD AUTO: 9.5 %
MAN DIFF?: NORMAL
MCHC RBC-ENTMCNC: 29.3 PG
MCHC RBC-ENTMCNC: 32.7 GM/DL
MCV RBC AUTO: 89.6 FL
MONOCYTES # BLD AUTO: 0.42 K/UL
MONOCYTES NFR BLD AUTO: 7.1 %
NEUTROPHILS # BLD AUTO: 4.69 K/UL
NEUTROPHILS NFR BLD AUTO: 79.4 %
PLATELET # BLD AUTO: 195 K/UL
POTASSIUM SERPL-SCNC: 4.9 MMOL/L
PROT SERPL-MCNC: 7.3 G/DL
RBC # BLD: 4.41 M/UL
RBC # FLD: 13.9 %
SODIUM SERPL-SCNC: 140 MMOL/L
VIT B12 SERPL-MCNC: 1014 PG/ML
WBC # FLD AUTO: 5.91 K/UL

## 2021-10-21 ENCOUNTER — LABORATORY RESULT (OUTPATIENT)
Age: 86
End: 2021-10-21

## 2021-10-22 LAB
APPEARANCE: CLEAR
BILIRUBIN URINE: NEGATIVE
BLOOD URINE: NEGATIVE
COLOR: ABNORMAL
GLUCOSE QUALITATIVE U: NEGATIVE
KETONES URINE: NEGATIVE
LEUKOCYTE ESTERASE URINE: NEGATIVE
NITRITE URINE: POSITIVE
PH URINE: 6.5
PROTEIN URINE: NEGATIVE
SPECIFIC GRAVITY URINE: 1.01
UROBILINOGEN URINE: NORMAL

## 2021-10-26 LAB — BACTERIA UR CULT: NORMAL

## 2021-11-22 LAB
APPEARANCE: CLEAR
BACTERIA UR CULT: NORMAL
BILIRUBIN URINE: NEGATIVE
BLOOD URINE: NEGATIVE
COLOR: NORMAL
GLUCOSE QUALITATIVE U: NEGATIVE
KETONES URINE: NEGATIVE
LEUKOCYTE ESTERASE URINE: NEGATIVE
NITRITE URINE: NEGATIVE
PH URINE: 6
PROTEIN URINE: NEGATIVE
SPECIFIC GRAVITY URINE: 1.02
UROBILINOGEN URINE: NORMAL

## 2021-12-06 DIAGNOSIS — K57.32 DIVERTICULITIS OF LARGE INTESTINE W/OUT PERFORATION OR ABSCESS W/OUT BLEEDING: ICD-10-CM

## 2021-12-09 ENCOUNTER — APPOINTMENT (OUTPATIENT)
Dept: INTERNAL MEDICINE | Facility: CLINIC | Age: 86
End: 2021-12-09

## 2022-01-01 ENCOUNTER — APPOINTMENT (OUTPATIENT)
Dept: INTERNAL MEDICINE | Facility: CLINIC | Age: 87
End: 2022-01-01

## 2022-01-01 ENCOUNTER — RX RENEWAL (OUTPATIENT)
Age: 87
End: 2022-01-01

## 2022-01-01 ENCOUNTER — NON-APPOINTMENT (OUTPATIENT)
Age: 87
End: 2022-01-01

## 2022-01-01 ENCOUNTER — LABORATORY RESULT (OUTPATIENT)
Age: 87
End: 2022-01-01

## 2022-01-01 ENCOUNTER — OUTPATIENT (OUTPATIENT)
Dept: OUTPATIENT SERVICES | Facility: HOSPITAL | Age: 87
LOS: 1 days | Discharge: HOME | End: 2022-01-01

## 2022-01-01 VITALS
HEART RATE: 75 BPM | HEIGHT: 64 IN | OXYGEN SATURATION: 99 % | BODY MASS INDEX: 25.61 KG/M2 | WEIGHT: 150 LBS | SYSTOLIC BLOOD PRESSURE: 140 MMHG | DIASTOLIC BLOOD PRESSURE: 72 MMHG | TEMPERATURE: 97.8 F

## 2022-01-01 DIAGNOSIS — Z98.42 CATARACT EXTRACTION STATUS, LEFT EYE: Chronic | ICD-10-CM

## 2022-01-01 DIAGNOSIS — I10 ESSENTIAL (PRIMARY) HYPERTENSION: ICD-10-CM

## 2022-01-01 DIAGNOSIS — Z90.49 ACQUIRED ABSENCE OF OTHER SPECIFIED PARTS OF DIGESTIVE TRACT: Chronic | ICD-10-CM

## 2022-01-01 DIAGNOSIS — I26.99 OTHER PULMONARY EMBOLISM WITHOUT ACUTE COR PULMONALE: ICD-10-CM

## 2022-01-01 LAB
ALBUMIN SERPL ELPH-MCNC: 4.5 G/DL
ALP BLD-CCNC: 62 U/L
ALT SERPL-CCNC: 21 U/L
ANION GAP SERPL CALC-SCNC: 12 MMOL/L
AST SERPL-CCNC: 16 U/L
BASOPHILS # BLD AUTO: 0 K/UL
BASOPHILS NFR BLD AUTO: 0 %
BILIRUB SERPL-MCNC: 0.5 MG/DL
BUN SERPL-MCNC: 18 MG/DL
CALCIUM SERPL-MCNC: 9.2 MG/DL
CHLORIDE SERPL-SCNC: 100 MMOL/L
CO2 SERPL-SCNC: 28 MMOL/L
CREAT SERPL-MCNC: 0.63 MG/DL
EGFR: 85 ML/MIN/1.73M2
EOSINOPHIL # BLD AUTO: 0 K/UL
EOSINOPHIL NFR BLD AUTO: 0 %
GLUCOSE SERPL-MCNC: 89 MG/DL
HCT VFR BLD CALC: 40.7 %
HGB BLD-MCNC: 13.1 G/DL
LYMPHOCYTES # BLD AUTO: 1 K/UL
LYMPHOCYTES NFR BLD AUTO: 8.7 %
MAN DIFF?: NORMAL
MCHC RBC-ENTMCNC: 28.1 PG
MCHC RBC-ENTMCNC: 32.2 GM/DL
MCV RBC AUTO: 87.2 FL
MONOCYTES # BLD AUTO: 1 K/UL
MONOCYTES NFR BLD AUTO: 8.7 %
NEUTROPHILS # BLD AUTO: 9.47 K/UL
NEUTROPHILS NFR BLD AUTO: 82.6 %
PLATELET # BLD AUTO: 402 K/UL
POTASSIUM SERPL-SCNC: 4.2 MMOL/L
PROT SERPL-MCNC: 6.7 G/DL
RBC # BLD: 4.67 M/UL
RBC # FLD: 14.4 %
SODIUM SERPL-SCNC: 139 MMOL/L
WBC # FLD AUTO: 11.47 K/UL

## 2022-01-01 PROCEDURE — 99442: CPT | Mod: 95

## 2022-01-01 PROCEDURE — 99495 TRANSJ CARE MGMT MOD F2F 14D: CPT | Mod: 25

## 2022-01-01 PROCEDURE — 36415 COLL VENOUS BLD VENIPUNCTURE: CPT

## 2022-01-01 RX ORDER — DICYCLOMINE HYDROCHLORIDE 10 MG/1
10 CAPSULE ORAL
Qty: 20 | Refills: 0 | Status: DISCONTINUED | COMMUNITY
Start: 2021-11-26 | End: 2022-01-01

## 2022-01-01 RX ORDER — CIPROFLOXACIN HYDROCHLORIDE 250 MG/1
250 TABLET, FILM COATED ORAL
Qty: 20 | Refills: 0 | Status: DISCONTINUED | COMMUNITY
Start: 2022-01-01 | End: 2022-01-01

## 2022-01-01 RX ORDER — ERGOCALCIFEROL 1.25 MG/1
1.25 MG CAPSULE, LIQUID FILLED ORAL
Qty: 12 | Refills: 0 | Status: DISCONTINUED | COMMUNITY
Start: 2022-04-20 | End: 2022-01-01

## 2022-01-01 RX ORDER — GABAPENTIN 100 MG/1
100 CAPSULE ORAL
Qty: 90 | Refills: 1 | Status: DISCONTINUED | COMMUNITY
Start: 2021-05-24 | End: 2022-01-01

## 2022-01-01 RX ORDER — NIRMATRELVIR AND RITONAVIR 300-100 MG
20 X 150 MG & KIT ORAL
Qty: 1 | Refills: 0 | Status: DISCONTINUED | COMMUNITY
Start: 2022-01-01 | End: 2022-01-01

## 2022-01-01 RX ORDER — HALOBETASOL PROPIONATE 0.5 MG/G
0.05 OINTMENT TOPICAL DAILY
Qty: 2 | Refills: 3 | Status: DISCONTINUED | COMMUNITY
End: 2022-01-01

## 2022-01-01 RX ORDER — ESCITALOPRAM OXALATE 5 MG/1
5 TABLET ORAL DAILY
Qty: 60 | Refills: 1 | Status: DISCONTINUED | COMMUNITY
Start: 2021-12-17 | End: 2022-01-01

## 2022-01-01 RX ORDER — ERGOCALCIFEROL 1.25 MG/1
1.25 MG CAPSULE ORAL
Qty: 12 | Refills: 0 | Status: DISCONTINUED | COMMUNITY
Start: 2022-02-03 | End: 2022-01-01

## 2022-01-27 RX ORDER — METRONIDAZOLE 500 MG/1
500 TABLET ORAL TWICE DAILY
Refills: 0 | Status: DISCONTINUED | COMMUNITY
Start: 2021-12-06 | End: 2022-01-27

## 2022-01-27 RX ORDER — NITROFURANTOIN (MONOHYDRATE/MACROCRYSTALS) 25; 75 MG/1; MG/1
100 CAPSULE ORAL TWICE DAILY
Qty: 10 | Refills: 0 | Status: DISCONTINUED | COMMUNITY
Start: 2021-05-26 | End: 2022-01-27

## 2022-01-27 RX ORDER — NITROFURANTOIN (MONOHYDRATE/MACROCRYSTALS) 25; 75 MG/1; MG/1
100 CAPSULE ORAL
Qty: 14 | Refills: 0 | Status: DISCONTINUED | COMMUNITY
Start: 2021-11-19 | End: 2022-01-27

## 2022-01-27 RX ORDER — CEFUROXIME AXETIL 500 MG/1
500 TABLET ORAL
Refills: 0 | Status: DISCONTINUED | COMMUNITY
Start: 2021-12-06 | End: 2022-01-27

## 2022-01-27 RX ORDER — CIPROFLOXACIN HYDROCHLORIDE 250 MG/1
250 TABLET, FILM COATED ORAL
Qty: 20 | Refills: 0 | Status: DISCONTINUED | COMMUNITY
Start: 2021-09-30 | End: 2022-01-27

## 2022-02-02 ENCOUNTER — APPOINTMENT (OUTPATIENT)
Dept: INTERNAL MEDICINE | Facility: CLINIC | Age: 87
End: 2022-02-02
Payer: MEDICARE

## 2022-02-02 ENCOUNTER — LABORATORY RESULT (OUTPATIENT)
Age: 87
End: 2022-02-02

## 2022-02-02 VITALS
DIASTOLIC BLOOD PRESSURE: 72 MMHG | OXYGEN SATURATION: 98 % | HEIGHT: 67 IN | WEIGHT: 134 LBS | SYSTOLIC BLOOD PRESSURE: 130 MMHG | HEART RATE: 84 BPM | TEMPERATURE: 97.8 F | BODY MASS INDEX: 21.03 KG/M2

## 2022-02-02 DIAGNOSIS — Z23 ENCOUNTER FOR IMMUNIZATION: ICD-10-CM

## 2022-02-02 PROCEDURE — 99214 OFFICE O/P EST MOD 30 MIN: CPT | Mod: 25

## 2022-02-02 PROCEDURE — 90662 IIV NO PRSV INCREASED AG IM: CPT

## 2022-02-02 PROCEDURE — G0008: CPT

## 2022-02-02 PROCEDURE — 36415 COLL VENOUS BLD VENIPUNCTURE: CPT

## 2022-02-02 NOTE — PHYSICAL EXAM
[No Acute Distress] : no acute distress [Well Nourished] : well nourished [Well Developed] : well developed [Well-Appearing] : well-appearing [No Lymphadenopathy] : no lymphadenopathy [Supple] : supple [No Respiratory Distress] : no respiratory distress  [No Accessory Muscle Use] : no accessory muscle use [Clear to Auscultation] : lungs were clear to auscultation bilaterally [Normal Rate] : normal rate  [Regular Rhythm] : with a regular rhythm [Normal S1, S2] : normal S1 and S2 [No Edema] : there was no peripheral edema [de-identified] : walks with cane

## 2022-02-02 NOTE — HISTORY OF PRESENT ILLNESS
[FreeTextEntry1] : BP check [de-identified] : ISIS NELSON is an 88 yo woman with hypertension, depression/anxiety, vertebral fracture, macular degeneration, arthritis here for a bp check.  She has been well overall. Overall anxiety and depressive symptoms improved on lexapro. Requests increase in lexapro 10 mg daily.  Side effects discussed.  Takes ativan less than twice weekly prn. Requests refill.  Istop was reviewed today.\par \par The patient reports that she was scheduled for a procedure for back pain and it was cancelled due to elevated pulse.  Pt's pulse in normal today.  Pt has been on metoprolol xl 25 mg daily in the past.  Pt has tolerated in the past.  Advised 1/2 tab at bedtime.  Pt's daughter will monitor bp and pulse\par \par no urinary symptoms currently\par \par Has seen cardiology Dr Daigle in the past.  Will be seeing dr stephen goldberg in early march\par \par The patient recovered from covid 19 in April 2020.  She was not hospitalized.  The patient has macular degeneration, has injections in eyes every 6 weeks.  Follows up regularly with Dr Juarez.  Has a history of vertebral fracture, no trauma, had kyphoplasty. Has seen Dr Chong and [facility].  Has OA of knees and sees Dr Cody\par \par The patient is  with one daughter and one grandchild.  She is retired from the insurance.  She lives with her daughter Smiley and Smiley's .  She is seen with Smiley today.  The patient can walk short distances with a cane.  Limited due to back and knee pain.

## 2022-02-09 LAB
25(OH)D3 SERPL-MCNC: 13.1 NG/ML
ALBUMIN SERPL ELPH-MCNC: 4.6 G/DL
ALP BLD-CCNC: 54 U/L
ALT SERPL-CCNC: 8 U/L
ANION GAP SERPL CALC-SCNC: 12 MMOL/L
APPEARANCE: CLEAR
AST SERPL-CCNC: 17 U/L
BACTERIA UR CULT: NORMAL
BASOPHILS # BLD AUTO: 0.02 K/UL
BASOPHILS NFR BLD AUTO: 0.5 %
BILIRUB SERPL-MCNC: 0.4 MG/DL
BILIRUBIN URINE: NEGATIVE
BLOOD URINE: NEGATIVE
BUN SERPL-MCNC: 22 MG/DL
CALCIUM SERPL-MCNC: 9.4 MG/DL
CHLORIDE SERPL-SCNC: 104 MMOL/L
CO2 SERPL-SCNC: 25 MMOL/L
COLOR: YELLOW
CREAT SERPL-MCNC: 0.57 MG/DL
EOSINOPHIL # BLD AUTO: 0.11 K/UL
EOSINOPHIL NFR BLD AUTO: 2.8 %
ESTIMATED AVERAGE GLUCOSE: 97 MG/DL
GLUCOSE QUALITATIVE U: NEGATIVE
GLUCOSE SERPL-MCNC: 82 MG/DL
HBA1C MFR BLD HPLC: 5 %
HCT VFR BLD CALC: 38.8 %
HGB BLD-MCNC: 12 G/DL
IMM GRANULOCYTES NFR BLD AUTO: 0.3 %
KETONES URINE: NEGATIVE
LEUKOCYTE ESTERASE URINE: NEGATIVE
LYMPHOCYTES # BLD AUTO: 1.02 K/UL
LYMPHOCYTES NFR BLD AUTO: 25.7 %
MAN DIFF?: NORMAL
MCHC RBC-ENTMCNC: 28.6 PG
MCHC RBC-ENTMCNC: 30.9 GM/DL
MCV RBC AUTO: 92.6 FL
MONOCYTES # BLD AUTO: 0.7 K/UL
MONOCYTES NFR BLD AUTO: 17.6 %
NEUTROPHILS # BLD AUTO: 2.11 K/UL
NEUTROPHILS NFR BLD AUTO: 53.1 %
NITRITE URINE: NEGATIVE
PH URINE: 6
PLATELET # BLD AUTO: 161 K/UL
POTASSIUM SERPL-SCNC: 4.3 MMOL/L
PROT SERPL-MCNC: 6.7 G/DL
PROTEIN URINE: NORMAL
RBC # BLD: 4.19 M/UL
RBC # FLD: 13.9 %
SODIUM SERPL-SCNC: 140 MMOL/L
SPECIFIC GRAVITY URINE: 1.03
T4 FREE SERPL-MCNC: 1.1 NG/DL
TSH SERPL-ACNC: 2.72 UIU/ML
UROBILINOGEN URINE: NORMAL
VIT B12 SERPL-MCNC: 888 PG/ML
WBC # FLD AUTO: 3.97 K/UL

## 2022-04-04 ENCOUNTER — RX RENEWAL (OUTPATIENT)
Age: 87
End: 2022-04-04

## 2022-04-20 ENCOUNTER — RX RENEWAL (OUTPATIENT)
Age: 87
End: 2022-04-20

## 2022-10-05 NOTE — ED PROVIDER NOTE - CROS ED [facility] ALL NEG
[Normal Growth] : growth [Normal Development] : development [None] : No known medical problems [No Elimination Concerns] : elimination [No Feeding Concerns] : feeding [No Skin Concerns] : skin [Normal Sleep Pattern] : sleep [No Medications] : ~He/She~ is not on any medications [Patient] : patient negative...

## 2022-12-02 PROBLEM — I10 HYPERTENSION, ESSENTIAL: Status: ACTIVE | Noted: 2021-01-05

## 2022-12-02 NOTE — PHYSICAL EXAM
[No Acute Distress] : no acute distress [Well Nourished] : well nourished [Well Developed] : well developed [Well-Appearing] : well-appearing [No Lymphadenopathy] : no lymphadenopathy [Supple] : supple [No Respiratory Distress] : no respiratory distress  [No Accessory Muscle Use] : no accessory muscle use [Clear to Auscultation] : lungs were clear to auscultation bilaterally [Normal Rate] : normal rate  [Regular Rhythm] : with a regular rhythm [Normal S1, S2] : normal S1 and S2 [Alert and Oriented x3] : oriented to person, place, and time [de-identified] : mild waldemar ankle edema, L > R [de-identified] : walks with cane

## 2022-12-02 NOTE — HISTORY OF PRESENT ILLNESS
[Post-hospitalization from ___ Hospital] : Post-hospitalization from [unfilled] Hospital [Admitted on: ___] : The patient was admitted on [unfilled] [Discharged on ___] : discharged on [unfilled] [Patient Contacted By: ____] : and contacted by [unfilled] [FreeTextEntry2] : ISIS NELSON is an 86 yo woman with hypertension, depression/anxiety, vertebral fracture, macular degeneration, arthritis, pulmonary embolus now on xarelto here for a bp check and HFU. The patient had covid 19 in late october, she responded well to paxlovid. She developed sob, chest pain and leg swelling 2 -3 weeks later.  Seen at [facility] and diagnosed with PE.  Doppler u/s were negative for DVT.  Kenny ankle swelling thought to be due to right heart strain.  PT is feeling better now. Will be seeing pulm dr holcomb.  No chest pain, no sob.  She has been well overall. Overall anxiety and depressive symptoms stable and improved on lexapro.Takes ativan less than twice weekly prn. \par \par Has seen cardiology dr stephen goldberg\par \par The patient is  with one daughter and one grandchild. She is retired from the insurance. She lives with her daughter Smiley and Smiley's . She is seen with Smiley today. The patient can walk short distances with a cane. Limited due to back and knee pain. \par  \par

## 2022-12-02 NOTE — ASSESSMENT
[FreeTextEntry1] : continue current meds\par Labs drawn in office today\par CPX in 3 months or sooner if necessary\par

## 2023-01-01 ENCOUNTER — TRANSCRIPTION ENCOUNTER (OUTPATIENT)
Age: 88
End: 2023-01-01

## 2023-01-01 ENCOUNTER — RX RENEWAL (OUTPATIENT)
Age: 88
End: 2023-01-01

## 2023-01-01 ENCOUNTER — INPATIENT (INPATIENT)
Facility: HOSPITAL | Age: 88
LOS: 7 days | Discharge: NURSING, REHAB OR LONG-TERM CARE | DRG: 521 | End: 2023-01-18
Attending: STUDENT IN AN ORGANIZED HEALTH CARE EDUCATION/TRAINING PROGRAM | Admitting: HOSPITALIST
Payer: MEDICARE

## 2023-01-01 ENCOUNTER — NON-APPOINTMENT (OUTPATIENT)
Age: 88
End: 2023-01-01

## 2023-01-01 ENCOUNTER — APPOINTMENT (OUTPATIENT)
Dept: HEMATOLOGY ONCOLOGY | Facility: CLINIC | Age: 88
End: 2023-01-01

## 2023-01-01 ENCOUNTER — APPOINTMENT (OUTPATIENT)
Dept: INTERNAL MEDICINE | Facility: CLINIC | Age: 88
End: 2023-01-01
Payer: MEDICARE

## 2023-01-01 ENCOUNTER — INPATIENT (INPATIENT)
Facility: HOSPITAL | Age: 88
LOS: 6 days | Discharge: HOSPICE | DRG: 264 | End: 2023-05-10
Attending: HOSPITALIST | Admitting: STUDENT IN AN ORGANIZED HEALTH CARE EDUCATION/TRAINING PROGRAM
Payer: MEDICARE

## 2023-01-01 ENCOUNTER — APPOINTMENT (OUTPATIENT)
Dept: HEMATOLOGY ONCOLOGY | Facility: CLINIC | Age: 88
End: 2023-01-01
Payer: MEDICARE

## 2023-01-01 ENCOUNTER — RESULT REVIEW (OUTPATIENT)
Age: 88
End: 2023-01-01

## 2023-01-01 ENCOUNTER — EMERGENCY (EMERGENCY)
Facility: HOSPITAL | Age: 88
LOS: 1 days | Discharge: HOME | End: 2023-01-01
Attending: EMERGENCY MEDICINE
Payer: OTHER MISCELLANEOUS

## 2023-01-01 ENCOUNTER — LABORATORY RESULT (OUTPATIENT)
Age: 88
End: 2023-01-01

## 2023-01-01 ENCOUNTER — APPOINTMENT (OUTPATIENT)
Dept: DERMATOLOGY | Facility: CLINIC | Age: 88
End: 2023-01-01

## 2023-01-01 VITALS
OXYGEN SATURATION: 97 % | DIASTOLIC BLOOD PRESSURE: 64 MMHG | RESPIRATION RATE: 18 BRPM | SYSTOLIC BLOOD PRESSURE: 90 MMHG | HEART RATE: 95 BPM | TEMPERATURE: 97 F

## 2023-01-01 VITALS
WEIGHT: 145.06 LBS | OXYGEN SATURATION: 99 % | RESPIRATION RATE: 20 BRPM | HEART RATE: 103 BPM | TEMPERATURE: 98 F | HEIGHT: 66 IN | SYSTOLIC BLOOD PRESSURE: 142 MMHG | DIASTOLIC BLOOD PRESSURE: 80 MMHG

## 2023-01-01 VITALS
RESPIRATION RATE: 16 BRPM | DIASTOLIC BLOOD PRESSURE: 82 MMHG | HEART RATE: 102 BPM | SYSTOLIC BLOOD PRESSURE: 136 MMHG | OXYGEN SATURATION: 98 %

## 2023-01-01 VITALS
HEART RATE: 76 BPM | SYSTOLIC BLOOD PRESSURE: 104 MMHG | DIASTOLIC BLOOD PRESSURE: 67 MMHG | TEMPERATURE: 98 F | OXYGEN SATURATION: 95 % | RESPIRATION RATE: 18 BRPM

## 2023-01-01 VITALS
BODY MASS INDEX: 26.54 KG/M2 | DIASTOLIC BLOOD PRESSURE: 78 MMHG | HEIGHT: 63.58 IN | RESPIRATION RATE: 16 BRPM | OXYGEN SATURATION: 99 % | TEMPERATURE: 97.9 F | SYSTOLIC BLOOD PRESSURE: 123 MMHG | HEART RATE: 98 BPM | WEIGHT: 151.68 LBS

## 2023-01-01 VITALS
OXYGEN SATURATION: 96 % | DIASTOLIC BLOOD PRESSURE: 70 MMHG | RESPIRATION RATE: 19 BRPM | TEMPERATURE: 98 F | SYSTOLIC BLOOD PRESSURE: 108 MMHG | HEART RATE: 96 BPM

## 2023-01-01 VITALS
DIASTOLIC BLOOD PRESSURE: 64 MMHG | WEIGHT: 149.91 LBS | HEIGHT: 67.99 IN | SYSTOLIC BLOOD PRESSURE: 96 MMHG | TEMPERATURE: 98 F | HEART RATE: 107 BPM | OXYGEN SATURATION: 88 % | RESPIRATION RATE: 20 BRPM

## 2023-01-01 DIAGNOSIS — R59.1 GENERALIZED ENLARGED LYMPH NODES: ICD-10-CM

## 2023-01-01 DIAGNOSIS — I10 ESSENTIAL (PRIMARY) HYPERTENSION: ICD-10-CM

## 2023-01-01 DIAGNOSIS — Z90.49 ACQUIRED ABSENCE OF OTHER SPECIFIED PARTS OF DIGESTIVE TRACT: Chronic | ICD-10-CM

## 2023-01-01 DIAGNOSIS — I26.99 OTHER PULMONARY EMBOLISM W/OUT ACUTE COR PULMONALE: ICD-10-CM

## 2023-01-01 DIAGNOSIS — F41.8 OTHER SPECIFIED ANXIETY DISORDERS: ICD-10-CM

## 2023-01-01 DIAGNOSIS — R93.5 ABNORMAL FINDINGS ON DIAGNOSTIC IMAGING OF OTHER ABDOMINAL REGIONS, INCLUDING RETROPERITONEUM: ICD-10-CM

## 2023-01-01 DIAGNOSIS — Z98.42 CATARACT EXTRACTION STATUS, LEFT EYE: Chronic | ICD-10-CM

## 2023-01-01 DIAGNOSIS — I50.9 HEART FAILURE, UNSPECIFIED: ICD-10-CM

## 2023-01-01 DIAGNOSIS — R77.8 OTHER SPECIFIED ABNORMALITIES OF PLASMA PROTEINS: ICD-10-CM
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
## 2023-01-01 DIAGNOSIS — N39.0 URINARY TRACT INFECTION, SITE NOT SPECIFIED: ICD-10-CM

## 2023-01-01 DIAGNOSIS — F41.9 ANXIETY DISORDER, UNSPECIFIED: ICD-10-CM

## 2023-01-01 DIAGNOSIS — I50.23 ACUTE ON CHRONIC SYSTOLIC (CONGESTIVE) HEART FAILURE: ICD-10-CM

## 2023-01-01 DIAGNOSIS — S32.020A WEDGE COMPRESSION FRACTURE OF SECOND LUMBAR VERTEBRA, INITIAL ENCOUNTER FOR CLOSED FRACTURE: ICD-10-CM

## 2023-01-01 DIAGNOSIS — I26.99 OTHER PULMONARY EMBOLISM WITHOUT ACUTE COR PULMONALE: ICD-10-CM

## 2023-01-01 DIAGNOSIS — S72.009A FRACTURE OF UNSPECIFIED PART OF NECK OF UNSPECIFIED FEMUR, INITIAL ENCOUNTER FOR CLOSED FRACTURE: ICD-10-CM

## 2023-01-01 DIAGNOSIS — Z01.818 ENCOUNTER FOR OTHER PREPROCEDURAL EXAMINATION: ICD-10-CM

## 2023-01-01 DIAGNOSIS — R07.9 CHEST PAIN, UNSPECIFIED: ICD-10-CM

## 2023-01-01 DIAGNOSIS — J96.01 ACUTE RESPIRATORY FAILURE WITH HYPOXIA: ICD-10-CM

## 2023-01-01 DIAGNOSIS — Z29.9 ENCOUNTER FOR PROPHYLACTIC MEASURES, UNSPECIFIED: ICD-10-CM

## 2023-01-01 DIAGNOSIS — I50.22 CHRONIC SYSTOLIC (CONGESTIVE) HEART FAILURE: ICD-10-CM

## 2023-01-01 DIAGNOSIS — Z71.89 OTHER SPECIFIED COUNSELING: ICD-10-CM

## 2023-01-01 DIAGNOSIS — S72.002A FRACTURE OF UNSPECIFIED PART OF NECK OF LEFT FEMUR, INITIAL ENCOUNTER FOR CLOSED FRACTURE: ICD-10-CM

## 2023-01-01 LAB
-  AMIKACIN: SIGNIFICANT CHANGE UP
-  AMOXICILLIN/CLAVULANIC ACID: SIGNIFICANT CHANGE UP
-  AMPICILLIN/SULBACTAM: SIGNIFICANT CHANGE UP
-  AMPICILLIN: SIGNIFICANT CHANGE UP
-  AZTREONAM: SIGNIFICANT CHANGE UP
-  CEFAZOLIN: SIGNIFICANT CHANGE UP
-  CEFEPIME: SIGNIFICANT CHANGE UP
-  CEFOXITIN: SIGNIFICANT CHANGE UP
-  CEFTRIAXONE: SIGNIFICANT CHANGE UP
-  CEFUROXIME: SIGNIFICANT CHANGE UP
-  CIPROFLOXACIN: SIGNIFICANT CHANGE UP
-  ERTAPENEM: SIGNIFICANT CHANGE UP
-  GENTAMICIN: SIGNIFICANT CHANGE UP
-  IMIPENEM: SIGNIFICANT CHANGE UP
-  LEVOFLOXACIN: SIGNIFICANT CHANGE UP
-  MEROPENEM: SIGNIFICANT CHANGE UP
-  NITROFURANTOIN: SIGNIFICANT CHANGE UP
-  PIPERACILLIN/TAZOBACTAM: SIGNIFICANT CHANGE UP
-  TOBRAMYCIN: SIGNIFICANT CHANGE UP
-  TRIMETHOPRIM/SULFAMETHOXAZOLE: SIGNIFICANT CHANGE UP
ALBUMIN SERPL ELPH-MCNC: 3.3 G/DL — SIGNIFICANT CHANGE UP (ref 3.3–5)
ALBUMIN SERPL ELPH-MCNC: 3.3 G/DL — SIGNIFICANT CHANGE UP (ref 3.3–5)
ALBUMIN SERPL ELPH-MCNC: 4 G/DL — SIGNIFICANT CHANGE UP (ref 3.3–5)
ALP SERPL-CCNC: 50 U/L — SIGNIFICANT CHANGE UP (ref 40–120)
ALP SERPL-CCNC: 52 U/L — SIGNIFICANT CHANGE UP (ref 40–120)
ALP SERPL-CCNC: 77 U/L — SIGNIFICANT CHANGE UP (ref 40–120)
ALT FLD-CCNC: 19 U/L — SIGNIFICANT CHANGE UP (ref 10–45)
ALT FLD-CCNC: 5 U/L — LOW (ref 10–45)
ALT FLD-CCNC: 5 U/L — LOW (ref 10–45)
ANION GAP SERPL CALC-SCNC: 11 MMOL/L — SIGNIFICANT CHANGE UP (ref 5–17)
ANION GAP SERPL CALC-SCNC: 11 MMOL/L — SIGNIFICANT CHANGE UP (ref 5–17)
ANION GAP SERPL CALC-SCNC: 12 MMOL/L — SIGNIFICANT CHANGE UP (ref 5–17)
ANION GAP SERPL CALC-SCNC: 13 MMOL/L — SIGNIFICANT CHANGE UP (ref 5–17)
ANION GAP SERPL CALC-SCNC: 14 MMOL/L — SIGNIFICANT CHANGE UP (ref 5–17)
ANION GAP SERPL CALC-SCNC: 14 MMOL/L — SIGNIFICANT CHANGE UP (ref 5–17)
ANION GAP SERPL CALC-SCNC: 15 MMOL/L — SIGNIFICANT CHANGE UP (ref 5–17)
ANION GAP SERPL CALC-SCNC: 16 MMOL/L — SIGNIFICANT CHANGE UP (ref 5–17)
ANION GAP SERPL CALC-SCNC: 17 MMOL/L — SIGNIFICANT CHANGE UP (ref 5–17)
APPEARANCE UR: ABNORMAL
APPEARANCE UR: CLEAR — SIGNIFICANT CHANGE UP
APPEARANCE: CLEAR
APTT BLD: 24.8 SEC — LOW (ref 27.5–35.5)
APTT BLD: 26.7 SEC — LOW (ref 27.5–35.5)
APTT BLD: 29.6 SEC — SIGNIFICANT CHANGE UP (ref 27.5–35.5)
AST SERPL-CCNC: 16 U/L — SIGNIFICANT CHANGE UP (ref 10–40)
AST SERPL-CCNC: 19 U/L — SIGNIFICANT CHANGE UP (ref 10–40)
AST SERPL-CCNC: 28 U/L — SIGNIFICANT CHANGE UP (ref 10–40)
BACTERIA # UR AUTO: ABNORMAL
BACTERIA # UR AUTO: NEGATIVE — SIGNIFICANT CHANGE UP
BACTERIA UR CULT: ABNORMAL
BASE EXCESS BLDV CALC-SCNC: 3.2 MMOL/L — HIGH (ref -2–3)
BASOPHILS # BLD AUTO: 0 K/UL — SIGNIFICANT CHANGE UP (ref 0–0.2)
BASOPHILS # BLD AUTO: 0.1 K/UL — SIGNIFICANT CHANGE UP (ref 0–0.2)
BASOPHILS NFR BLD AUTO: 0 % — SIGNIFICANT CHANGE UP (ref 0–2)
BASOPHILS NFR BLD AUTO: 0.9 % — SIGNIFICANT CHANGE UP (ref 0–2)
BILIRUB SERPL-MCNC: 0.5 MG/DL — SIGNIFICANT CHANGE UP (ref 0.2–1.2)
BILIRUB SERPL-MCNC: 0.6 MG/DL — SIGNIFICANT CHANGE UP (ref 0.2–1.2)
BILIRUB SERPL-MCNC: 0.7 MG/DL — SIGNIFICANT CHANGE UP (ref 0.2–1.2)
BILIRUB UR-MCNC: NEGATIVE — SIGNIFICANT CHANGE UP
BILIRUB UR-MCNC: NEGATIVE — SIGNIFICANT CHANGE UP
BILIRUBIN URINE: NEGATIVE
BLD GP AB SCN SERPL QL: NEGATIVE — SIGNIFICANT CHANGE UP
BLOOD URINE: NEGATIVE
BUN SERPL-MCNC: 14 MG/DL — SIGNIFICANT CHANGE UP (ref 7–23)
BUN SERPL-MCNC: 16 MG/DL — SIGNIFICANT CHANGE UP (ref 7–23)
BUN SERPL-MCNC: 17 MG/DL — SIGNIFICANT CHANGE UP (ref 7–23)
BUN SERPL-MCNC: 18 MG/DL — SIGNIFICANT CHANGE UP (ref 7–23)
BUN SERPL-MCNC: 18 MG/DL — SIGNIFICANT CHANGE UP (ref 7–23)
BUN SERPL-MCNC: 19 MG/DL — SIGNIFICANT CHANGE UP (ref 7–23)
BUN SERPL-MCNC: 20 MG/DL — SIGNIFICANT CHANGE UP (ref 7–23)
BUN SERPL-MCNC: 20 MG/DL — SIGNIFICANT CHANGE UP (ref 7–23)
BUN SERPL-MCNC: 21 MG/DL — SIGNIFICANT CHANGE UP (ref 7–23)
BUN SERPL-MCNC: 22 MG/DL — SIGNIFICANT CHANGE UP (ref 7–23)
BUN SERPL-MCNC: 23 MG/DL — SIGNIFICANT CHANGE UP (ref 7–23)
BUN SERPL-MCNC: 26 MG/DL — HIGH (ref 7–23)
BUN SERPL-MCNC: 27 MG/DL — HIGH (ref 7–23)
CA-I SERPL-SCNC: 1.13 MMOL/L — LOW (ref 1.15–1.33)
CALCIUM SERPL-MCNC: 8.2 MG/DL — LOW (ref 8.4–10.5)
CALCIUM SERPL-MCNC: 8.3 MG/DL — LOW (ref 8.4–10.5)
CALCIUM SERPL-MCNC: 8.4 MG/DL — SIGNIFICANT CHANGE UP (ref 8.4–10.5)
CALCIUM SERPL-MCNC: 8.4 MG/DL — SIGNIFICANT CHANGE UP (ref 8.4–10.5)
CALCIUM SERPL-MCNC: 8.5 MG/DL — SIGNIFICANT CHANGE UP (ref 8.4–10.5)
CALCIUM SERPL-MCNC: 8.5 MG/DL — SIGNIFICANT CHANGE UP (ref 8.4–10.5)
CALCIUM SERPL-MCNC: 8.6 MG/DL — SIGNIFICANT CHANGE UP (ref 8.4–10.5)
CALCIUM SERPL-MCNC: 8.7 MG/DL — SIGNIFICANT CHANGE UP (ref 8.4–10.5)
CALCIUM SERPL-MCNC: 8.8 MG/DL — SIGNIFICANT CHANGE UP (ref 8.4–10.5)
CALCIUM SERPL-MCNC: 9.1 MG/DL — SIGNIFICANT CHANGE UP (ref 8.4–10.5)
CALCIUM SERPL-MCNC: 9.2 MG/DL — SIGNIFICANT CHANGE UP (ref 8.4–10.5)
CALCIUM SERPL-MCNC: 9.2 MG/DL — SIGNIFICANT CHANGE UP (ref 8.4–10.5)
CHLORIDE BLDV-SCNC: 104 MMOL/L — SIGNIFICANT CHANGE UP (ref 96–108)
CHLORIDE SERPL-SCNC: 100 MMOL/L — SIGNIFICANT CHANGE UP (ref 96–108)
CHLORIDE SERPL-SCNC: 101 MMOL/L — SIGNIFICANT CHANGE UP (ref 96–108)
CHLORIDE SERPL-SCNC: 102 MMOL/L — SIGNIFICANT CHANGE UP (ref 96–108)
CHLORIDE SERPL-SCNC: 103 MMOL/L — SIGNIFICANT CHANGE UP (ref 96–108)
CHLORIDE SERPL-SCNC: 103 MMOL/L — SIGNIFICANT CHANGE UP (ref 96–108)
CHLORIDE SERPL-SCNC: 104 MMOL/L — SIGNIFICANT CHANGE UP (ref 96–108)
CHLORIDE SERPL-SCNC: 105 MMOL/L — SIGNIFICANT CHANGE UP (ref 96–108)
CHLORIDE SERPL-SCNC: 105 MMOL/L — SIGNIFICANT CHANGE UP (ref 96–108)
CHLORIDE SERPL-SCNC: 106 MMOL/L — SIGNIFICANT CHANGE UP (ref 96–108)
CHLORIDE SERPL-SCNC: 106 MMOL/L — SIGNIFICANT CHANGE UP (ref 96–108)
CK SERPL-CCNC: 36 U/L — SIGNIFICANT CHANGE UP (ref 25–170)
CO2 BLDV-SCNC: 28 MMOL/L — HIGH (ref 22–26)
CO2 SERPL-SCNC: 22 MMOL/L — SIGNIFICANT CHANGE UP (ref 22–31)
CO2 SERPL-SCNC: 23 MMOL/L — SIGNIFICANT CHANGE UP (ref 22–31)
CO2 SERPL-SCNC: 24 MMOL/L — SIGNIFICANT CHANGE UP (ref 22–31)
CO2 SERPL-SCNC: 24 MMOL/L — SIGNIFICANT CHANGE UP (ref 22–31)
CO2 SERPL-SCNC: 25 MMOL/L — SIGNIFICANT CHANGE UP (ref 22–31)
CO2 SERPL-SCNC: 26 MMOL/L — SIGNIFICANT CHANGE UP (ref 22–31)
CO2 SERPL-SCNC: 26 MMOL/L — SIGNIFICANT CHANGE UP (ref 22–31)
CO2 SERPL-SCNC: 27 MMOL/L — SIGNIFICANT CHANGE UP (ref 22–31)
CO2 SERPL-SCNC: 27 MMOL/L — SIGNIFICANT CHANGE UP (ref 22–31)
CO2 SERPL-SCNC: 29 MMOL/L — SIGNIFICANT CHANGE UP (ref 22–31)
CO2 SERPL-SCNC: 29 MMOL/L — SIGNIFICANT CHANGE UP (ref 22–31)
COLOR SPEC: YELLOW — SIGNIFICANT CHANGE UP
COLOR SPEC: YELLOW — SIGNIFICANT CHANGE UP
COLOR: YELLOW
CREAT SERPL-MCNC: 0.52 MG/DL — SIGNIFICANT CHANGE UP (ref 0.5–1.3)
CREAT SERPL-MCNC: 0.54 MG/DL — SIGNIFICANT CHANGE UP (ref 0.5–1.3)
CREAT SERPL-MCNC: 0.56 MG/DL — SIGNIFICANT CHANGE UP (ref 0.5–1.3)
CREAT SERPL-MCNC: 0.58 MG/DL — SIGNIFICANT CHANGE UP (ref 0.5–1.3)
CREAT SERPL-MCNC: 0.59 MG/DL — SIGNIFICANT CHANGE UP (ref 0.5–1.3)
CREAT SERPL-MCNC: 0.6 MG/DL — SIGNIFICANT CHANGE UP (ref 0.5–1.3)
CREAT SERPL-MCNC: 0.63 MG/DL — SIGNIFICANT CHANGE UP (ref 0.5–1.3)
CREAT SERPL-MCNC: 0.65 MG/DL — SIGNIFICANT CHANGE UP (ref 0.5–1.3)
CREAT SERPL-MCNC: 0.66 MG/DL — SIGNIFICANT CHANGE UP (ref 0.5–1.3)
CULTURE RESULTS: NO GROWTH — SIGNIFICANT CHANGE UP
CULTURE RESULTS: SIGNIFICANT CHANGE UP
DIFF PNL FLD: ABNORMAL
DIFF PNL FLD: NEGATIVE — SIGNIFICANT CHANGE UP
DNA PLOIDY SPEC FC-IMP: SIGNIFICANT CHANGE UP
DNA PLOIDY SPEC FC-IMP: SIGNIFICANT CHANGE UP
EGFR: 84 ML/MIN/1.73M2 — SIGNIFICANT CHANGE UP
EGFR: 85 ML/MIN/1.73M2 — SIGNIFICANT CHANGE UP
EGFR: 85 ML/MIN/1.73M2 — SIGNIFICANT CHANGE UP
EGFR: 86 ML/MIN/1.73M2 — SIGNIFICANT CHANGE UP
EGFR: 87 ML/MIN/1.73M2 — SIGNIFICANT CHANGE UP
EGFR: 88 ML/MIN/1.73M2 — SIGNIFICANT CHANGE UP
EGFR: 89 ML/MIN/1.73M2 — SIGNIFICANT CHANGE UP
ELLIPTOCYTES BLD QL SMEAR: SLIGHT — SIGNIFICANT CHANGE UP
EOSINOPHIL # BLD AUTO: 0.15 K/UL — SIGNIFICANT CHANGE UP (ref 0–0.5)
EOSINOPHIL # BLD AUTO: 0.29 K/UL — SIGNIFICANT CHANGE UP (ref 0–0.5)
EOSINOPHIL NFR BLD AUTO: 1.7 % — SIGNIFICANT CHANGE UP (ref 0–6)
EOSINOPHIL NFR BLD AUTO: 2.6 % — SIGNIFICANT CHANGE UP (ref 0–6)
EPI CELLS # UR: 1 /HPF — SIGNIFICANT CHANGE UP
EPI CELLS # UR: 4 /HPF — SIGNIFICANT CHANGE UP
GAS PNL BLDV: 140 MMOL/L — SIGNIFICANT CHANGE UP (ref 136–145)
GAS PNL BLDV: SIGNIFICANT CHANGE UP
GAS PNL BLDV: SIGNIFICANT CHANGE UP
GIANT PLATELETS BLD QL SMEAR: PRESENT — SIGNIFICANT CHANGE UP
GLUCOSE BLDC GLUCOMTR-MCNC: 91 MG/DL — SIGNIFICANT CHANGE UP (ref 70–99)
GLUCOSE BLDV-MCNC: 108 MG/DL — HIGH (ref 70–99)
GLUCOSE QUALITATIVE U: NEGATIVE
GLUCOSE SERPL-MCNC: 100 MG/DL — HIGH (ref 70–99)
GLUCOSE SERPL-MCNC: 105 MG/DL — HIGH (ref 70–99)
GLUCOSE SERPL-MCNC: 105 MG/DL — HIGH (ref 70–99)
GLUCOSE SERPL-MCNC: 113 MG/DL — HIGH (ref 70–99)
GLUCOSE SERPL-MCNC: 113 MG/DL — HIGH (ref 70–99)
GLUCOSE SERPL-MCNC: 115 MG/DL — HIGH (ref 70–99)
GLUCOSE SERPL-MCNC: 117 MG/DL — HIGH (ref 70–99)
GLUCOSE SERPL-MCNC: 118 MG/DL — HIGH (ref 70–99)
GLUCOSE SERPL-MCNC: 119 MG/DL — HIGH (ref 70–99)
GLUCOSE SERPL-MCNC: 120 MG/DL — HIGH (ref 70–99)
GLUCOSE SERPL-MCNC: 76 MG/DL — SIGNIFICANT CHANGE UP (ref 70–99)
GLUCOSE SERPL-MCNC: 79 MG/DL — SIGNIFICANT CHANGE UP (ref 70–99)
GLUCOSE SERPL-MCNC: 88 MG/DL — SIGNIFICANT CHANGE UP (ref 70–99)
GLUCOSE SERPL-MCNC: 91 MG/DL — SIGNIFICANT CHANGE UP (ref 70–99)
GLUCOSE SERPL-MCNC: 91 MG/DL — SIGNIFICANT CHANGE UP (ref 70–99)
GLUCOSE SERPL-MCNC: 94 MG/DL — SIGNIFICANT CHANGE UP (ref 70–99)
GLUCOSE SERPL-MCNC: 98 MG/DL — SIGNIFICANT CHANGE UP (ref 70–99)
GLUCOSE UR QL: NEGATIVE — SIGNIFICANT CHANGE UP
GLUCOSE UR QL: NEGATIVE — SIGNIFICANT CHANGE UP
HCO3 BLDV-SCNC: 27 MMOL/L — SIGNIFICANT CHANGE UP (ref 22–29)
HCT VFR BLD CALC: 30.1 % — LOW (ref 34.5–45)
HCT VFR BLD CALC: 32.9 % — LOW (ref 34.5–45)
HCT VFR BLD CALC: 33.8 % — LOW (ref 34.5–45)
HCT VFR BLD CALC: 34.4 % — LOW (ref 34.5–45)
HCT VFR BLD CALC: 34.9 % — SIGNIFICANT CHANGE UP (ref 34.5–45)
HCT VFR BLD CALC: 34.9 % — SIGNIFICANT CHANGE UP (ref 34.5–45)
HCT VFR BLD CALC: 36.7 % — SIGNIFICANT CHANGE UP (ref 34.5–45)
HCT VFR BLD CALC: 37 % — SIGNIFICANT CHANGE UP (ref 34.5–45)
HCT VFR BLD CALC: 37.6 % — SIGNIFICANT CHANGE UP (ref 34.5–45)
HCT VFR BLD CALC: 38.1 % — SIGNIFICANT CHANGE UP (ref 34.5–45)
HCT VFR BLD CALC: 38.1 % — SIGNIFICANT CHANGE UP (ref 34.5–45)
HCT VFR BLD CALC: 39.9 % — SIGNIFICANT CHANGE UP (ref 34.5–45)
HCT VFR BLD CALC: 41.4 % — SIGNIFICANT CHANGE UP (ref 34.5–45)
HCT VFR BLD CALC: 41.6 % — SIGNIFICANT CHANGE UP (ref 34.5–45)
HCT VFR BLDA CALC: 40 % — SIGNIFICANT CHANGE UP (ref 34.5–46.5)
HGB BLD CALC-MCNC: 13.2 G/DL — SIGNIFICANT CHANGE UP (ref 11.7–16.1)
HGB BLD-MCNC: 10.6 G/DL — LOW (ref 11.5–15.5)
HGB BLD-MCNC: 11 G/DL — LOW (ref 11.5–15.5)
HGB BLD-MCNC: 11.1 G/DL — LOW (ref 11.5–15.5)
HGB BLD-MCNC: 11.3 G/DL — LOW (ref 11.5–15.5)
HGB BLD-MCNC: 11.3 G/DL — LOW (ref 11.5–15.5)
HGB BLD-MCNC: 11.4 G/DL — LOW (ref 11.5–15.5)
HGB BLD-MCNC: 11.6 G/DL — SIGNIFICANT CHANGE UP (ref 11.5–15.5)
HGB BLD-MCNC: 11.7 G/DL — SIGNIFICANT CHANGE UP (ref 11.5–15.5)
HGB BLD-MCNC: 11.8 G/DL — SIGNIFICANT CHANGE UP (ref 11.5–15.5)
HGB BLD-MCNC: 11.8 G/DL — SIGNIFICANT CHANGE UP (ref 11.5–15.5)
HGB BLD-MCNC: 12.3 G/DL — SIGNIFICANT CHANGE UP (ref 11.5–15.5)
HGB BLD-MCNC: 12.6 G/DL — SIGNIFICANT CHANGE UP (ref 11.5–15.5)
HGB BLD-MCNC: 13 G/DL — SIGNIFICANT CHANGE UP (ref 11.5–15.5)
HGB BLD-MCNC: 9.7 G/DL — LOW (ref 11.5–15.5)
HYALINE CASTS # UR AUTO: 1 /LPF — SIGNIFICANT CHANGE UP (ref 0–2)
HYPOCHROMIA BLD QL: SLIGHT — SIGNIFICANT CHANGE UP
INR BLD: 1.21 RATIO — HIGH (ref 0.88–1.16)
INR BLD: 1.29 RATIO — HIGH (ref 0.88–1.16)
INR BLD: 1.38 RATIO — HIGH (ref 0.88–1.16)
INR BLD: 1.42 RATIO — HIGH (ref 0.88–1.16)
INR BLD: 2.38 RATIO — HIGH (ref 0.88–1.16)
KETONES UR-MCNC: ABNORMAL
KETONES UR-MCNC: ABNORMAL
KETONES URINE: NEGATIVE
LACTATE BLDV-MCNC: 2 MMOL/L — SIGNIFICANT CHANGE UP (ref 0.5–2)
LDH SERPL L TO P-CCNC: 611 U/L — HIGH (ref 50–242)
LEUKOCYTE ESTERASE UR-ACNC: ABNORMAL
LEUKOCYTE ESTERASE UR-ACNC: NEGATIVE — SIGNIFICANT CHANGE UP
LEUKOCYTE ESTERASE URINE: ABNORMAL
LYMPHOCYTES # BLD AUTO: 0.16 K/UL — LOW (ref 1–3.3)
LYMPHOCYTES # BLD AUTO: 0.57 K/UL — LOW (ref 1–3.3)
LYMPHOCYTES # BLD AUTO: 1.8 % — LOW (ref 13–44)
LYMPHOCYTES # BLD AUTO: 5.2 % — LOW (ref 13–44)
MAGNESIUM SERPL-MCNC: 1.6 MG/DL — SIGNIFICANT CHANGE UP (ref 1.6–2.6)
MAGNESIUM SERPL-MCNC: 1.7 MG/DL — SIGNIFICANT CHANGE UP (ref 1.6–2.6)
MAGNESIUM SERPL-MCNC: 1.8 MG/DL — SIGNIFICANT CHANGE UP (ref 1.6–2.6)
MAGNESIUM SERPL-MCNC: 2.1 MG/DL — SIGNIFICANT CHANGE UP (ref 1.6–2.6)
MAGNESIUM SERPL-MCNC: 2.1 MG/DL — SIGNIFICANT CHANGE UP (ref 1.6–2.6)
MANUAL SMEAR VERIFICATION: SIGNIFICANT CHANGE UP
MANUAL SMEAR VERIFICATION: SIGNIFICANT CHANGE UP
MCHC RBC-ENTMCNC: 26.9 PG — LOW (ref 27–34)
MCHC RBC-ENTMCNC: 27 PG — SIGNIFICANT CHANGE UP (ref 27–34)
MCHC RBC-ENTMCNC: 27 PG — SIGNIFICANT CHANGE UP (ref 27–34)
MCHC RBC-ENTMCNC: 27.4 PG — SIGNIFICANT CHANGE UP (ref 27–34)
MCHC RBC-ENTMCNC: 27.5 PG — SIGNIFICANT CHANGE UP (ref 27–34)
MCHC RBC-ENTMCNC: 27.7 PG — SIGNIFICANT CHANGE UP (ref 27–34)
MCHC RBC-ENTMCNC: 27.8 PG — SIGNIFICANT CHANGE UP (ref 27–34)
MCHC RBC-ENTMCNC: 27.8 PG — SIGNIFICANT CHANGE UP (ref 27–34)
MCHC RBC-ENTMCNC: 27.9 PG — SIGNIFICANT CHANGE UP (ref 27–34)
MCHC RBC-ENTMCNC: 28 PG — SIGNIFICANT CHANGE UP (ref 27–34)
MCHC RBC-ENTMCNC: 28 PG — SIGNIFICANT CHANGE UP (ref 27–34)
MCHC RBC-ENTMCNC: 28.1 PG — SIGNIFICANT CHANGE UP (ref 27–34)
MCHC RBC-ENTMCNC: 28.2 PG — SIGNIFICANT CHANGE UP (ref 27–34)
MCHC RBC-ENTMCNC: 28.7 PG — SIGNIFICANT CHANGE UP (ref 27–34)
MCHC RBC-ENTMCNC: 30.4 GM/DL — LOW (ref 32–36)
MCHC RBC-ENTMCNC: 30.7 GM/DL — LOW (ref 32–36)
MCHC RBC-ENTMCNC: 30.8 GM/DL — LOW (ref 32–36)
MCHC RBC-ENTMCNC: 30.8 GM/DL — LOW (ref 32–36)
MCHC RBC-ENTMCNC: 31 GM/DL — LOW (ref 32–36)
MCHC RBC-ENTMCNC: 31.3 GM/DL — LOW (ref 32–36)
MCHC RBC-ENTMCNC: 31.4 GM/DL — LOW (ref 32–36)
MCHC RBC-ENTMCNC: 31.5 GM/DL — LOW (ref 32–36)
MCHC RBC-ENTMCNC: 31.6 GM/DL — LOW (ref 32–36)
MCHC RBC-ENTMCNC: 32.2 GM/DL — SIGNIFICANT CHANGE UP (ref 32–36)
MCHC RBC-ENTMCNC: 32.2 GM/DL — SIGNIFICANT CHANGE UP (ref 32–36)
MCHC RBC-ENTMCNC: 32.3 GM/DL — SIGNIFICANT CHANGE UP (ref 32–36)
MCHC RBC-ENTMCNC: 32.4 GM/DL — SIGNIFICANT CHANGE UP (ref 32–36)
MCHC RBC-ENTMCNC: 33.4 GM/DL — SIGNIFICANT CHANGE UP (ref 32–36)
MCV RBC AUTO: 85.8 FL — SIGNIFICANT CHANGE UP (ref 80–100)
MCV RBC AUTO: 86.4 FL — SIGNIFICANT CHANGE UP (ref 80–100)
MCV RBC AUTO: 86.6 FL — SIGNIFICANT CHANGE UP (ref 80–100)
MCV RBC AUTO: 87 FL — SIGNIFICANT CHANGE UP (ref 80–100)
MCV RBC AUTO: 87.1 FL — SIGNIFICANT CHANGE UP (ref 80–100)
MCV RBC AUTO: 87.2 FL — SIGNIFICANT CHANGE UP (ref 80–100)
MCV RBC AUTO: 87.8 FL — SIGNIFICANT CHANGE UP (ref 80–100)
MCV RBC AUTO: 88.1 FL — SIGNIFICANT CHANGE UP (ref 80–100)
MCV RBC AUTO: 88.1 FL — SIGNIFICANT CHANGE UP (ref 80–100)
MCV RBC AUTO: 88.2 FL — SIGNIFICANT CHANGE UP (ref 80–100)
MCV RBC AUTO: 88.6 FL — SIGNIFICANT CHANGE UP (ref 80–100)
MCV RBC AUTO: 88.8 FL — SIGNIFICANT CHANGE UP (ref 80–100)
MCV RBC AUTO: 89.1 FL — SIGNIFICANT CHANGE UP (ref 80–100)
MCV RBC AUTO: 89.8 FL — SIGNIFICANT CHANGE UP (ref 80–100)
METHOD TYPE: SIGNIFICANT CHANGE UP
MONOCYTES # BLD AUTO: 0.77 K/UL — SIGNIFICANT CHANGE UP (ref 0–0.9)
MONOCYTES # BLD AUTO: 0.8 K/UL — SIGNIFICANT CHANGE UP (ref 0–0.9)
MONOCYTES NFR BLD AUTO: 7 % — SIGNIFICANT CHANGE UP (ref 2–14)
MONOCYTES NFR BLD AUTO: 8.8 % — SIGNIFICANT CHANGE UP (ref 2–14)
MRSA PCR RESULT.: SIGNIFICANT CHANGE UP
MRSA PCR RESULT.: SIGNIFICANT CHANGE UP
NEUTROPHILS # BLD AUTO: 7.96 K/UL — HIGH (ref 1.8–7.4)
NEUTROPHILS # BLD AUTO: 9.32 K/UL — HIGH (ref 1.8–7.4)
NEUTROPHILS NFR BLD AUTO: 84.3 % — HIGH (ref 43–77)
NEUTROPHILS NFR BLD AUTO: 87.7 % — HIGH (ref 43–77)
NITRITE UR-MCNC: NEGATIVE — SIGNIFICANT CHANGE UP
NITRITE UR-MCNC: POSITIVE
NITRITE URINE: NEGATIVE
NON-GYNECOLOGICAL CYTOLOGY STUDY: SIGNIFICANT CHANGE UP
NRBC # BLD: 0 /100 WBCS — SIGNIFICANT CHANGE UP (ref 0–0)
NT-PROBNP SERPL-SCNC: 4952 PG/ML — HIGH (ref 0–300)
NT-PROBNP SERPL-SCNC: 9068 PG/ML — HIGH (ref 0–300)
ORGANISM # SPEC MICROSCOPIC CNT: SIGNIFICANT CHANGE UP
ORGANISM # SPEC MICROSCOPIC CNT: SIGNIFICANT CHANGE UP
OVALOCYTES BLD QL SMEAR: SLIGHT — SIGNIFICANT CHANGE UP
PCO2 BLDV: 37 MMHG — LOW (ref 39–42)
PH BLDV: 7.47 — HIGH (ref 7.32–7.43)
PH UR: 6.5 — SIGNIFICANT CHANGE UP (ref 5–8)
PH UR: 6.5 — SIGNIFICANT CHANGE UP (ref 5–8)
PH URINE: 6
PHOSPHATE SERPL-MCNC: 2.9 MG/DL — SIGNIFICANT CHANGE UP (ref 2.5–4.5)
PHOSPHATE SERPL-MCNC: 3.2 MG/DL — SIGNIFICANT CHANGE UP (ref 2.5–4.5)
PLAT MORPH BLD: ABNORMAL
PLAT MORPH BLD: NORMAL — SIGNIFICANT CHANGE UP
PLATELET # BLD AUTO: 165 K/UL — SIGNIFICANT CHANGE UP (ref 150–400)
PLATELET # BLD AUTO: 165 K/UL — SIGNIFICANT CHANGE UP (ref 150–400)
PLATELET # BLD AUTO: 173 K/UL — SIGNIFICANT CHANGE UP (ref 150–400)
PLATELET # BLD AUTO: 180 K/UL — SIGNIFICANT CHANGE UP (ref 150–400)
PLATELET # BLD AUTO: 182 K/UL — SIGNIFICANT CHANGE UP (ref 150–400)
PLATELET # BLD AUTO: 188 K/UL — SIGNIFICANT CHANGE UP (ref 150–400)
PLATELET # BLD AUTO: 194 K/UL — SIGNIFICANT CHANGE UP (ref 150–400)
PLATELET # BLD AUTO: 203 K/UL — SIGNIFICANT CHANGE UP (ref 150–400)
PLATELET # BLD AUTO: 203 K/UL — SIGNIFICANT CHANGE UP (ref 150–400)
PLATELET # BLD AUTO: 209 K/UL — SIGNIFICANT CHANGE UP (ref 150–400)
PLATELET # BLD AUTO: 217 K/UL — SIGNIFICANT CHANGE UP (ref 150–400)
PLATELET # BLD AUTO: 222 K/UL — SIGNIFICANT CHANGE UP (ref 150–400)
PLATELET # BLD AUTO: 234 K/UL — SIGNIFICANT CHANGE UP (ref 150–400)
PLATELET # BLD AUTO: 291 K/UL — SIGNIFICANT CHANGE UP (ref 150–400)
PO2 BLDV: 55 MMHG — HIGH (ref 25–45)
POIKILOCYTOSIS BLD QL AUTO: SIGNIFICANT CHANGE UP
POIKILOCYTOSIS BLD QL AUTO: SLIGHT — SIGNIFICANT CHANGE UP
POTASSIUM BLDV-SCNC: 3.2 MMOL/L — LOW (ref 3.5–5.1)
POTASSIUM SERPL-MCNC: 3 MMOL/L — LOW (ref 3.5–5.3)
POTASSIUM SERPL-MCNC: 3.1 MMOL/L — LOW (ref 3.5–5.3)
POTASSIUM SERPL-MCNC: 3.3 MMOL/L — LOW (ref 3.5–5.3)
POTASSIUM SERPL-MCNC: 3.4 MMOL/L — LOW (ref 3.5–5.3)
POTASSIUM SERPL-MCNC: 3.5 MMOL/L — SIGNIFICANT CHANGE UP (ref 3.5–5.3)
POTASSIUM SERPL-MCNC: 3.5 MMOL/L — SIGNIFICANT CHANGE UP (ref 3.5–5.3)
POTASSIUM SERPL-MCNC: 3.7 MMOL/L — SIGNIFICANT CHANGE UP (ref 3.5–5.3)
POTASSIUM SERPL-MCNC: 3.8 MMOL/L — SIGNIFICANT CHANGE UP (ref 3.5–5.3)
POTASSIUM SERPL-MCNC: 3.8 MMOL/L — SIGNIFICANT CHANGE UP (ref 3.5–5.3)
POTASSIUM SERPL-MCNC: 3.9 MMOL/L — SIGNIFICANT CHANGE UP (ref 3.5–5.3)
POTASSIUM SERPL-MCNC: 4 MMOL/L — SIGNIFICANT CHANGE UP (ref 3.5–5.3)
POTASSIUM SERPL-MCNC: 4.2 MMOL/L — SIGNIFICANT CHANGE UP (ref 3.5–5.3)
POTASSIUM SERPL-MCNC: 4.5 MMOL/L — SIGNIFICANT CHANGE UP (ref 3.5–5.3)
POTASSIUM SERPL-SCNC: 3 MMOL/L — LOW (ref 3.5–5.3)
POTASSIUM SERPL-SCNC: 3.1 MMOL/L — LOW (ref 3.5–5.3)
POTASSIUM SERPL-SCNC: 3.3 MMOL/L — LOW (ref 3.5–5.3)
POTASSIUM SERPL-SCNC: 3.4 MMOL/L — LOW (ref 3.5–5.3)
POTASSIUM SERPL-SCNC: 3.5 MMOL/L — SIGNIFICANT CHANGE UP (ref 3.5–5.3)
POTASSIUM SERPL-SCNC: 3.5 MMOL/L — SIGNIFICANT CHANGE UP (ref 3.5–5.3)
POTASSIUM SERPL-SCNC: 3.7 MMOL/L — SIGNIFICANT CHANGE UP (ref 3.5–5.3)
POTASSIUM SERPL-SCNC: 3.8 MMOL/L — SIGNIFICANT CHANGE UP (ref 3.5–5.3)
POTASSIUM SERPL-SCNC: 3.8 MMOL/L — SIGNIFICANT CHANGE UP (ref 3.5–5.3)
POTASSIUM SERPL-SCNC: 3.9 MMOL/L — SIGNIFICANT CHANGE UP (ref 3.5–5.3)
POTASSIUM SERPL-SCNC: 4 MMOL/L — SIGNIFICANT CHANGE UP (ref 3.5–5.3)
POTASSIUM SERPL-SCNC: 4.2 MMOL/L — SIGNIFICANT CHANGE UP (ref 3.5–5.3)
POTASSIUM SERPL-SCNC: 4.5 MMOL/L — SIGNIFICANT CHANGE UP (ref 3.5–5.3)
PROT SERPL-MCNC: 5.9 G/DL — LOW (ref 6–8.3)
PROT SERPL-MCNC: 6.3 G/DL — SIGNIFICANT CHANGE UP (ref 6–8.3)
PROT SERPL-MCNC: 6.6 G/DL — SIGNIFICANT CHANGE UP (ref 6–8.3)
PROT UR-MCNC: ABNORMAL
PROT UR-MCNC: ABNORMAL
PROTEIN URINE: NORMAL
PROTHROM AB SERPL-ACNC: 14.1 SEC — HIGH (ref 10.5–13.4)
PROTHROM AB SERPL-ACNC: 15 SEC — HIGH (ref 10.5–13.4)
PROTHROM AB SERPL-ACNC: 15.9 SEC — HIGH (ref 10.5–13.4)
PROTHROM AB SERPL-ACNC: 16.4 SEC — HIGH (ref 10.5–13.4)
PROTHROM AB SERPL-ACNC: 27.6 SEC — HIGH (ref 10.5–13.4)
RAPID RVP RESULT: SIGNIFICANT CHANGE UP
RBC # BLD: 3.45 M/UL — LOW (ref 3.8–5.2)
RBC # BLD: 3.81 M/UL — SIGNIFICANT CHANGE UP (ref 3.8–5.2)
RBC # BLD: 3.94 M/UL — SIGNIFICANT CHANGE UP (ref 3.8–5.2)
RBC # BLD: 3.96 M/UL — SIGNIFICANT CHANGE UP (ref 3.8–5.2)
RBC # BLD: 3.97 M/UL — SIGNIFICANT CHANGE UP (ref 3.8–5.2)
RBC # BLD: 4.01 M/UL — SIGNIFICANT CHANGE UP (ref 3.8–5.2)
RBC # BLD: 4.12 M/UL — SIGNIFICANT CHANGE UP (ref 3.8–5.2)
RBC # BLD: 4.16 M/UL — SIGNIFICANT CHANGE UP (ref 3.8–5.2)
RBC # BLD: 4.22 M/UL — SIGNIFICANT CHANGE UP (ref 3.8–5.2)
RBC # BLD: 4.3 M/UL — SIGNIFICANT CHANGE UP (ref 3.8–5.2)
RBC # BLD: 4.34 M/UL — SIGNIFICANT CHANGE UP (ref 3.8–5.2)
RBC # BLD: 4.58 M/UL — SIGNIFICANT CHANGE UP (ref 3.8–5.2)
RBC # BLD: 4.66 M/UL — SIGNIFICANT CHANGE UP (ref 3.8–5.2)
RBC # BLD: 4.72 M/UL — SIGNIFICANT CHANGE UP (ref 3.8–5.2)
RBC # FLD: 14.3 % — SIGNIFICANT CHANGE UP (ref 10.3–14.5)
RBC # FLD: 14.4 % — SIGNIFICANT CHANGE UP (ref 10.3–14.5)
RBC # FLD: 14.5 % — SIGNIFICANT CHANGE UP (ref 10.3–14.5)
RBC # FLD: 14.6 % — HIGH (ref 10.3–14.5)
RBC # FLD: 14.7 % — HIGH (ref 10.3–14.5)
RBC # FLD: 14.8 % — HIGH (ref 10.3–14.5)
RBC # FLD: 14.8 % — HIGH (ref 10.3–14.5)
RBC # FLD: 15 % — HIGH (ref 10.3–14.5)
RBC # FLD: 15 % — HIGH (ref 10.3–14.5)
RBC # FLD: 15.2 % — HIGH (ref 10.3–14.5)
RBC BLD AUTO: ABNORMAL
RBC BLD AUTO: ABNORMAL
RBC CASTS # UR COMP ASSIST: 2 /HPF — SIGNIFICANT CHANGE UP (ref 0–4)
RBC CASTS # UR COMP ASSIST: 4 /HPF — SIGNIFICANT CHANGE UP (ref 0–4)
RH IG SCN BLD-IMP: POSITIVE — SIGNIFICANT CHANGE UP
RH IG SCN BLD-IMP: POSITIVE — SIGNIFICANT CHANGE UP
S AUREUS DNA NOSE QL NAA+PROBE: SIGNIFICANT CHANGE UP
S AUREUS DNA NOSE QL NAA+PROBE: SIGNIFICANT CHANGE UP
SAO2 % BLDV: 88.3 % — HIGH (ref 67–88)
SARS-COV-2 RNA SPEC QL NAA+PROBE: SIGNIFICANT CHANGE UP
SCHISTOCYTES BLD QL AUTO: SLIGHT — SIGNIFICANT CHANGE UP
SODIUM SERPL-SCNC: 137 MMOL/L — SIGNIFICANT CHANGE UP (ref 135–145)
SODIUM SERPL-SCNC: 138 MMOL/L — SIGNIFICANT CHANGE UP (ref 135–145)
SODIUM SERPL-SCNC: 138 MMOL/L — SIGNIFICANT CHANGE UP (ref 135–145)
SODIUM SERPL-SCNC: 139 MMOL/L — SIGNIFICANT CHANGE UP (ref 135–145)
SODIUM SERPL-SCNC: 141 MMOL/L — SIGNIFICANT CHANGE UP (ref 135–145)
SODIUM SERPL-SCNC: 142 MMOL/L — SIGNIFICANT CHANGE UP (ref 135–145)
SODIUM SERPL-SCNC: 144 MMOL/L — SIGNIFICANT CHANGE UP (ref 135–145)
SODIUM SERPL-SCNC: 145 MMOL/L — SIGNIFICANT CHANGE UP (ref 135–145)
SODIUM SERPL-SCNC: 146 MMOL/L — HIGH (ref 135–145)
SODIUM SERPL-SCNC: 147 MMOL/L — HIGH (ref 135–145)
SP GR SPEC: 1.03 — HIGH (ref 1.01–1.02)
SP GR SPEC: >1.05 (ref 1.01–1.02)
SPECIFIC GRAVITY URINE: 1.02
SPECIMEN SOURCE: SIGNIFICANT CHANGE UP
SURGICAL PATHOLOGY STUDY: SIGNIFICANT CHANGE UP
TM INTERPRETATION: SIGNIFICANT CHANGE UP
TROPONIN T, HIGH SENSITIVITY RESULT: 241 NG/L — HIGH (ref 0–51)
TROPONIN T, HIGH SENSITIVITY RESULT: 250 NG/L — HIGH (ref 0–51)
TROPONIN T, HIGH SENSITIVITY RESULT: 252 NG/L — HIGH (ref 0–51)
TROPONIN T, HIGH SENSITIVITY RESULT: 49 NG/L — SIGNIFICANT CHANGE UP (ref 0–51)
TROPONIN T, HIGH SENSITIVITY RESULT: 51 NG/L — SIGNIFICANT CHANGE UP (ref 0–51)
UROBILINOGEN FLD QL: NEGATIVE — SIGNIFICANT CHANGE UP
UROBILINOGEN FLD QL: NEGATIVE — SIGNIFICANT CHANGE UP
UROBILINOGEN URINE: NORMAL
WBC # BLD: 10.16 K/UL — SIGNIFICANT CHANGE UP (ref 3.8–10.5)
WBC # BLD: 11.05 K/UL — HIGH (ref 3.8–10.5)
WBC # BLD: 13.43 K/UL — HIGH (ref 3.8–10.5)
WBC # BLD: 7.25 K/UL — SIGNIFICANT CHANGE UP (ref 3.8–10.5)
WBC # BLD: 7.46 K/UL — SIGNIFICANT CHANGE UP (ref 3.8–10.5)
WBC # BLD: 7.46 K/UL — SIGNIFICANT CHANGE UP (ref 3.8–10.5)
WBC # BLD: 7.96 K/UL — SIGNIFICANT CHANGE UP (ref 3.8–10.5)
WBC # BLD: 8.22 K/UL — SIGNIFICANT CHANGE UP (ref 3.8–10.5)
WBC # BLD: 9.04 K/UL — SIGNIFICANT CHANGE UP (ref 3.8–10.5)
WBC # BLD: 9.08 K/UL — SIGNIFICANT CHANGE UP (ref 3.8–10.5)
WBC # BLD: 9.46 K/UL — SIGNIFICANT CHANGE UP (ref 3.8–10.5)
WBC # BLD: 9.55 K/UL — SIGNIFICANT CHANGE UP (ref 3.8–10.5)
WBC # BLD: 9.85 K/UL — SIGNIFICANT CHANGE UP (ref 3.8–10.5)
WBC # BLD: 9.85 K/UL — SIGNIFICANT CHANGE UP (ref 3.8–10.5)
WBC # FLD AUTO: 10.16 K/UL — SIGNIFICANT CHANGE UP (ref 3.8–10.5)
WBC # FLD AUTO: 11.05 K/UL — HIGH (ref 3.8–10.5)
WBC # FLD AUTO: 13.43 K/UL — HIGH (ref 3.8–10.5)
WBC # FLD AUTO: 7.25 K/UL — SIGNIFICANT CHANGE UP (ref 3.8–10.5)
WBC # FLD AUTO: 7.46 K/UL — SIGNIFICANT CHANGE UP (ref 3.8–10.5)
WBC # FLD AUTO: 7.46 K/UL — SIGNIFICANT CHANGE UP (ref 3.8–10.5)
WBC # FLD AUTO: 7.96 K/UL — SIGNIFICANT CHANGE UP (ref 3.8–10.5)
WBC # FLD AUTO: 8.22 K/UL — SIGNIFICANT CHANGE UP (ref 3.8–10.5)
WBC # FLD AUTO: 9.04 K/UL — SIGNIFICANT CHANGE UP (ref 3.8–10.5)
WBC # FLD AUTO: 9.08 K/UL — SIGNIFICANT CHANGE UP (ref 3.8–10.5)
WBC # FLD AUTO: 9.46 K/UL — SIGNIFICANT CHANGE UP (ref 3.8–10.5)
WBC # FLD AUTO: 9.55 K/UL — SIGNIFICANT CHANGE UP (ref 3.8–10.5)
WBC # FLD AUTO: 9.85 K/UL — SIGNIFICANT CHANGE UP (ref 3.8–10.5)
WBC # FLD AUTO: 9.85 K/UL — SIGNIFICANT CHANGE UP (ref 3.8–10.5)
WBC UR QL: 2 /HPF — SIGNIFICANT CHANGE UP (ref 0–5)
WBC UR QL: 440 /HPF — HIGH (ref 0–5)

## 2023-01-01 PROCEDURE — 0225U NFCT DS DNA&RNA 21 SARSCOV2: CPT

## 2023-01-01 PROCEDURE — G1004: CPT

## 2023-01-01 PROCEDURE — 73502 X-RAY EXAM HIP UNI 2-3 VIEWS: CPT | Mod: 26,LT

## 2023-01-01 PROCEDURE — 85610 PROTHROMBIN TIME: CPT

## 2023-01-01 PROCEDURE — 99285 EMERGENCY DEPT VISIT HI MDM: CPT | Mod: 25

## 2023-01-01 PROCEDURE — 74174 CTA ABD&PLVS W/CONTRAST: CPT | Mod: 26,MG

## 2023-01-01 PROCEDURE — C9399: CPT

## 2023-01-01 PROCEDURE — 99222 1ST HOSP IP/OBS MODERATE 55: CPT | Mod: GC

## 2023-01-01 PROCEDURE — 85025 COMPLETE CBC W/AUTO DIFF WBC: CPT

## 2023-01-01 PROCEDURE — 21480 CLTX TMPRMAND DISLC 1ST/SBSQ: CPT | Mod: RT

## 2023-01-01 PROCEDURE — 99285 EMERGENCY DEPT VISIT HI MDM: CPT | Mod: FS

## 2023-01-01 PROCEDURE — 93005 ELECTROCARDIOGRAM TRACING: CPT

## 2023-01-01 PROCEDURE — 38505 NEEDLE BIOPSY LYMPH NODES: CPT

## 2023-01-01 PROCEDURE — 80053 COMPREHEN METABOLIC PANEL: CPT

## 2023-01-01 PROCEDURE — 83880 ASSAY OF NATRIURETIC PEPTIDE: CPT

## 2023-01-01 PROCEDURE — 99232 SBSQ HOSP IP/OBS MODERATE 35: CPT

## 2023-01-01 PROCEDURE — 99233 SBSQ HOSP IP/OBS HIGH 50: CPT

## 2023-01-01 PROCEDURE — 70450 CT HEAD/BRAIN W/O DYE: CPT | Mod: MG

## 2023-01-01 PROCEDURE — 73564 X-RAY EXAM KNEE 4 OR MORE: CPT

## 2023-01-01 PROCEDURE — 81001 URINALYSIS AUTO W/SCOPE: CPT

## 2023-01-01 PROCEDURE — 99285 EMERGENCY DEPT VISIT HI MDM: CPT

## 2023-01-01 PROCEDURE — 80048 BASIC METABOLIC PNL TOTAL CA: CPT

## 2023-01-01 PROCEDURE — 72192 CT PELVIS W/O DYE: CPT | Mod: MA

## 2023-01-01 PROCEDURE — 88342 IMHCHEM/IMCYTCHM 1ST ANTB: CPT | Mod: 26,59

## 2023-01-01 PROCEDURE — 87086 URINE CULTURE/COLONY COUNT: CPT

## 2023-01-01 PROCEDURE — 36415 COLL VENOUS BLD VENIPUNCTURE: CPT

## 2023-01-01 PROCEDURE — 82330 ASSAY OF CALCIUM: CPT

## 2023-01-01 PROCEDURE — 84295 ASSAY OF SERUM SODIUM: CPT

## 2023-01-01 PROCEDURE — 82803 BLOOD GASES ANY COMBINATION: CPT

## 2023-01-01 PROCEDURE — 96374 THER/PROPH/DIAG INJ IV PUSH: CPT | Mod: XU

## 2023-01-01 PROCEDURE — 97165 OT EVAL LOW COMPLEX 30 MIN: CPT

## 2023-01-01 PROCEDURE — C1889: CPT

## 2023-01-01 PROCEDURE — 82962 GLUCOSE BLOOD TEST: CPT

## 2023-01-01 PROCEDURE — 84484 ASSAY OF TROPONIN QUANT: CPT

## 2023-01-01 PROCEDURE — 71045 X-RAY EXAM CHEST 1 VIEW: CPT

## 2023-01-01 PROCEDURE — 88189 FLOWCYTOMETRY/READ 16 & >: CPT

## 2023-01-01 PROCEDURE — 86900 BLOOD TYPING SEROLOGIC ABO: CPT

## 2023-01-01 PROCEDURE — 87040 BLOOD CULTURE FOR BACTERIA: CPT

## 2023-01-01 PROCEDURE — 73502 X-RAY EXAM HIP UNI 2-3 VIEWS: CPT

## 2023-01-01 PROCEDURE — 93971 EXTREMITY STUDY: CPT | Mod: 26,LT

## 2023-01-01 PROCEDURE — 85730 THROMBOPLASTIN TIME PARTIAL: CPT

## 2023-01-01 PROCEDURE — C1776: CPT

## 2023-01-01 PROCEDURE — 93306 TTE W/DOPPLER COMPLETE: CPT | Mod: 26

## 2023-01-01 PROCEDURE — 86850 RBC ANTIBODY SCREEN: CPT

## 2023-01-01 PROCEDURE — 71275 CT ANGIOGRAPHY CHEST: CPT | Mod: MG

## 2023-01-01 PROCEDURE — 99223 1ST HOSP IP/OBS HIGH 75: CPT

## 2023-01-01 PROCEDURE — 88360 TUMOR IMMUNOHISTOCHEM/MANUAL: CPT | Mod: 26,59

## 2023-01-01 PROCEDURE — 85027 COMPLETE CBC AUTOMATED: CPT

## 2023-01-01 PROCEDURE — 82947 ASSAY GLUCOSE BLOOD QUANT: CPT

## 2023-01-01 PROCEDURE — 70450 CT HEAD/BRAIN W/O DYE: CPT | Mod: MA

## 2023-01-01 PROCEDURE — 88173 CYTOPATH EVAL FNA REPORT: CPT

## 2023-01-01 PROCEDURE — 82435 ASSAY OF BLOOD CHLORIDE: CPT

## 2023-01-01 PROCEDURE — 38505 NEEDLE BIOPSY LYMPH NODES: CPT | Mod: LT

## 2023-01-01 PROCEDURE — 87640 STAPH A DNA AMP PROBE: CPT

## 2023-01-01 PROCEDURE — 83615 LACTATE (LD) (LDH) ENZYME: CPT

## 2023-01-01 PROCEDURE — 99442: CPT | Mod: 95

## 2023-01-01 PROCEDURE — 88311 DECALCIFY TISSUE: CPT | Mod: 26

## 2023-01-01 PROCEDURE — 97530 THERAPEUTIC ACTIVITIES: CPT

## 2023-01-01 PROCEDURE — 70486 CT MAXILLOFACIAL W/O DYE: CPT | Mod: MA

## 2023-01-01 PROCEDURE — 76942 ECHO GUIDE FOR BIOPSY: CPT

## 2023-01-01 PROCEDURE — 88184 FLOWCYTOMETRY/ TC 1 MARKER: CPT

## 2023-01-01 PROCEDURE — 88341 IMHCHEM/IMCYTCHM EA ADD ANTB: CPT | Mod: 26,59

## 2023-01-01 PROCEDURE — 85018 HEMOGLOBIN: CPT

## 2023-01-01 PROCEDURE — 99214 OFFICE O/P EST MOD 30 MIN: CPT

## 2023-01-01 PROCEDURE — 76942 ECHO GUIDE FOR BIOPSY: CPT | Mod: 26

## 2023-01-01 PROCEDURE — 88305 TISSUE EXAM BY PATHOLOGIST: CPT | Mod: 26

## 2023-01-01 PROCEDURE — 73564 X-RAY EXAM KNEE 4 OR MORE: CPT | Mod: 26,LT

## 2023-01-01 PROCEDURE — 97161 PT EVAL LOW COMPLEX 20 MIN: CPT

## 2023-01-01 PROCEDURE — 93010 ELECTROCARDIOGRAM REPORT: CPT

## 2023-01-01 PROCEDURE — C8929: CPT

## 2023-01-01 PROCEDURE — 71045 X-RAY EXAM CHEST 1 VIEW: CPT | Mod: 26

## 2023-01-01 PROCEDURE — 72170 X-RAY EXAM OF PELVIS: CPT | Mod: 26

## 2023-01-01 PROCEDURE — 88307 TISSUE EXAM BY PATHOLOGIST: CPT

## 2023-01-01 PROCEDURE — 88360 TUMOR IMMUNOHISTOCHEM/MANUAL: CPT

## 2023-01-01 PROCEDURE — 86901 BLOOD TYPING SEROLOGIC RH(D): CPT

## 2023-01-01 PROCEDURE — 76377 3D RENDER W/INTRP POSTPROCES: CPT

## 2023-01-01 PROCEDURE — 83735 ASSAY OF MAGNESIUM: CPT

## 2023-01-01 PROCEDURE — 99222 1ST HOSP IP/OBS MODERATE 55: CPT

## 2023-01-01 PROCEDURE — 88185 FLOWCYTOMETRY/TC ADD-ON: CPT

## 2023-01-01 PROCEDURE — 82550 ASSAY OF CK (CPK): CPT

## 2023-01-01 PROCEDURE — 84132 ASSAY OF SERUM POTASSIUM: CPT

## 2023-01-01 PROCEDURE — 87077 CULTURE AEROBIC IDENTIFY: CPT

## 2023-01-01 PROCEDURE — 99284 EMERGENCY DEPT VISIT MOD MDM: CPT | Mod: 25

## 2023-01-01 PROCEDURE — 87186 SC STD MICRODIL/AGAR DIL: CPT

## 2023-01-01 PROCEDURE — 97535 SELF CARE MNGMENT TRAINING: CPT

## 2023-01-01 PROCEDURE — 83605 ASSAY OF LACTIC ACID: CPT

## 2023-01-01 PROCEDURE — 97110 THERAPEUTIC EXERCISES: CPT

## 2023-01-01 PROCEDURE — U0005: CPT

## 2023-01-01 PROCEDURE — 96375 TX/PRO/DX INJ NEW DRUG ADDON: CPT

## 2023-01-01 PROCEDURE — 87641 MR-STAPH DNA AMP PROBE: CPT

## 2023-01-01 PROCEDURE — 73501 X-RAY EXAM HIP UNI 1 VIEW: CPT | Mod: 26,LT

## 2023-01-01 PROCEDURE — 71275 CT ANGIOGRAPHY CHEST: CPT

## 2023-01-01 PROCEDURE — 84100 ASSAY OF PHOSPHORUS: CPT

## 2023-01-01 PROCEDURE — 73552 X-RAY EXAM OF FEMUR 2/>: CPT

## 2023-01-01 PROCEDURE — 96374 THER/PROPH/DIAG INJ IV PUSH: CPT

## 2023-01-01 PROCEDURE — 70110 X-RAY EXAM OF JAW 4/> VIEWS: CPT

## 2023-01-01 PROCEDURE — 99231 SBSQ HOSP IP/OBS SF/LOW 25: CPT

## 2023-01-01 PROCEDURE — 99239 HOSP IP/OBS DSCHRG MGMT >30: CPT

## 2023-01-01 PROCEDURE — 72192 CT PELVIS W/O DYE: CPT | Mod: 26,MA

## 2023-01-01 PROCEDURE — 74174 CTA ABD&PLVS W/CONTRAST: CPT | Mod: MG

## 2023-01-01 PROCEDURE — 88342 IMHCHEM/IMCYTCHM 1ST ANTB: CPT

## 2023-01-01 PROCEDURE — 88341 IMHCHEM/IMCYTCHM EA ADD ANTB: CPT

## 2023-01-01 PROCEDURE — 81264 IGK REARRANGEABN CLONAL POP: CPT

## 2023-01-01 PROCEDURE — 70450 CT HEAD/BRAIN W/O DYE: CPT | Mod: 26,MA

## 2023-01-01 PROCEDURE — 88305 TISSUE EXAM BY PATHOLOGIST: CPT

## 2023-01-01 PROCEDURE — U0003: CPT

## 2023-01-01 PROCEDURE — 93971 EXTREMITY STUDY: CPT

## 2023-01-01 PROCEDURE — 87205 SMEAR GRAM STAIN: CPT

## 2023-01-01 PROCEDURE — 99443: CPT | Mod: 95,PD

## 2023-01-01 PROCEDURE — C1713: CPT

## 2023-01-01 PROCEDURE — 81261 IGH GENE REARRANGE AMP METH: CPT

## 2023-01-01 PROCEDURE — 71275 CT ANGIOGRAPHY CHEST: CPT | Mod: 26

## 2023-01-01 PROCEDURE — 73552 X-RAY EXAM OF FEMUR 2/>: CPT | Mod: 26,LT

## 2023-01-01 PROCEDURE — 70450 CT HEAD/BRAIN W/O DYE: CPT | Mod: 26,MG

## 2023-01-01 PROCEDURE — 88311 DECALCIFY TISSUE: CPT

## 2023-01-01 PROCEDURE — 71275 CT ANGIOGRAPHY CHEST: CPT | Mod: 26,MG

## 2023-01-01 PROCEDURE — 72170 X-RAY EXAM OF PELVIS: CPT

## 2023-01-01 PROCEDURE — 88307 TISSUE EXAM BY PATHOLOGIST: CPT | Mod: 26

## 2023-01-01 PROCEDURE — 85014 HEMATOCRIT: CPT

## 2023-01-01 PROCEDURE — 88173 CYTOPATH EVAL FNA REPORT: CPT | Mod: 26

## 2023-01-01 PROCEDURE — 73501 X-RAY EXAM HIP UNI 1 VIEW: CPT

## 2023-01-01 DEVICE — IMPLANTABLE DEVICE: Type: IMPLANTABLE DEVICE | Site: LEFT | Status: FUNCTIONAL

## 2023-01-01 DEVICE — STRYKER UNIVERSAL CEMENT RESTRICTOR MEDIUM: Type: IMPLANTABLE DEVICE | Site: LEFT | Status: FUNCTIONAL

## 2023-01-01 DEVICE — CEMENT SIMPLEX WITH TOBRAMYCIN: Type: IMPLANTABLE DEVICE | Site: LEFT | Status: FUNCTIONAL

## 2023-01-01 RX ORDER — ASPIRIN 81 MG
81 TABLET, DELAYED RELEASE (ENTERIC COATED) ORAL
Refills: 0 | Status: ACTIVE | COMMUNITY

## 2023-01-01 RX ORDER — AMLODIPINE BESYLATE 2.5 MG/1
2.5 TABLET ORAL DAILY
Refills: 0 | Status: DISCONTINUED | OUTPATIENT
Start: 2023-01-01 | End: 2023-01-01

## 2023-01-01 RX ORDER — ONDANSETRON 8 MG/1
4 TABLET, FILM COATED ORAL ONCE
Refills: 0 | Status: DISCONTINUED | OUTPATIENT
Start: 2023-01-01 | End: 2023-01-01

## 2023-01-01 RX ORDER — FLUOXETINE HCL 10 MG
0 CAPSULE ORAL
Qty: 0 | Refills: 0 | DISCHARGE

## 2023-01-01 RX ORDER — FUROSEMIDE 40 MG
40 TABLET ORAL ONCE
Refills: 0 | Status: COMPLETED | OUTPATIENT
Start: 2023-01-01 | End: 2023-01-01

## 2023-01-01 RX ORDER — POLYETHYLENE GLYCOL 3350 17 G/17G
17 POWDER, FOR SOLUTION ORAL
Qty: 0 | Refills: 0 | DISCHARGE
Start: 2023-01-01

## 2023-01-01 RX ORDER — ESCITALOPRAM OXALATE 10 MG/1
5 TABLET, FILM COATED ORAL DAILY
Refills: 0 | Status: DISCONTINUED | OUTPATIENT
Start: 2023-01-01 | End: 2023-01-01

## 2023-01-01 RX ORDER — FUROSEMIDE 40 MG
40 TABLET ORAL DAILY
Refills: 0 | Status: DISCONTINUED | OUTPATIENT
Start: 2023-01-01 | End: 2023-01-01

## 2023-01-01 RX ORDER — OXYCODONE HYDROCHLORIDE 5 MG/1
5 TABLET ORAL EVERY 6 HOURS
Refills: 0 | Status: DISCONTINUED | OUTPATIENT
Start: 2023-01-01 | End: 2023-01-01

## 2023-01-01 RX ORDER — LANOLIN ALCOHOL/MO/W.PET/CERES
3 CREAM (GRAM) TOPICAL AT BEDTIME
Refills: 0 | Status: DISCONTINUED | OUTPATIENT
Start: 2023-01-01 | End: 2023-01-01

## 2023-01-01 RX ORDER — METOPROLOL TARTRATE 50 MG
25 TABLET ORAL DAILY
Refills: 0 | Status: DISCONTINUED | OUTPATIENT
Start: 2023-01-01 | End: 2023-01-01

## 2023-01-01 RX ORDER — POTASSIUM CHLORIDE 20 MEQ
40 PACKET (EA) ORAL EVERY 4 HOURS
Refills: 0 | Status: DISCONTINUED | OUTPATIENT
Start: 2023-01-01 | End: 2023-01-01

## 2023-01-01 RX ORDER — SACUBITRIL AND VALSARTAN 24; 26 MG/1; MG/1
1 TABLET, FILM COATED ORAL
Qty: 0 | Refills: 0 | DISCHARGE
Start: 2023-01-01

## 2023-01-01 RX ORDER — ESCITALOPRAM OXALATE 10 MG/1
1 TABLET, FILM COATED ORAL
Qty: 0 | Refills: 0 | DISCHARGE
Start: 2023-01-01

## 2023-01-01 RX ORDER — SODIUM CHLORIDE 9 MG/ML
1000 INJECTION INTRAMUSCULAR; INTRAVENOUS; SUBCUTANEOUS
Refills: 0 | Status: DISCONTINUED | OUTPATIENT
Start: 2023-01-01 | End: 2023-01-01

## 2023-01-01 RX ORDER — ESCITALOPRAM OXALATE 10 MG/1
10 TABLET ORAL
Qty: 90 | Refills: 0 | Status: DISCONTINUED | COMMUNITY
Start: 2022-01-01 | End: 2023-01-01

## 2023-01-01 RX ORDER — POTASSIUM CHLORIDE 20 MEQ
40 PACKET (EA) ORAL ONCE
Refills: 0 | Status: COMPLETED | OUTPATIENT
Start: 2023-01-01 | End: 2023-01-01

## 2023-01-01 RX ORDER — TRAMADOL HYDROCHLORIDE 50 MG/1
50 TABLET ORAL EVERY 6 HOURS
Refills: 0 | Status: DISCONTINUED | OUTPATIENT
Start: 2023-01-01 | End: 2023-01-01

## 2023-01-01 RX ORDER — SENNA PLUS 8.6 MG/1
2 TABLET ORAL
Qty: 0 | Refills: 0 | DISCHARGE
Start: 2023-01-01

## 2023-01-01 RX ORDER — FLUOXETINE HCL 10 MG
5 CAPSULE ORAL DAILY
Refills: 0 | Status: DISCONTINUED | OUTPATIENT
Start: 2023-01-01 | End: 2023-01-01

## 2023-01-01 RX ORDER — KETOROLAC TROMETHAMINE 30 MG/ML
15 SYRINGE (ML) INJECTION ONCE
Refills: 0 | Status: DISCONTINUED | OUTPATIENT
Start: 2023-01-01 | End: 2023-01-01

## 2023-01-01 RX ORDER — FLUOXETINE HCL 10 MG
5 CAPSULE ORAL
Qty: 0 | Refills: 0 | DISCHARGE

## 2023-01-01 RX ORDER — CHLORHEXIDINE GLUCONATE 213 G/1000ML
1 SOLUTION TOPICAL
Refills: 0 | Status: DISCONTINUED | OUTPATIENT
Start: 2023-01-01 | End: 2023-01-01

## 2023-01-01 RX ORDER — DICYCLOMINE HYDROCHLORIDE 10 MG/1
10 CAPSULE ORAL
Qty: 20 | Refills: 0 | Status: ACTIVE | COMMUNITY
Start: 2023-01-01 | End: 1900-01-01

## 2023-01-01 RX ORDER — AMLODIPINE BESYLATE 2.5 MG/1
2.5 TABLET ORAL
Qty: 90 | Refills: 0 | Status: DISCONTINUED | COMMUNITY
Start: 2022-01-01 | End: 2023-01-01

## 2023-01-01 RX ORDER — METOPROLOL TARTRATE 50 MG
1 TABLET ORAL
Qty: 0 | Refills: 0 | DISCHARGE

## 2023-01-01 RX ORDER — FUROSEMIDE 40 MG
40 TABLET ORAL
Refills: 0 | Status: DISCONTINUED | OUTPATIENT
Start: 2023-01-01 | End: 2023-01-01

## 2023-01-01 RX ORDER — ACETAMINOPHEN 500 MG
975 TABLET ORAL EVERY 8 HOURS
Refills: 0 | Status: DISCONTINUED | OUTPATIENT
Start: 2023-01-01 | End: 2023-01-01

## 2023-01-01 RX ORDER — ACETAMINOPHEN 500 MG
650 TABLET ORAL ONCE
Refills: 0 | Status: COMPLETED | OUTPATIENT
Start: 2023-01-01 | End: 2023-01-01

## 2023-01-01 RX ORDER — ESCITALOPRAM OXALATE 5 MG/1
5 TABLET ORAL
Refills: 0 | Status: ACTIVE | COMMUNITY

## 2023-01-01 RX ORDER — ATORVASTATIN CALCIUM 80 MG/1
1 TABLET, FILM COATED ORAL
Qty: 0 | Refills: 0 | DISCHARGE
Start: 2023-01-01

## 2023-01-01 RX ORDER — MAGNESIUM SULFATE 500 MG/ML
2 VIAL (ML) INJECTION ONCE
Refills: 0 | Status: COMPLETED | OUTPATIENT
Start: 2023-01-01 | End: 2023-01-01

## 2023-01-01 RX ORDER — RIVAROXABAN 15 MG-20MG
1 KIT ORAL
Qty: 0 | Refills: 0 | DISCHARGE

## 2023-01-01 RX ORDER — RIVAROXABAN 20 MG/1
20 TABLET, FILM COATED ORAL DAILY
Qty: 30 | Refills: 4 | Status: DISCONTINUED | COMMUNITY
Start: 2022-01-01 | End: 2023-01-01

## 2023-01-01 RX ORDER — CEFTRIAXONE 500 MG/1
INJECTION, POWDER, FOR SOLUTION INTRAMUSCULAR; INTRAVENOUS
Refills: 0 | Status: DISCONTINUED | OUTPATIENT
Start: 2023-01-01 | End: 2023-01-01

## 2023-01-01 RX ORDER — MORPHINE SULFATE 50 MG/1
2 CAPSULE, EXTENDED RELEASE ORAL ONCE
Refills: 0 | Status: DISCONTINUED | OUTPATIENT
Start: 2023-01-01 | End: 2023-01-01

## 2023-01-01 RX ORDER — SODIUM CHLORIDE 9 MG/ML
500 INJECTION INTRAMUSCULAR; INTRAVENOUS; SUBCUTANEOUS ONCE
Refills: 0 | Status: COMPLETED | OUTPATIENT
Start: 2023-01-01 | End: 2023-01-01

## 2023-01-01 RX ORDER — POTASSIUM CHLORIDE 20 MEQ
40 PACKET (EA) ORAL EVERY 4 HOURS
Refills: 0 | Status: COMPLETED | OUTPATIENT
Start: 2023-01-01 | End: 2023-01-01

## 2023-01-01 RX ORDER — SODIUM CHLORIDE 9 MG/ML
500 INJECTION, SOLUTION INTRAVENOUS
Refills: 0 | Status: DISCONTINUED | OUTPATIENT
Start: 2023-01-01 | End: 2023-01-01

## 2023-01-01 RX ORDER — HYDROMORPHONE HYDROCHLORIDE 2 MG/ML
0.25 INJECTION INTRAMUSCULAR; INTRAVENOUS; SUBCUTANEOUS
Refills: 0 | Status: DISCONTINUED | OUTPATIENT
Start: 2023-01-01 | End: 2023-01-01

## 2023-01-01 RX ORDER — ACETAMINOPHEN 500 MG
1000 TABLET ORAL ONCE
Refills: 0 | Status: DISCONTINUED | OUTPATIENT
Start: 2023-01-01 | End: 2023-01-01

## 2023-01-01 RX ORDER — OXYCODONE HYDROCHLORIDE 5 MG/1
1 TABLET ORAL
Qty: 0 | Refills: 0 | DISCHARGE
Start: 2023-01-01

## 2023-01-01 RX ORDER — ASCORBIC ACID 60 MG
1 TABLET,CHEWABLE ORAL
Qty: 30 | Refills: 0
Start: 2023-01-01 | End: 2023-06-08

## 2023-01-01 RX ORDER — ESCITALOPRAM OXALATE 5 MG/1
5 TABLET ORAL
Qty: 90 | Refills: 2 | Status: DISCONTINUED | COMMUNITY
Start: 2022-01-01 | End: 2023-01-01

## 2023-01-01 RX ORDER — METOPROLOL TARTRATE 50 MG
1 TABLET ORAL
Qty: 0 | Refills: 0 | DISCHARGE
Start: 2023-01-01

## 2023-01-01 RX ORDER — ACETAMINOPHEN 500 MG
1000 TABLET ORAL ONCE
Refills: 0 | Status: COMPLETED | OUTPATIENT
Start: 2023-01-01 | End: 2023-01-01

## 2023-01-01 RX ORDER — ASCORBIC ACID 60 MG
500 TABLET,CHEWABLE ORAL DAILY
Refills: 0 | Status: DISCONTINUED | OUTPATIENT
Start: 2023-01-01 | End: 2023-01-01

## 2023-01-01 RX ORDER — METOPROLOL SUCCINATE 25 MG/1
25 TABLET, EXTENDED RELEASE ORAL
Qty: 90 | Refills: 1 | Status: ACTIVE | COMMUNITY
Start: 2021-12-06 | End: 1900-01-01

## 2023-01-01 RX ORDER — POLYETHYLENE GLYCOL 3350 17 G/17G
17 POWDER, FOR SOLUTION ORAL ONCE
Refills: 0 | Status: COMPLETED | OUTPATIENT
Start: 2023-01-01 | End: 2023-01-01

## 2023-01-01 RX ORDER — POTASSIUM CHLORIDE 20 MEQ
10 PACKET (EA) ORAL ONCE
Refills: 0 | Status: COMPLETED | OUTPATIENT
Start: 2023-01-01 | End: 2023-01-01

## 2023-01-01 RX ORDER — SACUBITRIL AND VALSARTAN 24; 26 MG/1; MG/1
24-26 TABLET, FILM COATED ORAL TWICE DAILY
Qty: 60 | Refills: 3 | Status: ACTIVE | COMMUNITY
Start: 2023-01-01 | End: 1900-01-01

## 2023-01-01 RX ORDER — NITROFURANTOIN (MONOHYDRATE/MACROCRYSTALS) 25; 75 MG/1; MG/1
100 CAPSULE ORAL
Qty: 14 | Refills: 0 | Status: DISCONTINUED | COMMUNITY
Start: 2023-01-01 | End: 2023-01-01

## 2023-01-01 RX ORDER — ENOXAPARIN SODIUM 100 MG/ML
30 INJECTION SUBCUTANEOUS EVERY 24 HOURS
Refills: 0 | Status: DISCONTINUED | OUTPATIENT
Start: 2023-01-01 | End: 2023-01-01

## 2023-01-01 RX ORDER — MECLIZINE HYDROCHLORIDE 12.5 MG/1
12.5 TABLET ORAL
Qty: 30 | Refills: 0 | Status: DISCONTINUED | COMMUNITY
Start: 2022-01-01 | End: 2023-01-01

## 2023-01-01 RX ORDER — ASPIRIN/CALCIUM CARB/MAGNESIUM 324 MG
81 TABLET ORAL DAILY
Refills: 0 | Status: DISCONTINUED | OUTPATIENT
Start: 2023-01-01 | End: 2023-01-01

## 2023-01-01 RX ORDER — SODIUM CHLORIDE 9 MG/ML
500 INJECTION, SOLUTION INTRAVENOUS
Refills: 0 | Status: COMPLETED | OUTPATIENT
Start: 2023-01-01 | End: 2023-01-01

## 2023-01-01 RX ORDER — SACUBITRIL AND VALSARTAN 24; 26 MG/1; MG/1
1 TABLET, FILM COATED ORAL
Refills: 0 | Status: DISCONTINUED | OUTPATIENT
Start: 2023-01-01 | End: 2023-01-01

## 2023-01-01 RX ORDER — RIVAROXABAN 20 MG/1
20 TABLET, FILM COATED ORAL
Qty: 30 | Refills: 4 | Status: DISCONTINUED | COMMUNITY
Start: 2023-01-01 | End: 2023-01-01

## 2023-01-01 RX ORDER — POTASSIUM CHLORIDE 20 MEQ
10 PACKET (EA) ORAL
Refills: 0 | Status: DISCONTINUED | OUTPATIENT
Start: 2023-01-01 | End: 2023-01-01

## 2023-01-01 RX ORDER — FUROSEMIDE 40 MG
1 TABLET ORAL
Qty: 13 | Refills: 0
Start: 2023-01-01 | End: 2023-06-08

## 2023-01-01 RX ORDER — ONDANSETRON 8 MG/1
4 TABLET, FILM COATED ORAL EVERY 8 HOURS
Refills: 0 | Status: DISCONTINUED | OUTPATIENT
Start: 2023-01-01 | End: 2023-01-01

## 2023-01-01 RX ORDER — ESCITALOPRAM OXALATE 10 MG/1
1 TABLET, FILM COATED ORAL
Qty: 0 | Refills: 0 | DISCHARGE

## 2023-01-01 RX ORDER — SENNA PLUS 8.6 MG/1
2 TABLET ORAL AT BEDTIME
Refills: 0 | Status: DISCONTINUED | OUTPATIENT
Start: 2023-01-01 | End: 2023-01-01

## 2023-01-01 RX ORDER — HYDROMORPHONE HYDROCHLORIDE 2 MG/ML
0.5 INJECTION INTRAMUSCULAR; INTRAVENOUS; SUBCUTANEOUS
Refills: 0 | Status: DISCONTINUED | OUTPATIENT
Start: 2023-01-01 | End: 2023-01-01

## 2023-01-01 RX ORDER — ATORVASTATIN CALCIUM 80 MG/1
40 TABLET, FILM COATED ORAL AT BEDTIME
Refills: 0 | Status: DISCONTINUED | OUTPATIENT
Start: 2023-01-01 | End: 2023-01-01

## 2023-01-01 RX ORDER — ENOXAPARIN SODIUM 100 MG/ML
40 INJECTION SUBCUTANEOUS EVERY 24 HOURS
Refills: 0 | Status: DISCONTINUED | OUTPATIENT
Start: 2023-01-01 | End: 2023-01-01

## 2023-01-01 RX ORDER — PHENYLEPHRINE-SHARK LIVER OIL-MINERAL OIL-PETROLATUM RECTAL OINTMENT
1 OINTMENT (GRAM) RECTAL DAILY
Refills: 0 | Status: DISCONTINUED | OUTPATIENT
Start: 2023-01-01 | End: 2023-01-01

## 2023-01-01 RX ORDER — CEFAZOLIN SODIUM 1 G
2000 VIAL (EA) INJECTION EVERY 8 HOURS
Refills: 0 | Status: COMPLETED | OUTPATIENT
Start: 2023-01-01 | End: 2023-01-01

## 2023-01-01 RX ORDER — LORAZEPAM 0.5 MG/1
0.5 TABLET ORAL
Qty: 20 | Refills: 0 | Status: ACTIVE | COMMUNITY
Start: 2023-01-01 | End: 1900-01-01

## 2023-01-01 RX ORDER — ATORVASTATIN CALCIUM 80 MG/1
1 TABLET, FILM COATED ORAL
Qty: 30 | Refills: 0
Start: 2023-01-01 | End: 2023-06-08

## 2023-01-01 RX ORDER — ONDANSETRON 8 MG/1
4 TABLET, FILM COATED ORAL ONCE
Refills: 0 | Status: COMPLETED | OUTPATIENT
Start: 2023-01-01 | End: 2023-01-01

## 2023-01-01 RX ORDER — CEFTRIAXONE 500 MG/1
1000 INJECTION, POWDER, FOR SOLUTION INTRAMUSCULAR; INTRAVENOUS EVERY 24 HOURS
Refills: 0 | Status: DISCONTINUED | OUTPATIENT
Start: 2023-01-01 | End: 2023-01-01

## 2023-01-01 RX ORDER — CEFTRIAXONE 500 MG/1
1000 INJECTION, POWDER, FOR SOLUTION INTRAMUSCULAR; INTRAVENOUS ONCE
Refills: 0 | Status: COMPLETED | OUTPATIENT
Start: 2023-01-01 | End: 2023-01-01

## 2023-01-01 RX ORDER — RIVAROXABAN 15 MG-20MG
20 KIT ORAL
Refills: 0 | Status: DISCONTINUED | OUTPATIENT
Start: 2023-01-01 | End: 2023-01-01

## 2023-01-01 RX ORDER — POLYETHYLENE GLYCOL 3350 17 G/17G
17 POWDER, FOR SOLUTION ORAL DAILY
Refills: 0 | Status: DISCONTINUED | OUTPATIENT
Start: 2023-01-01 | End: 2023-01-01

## 2023-01-01 RX ORDER — MECLIZINE HCL 12.5 MG
1 TABLET ORAL
Qty: 0 | Refills: 0 | DISCHARGE

## 2023-01-01 RX ORDER — OXYCODONE HYDROCHLORIDE 5 MG/1
2.5 TABLET ORAL EVERY 4 HOURS
Refills: 0 | Status: DISCONTINUED | OUTPATIENT
Start: 2023-01-01 | End: 2023-01-01

## 2023-01-01 RX ORDER — POTASSIUM CHLORIDE 20 MEQ
40 PACKET (EA) ORAL ONCE
Refills: 0 | Status: DISCONTINUED | OUTPATIENT
Start: 2023-01-01 | End: 2023-01-01

## 2023-01-01 RX ORDER — LOSARTAN POTASSIUM 100 MG/1
25 TABLET, FILM COATED ORAL DAILY
Refills: 0 | Status: DISCONTINUED | OUTPATIENT
Start: 2023-01-01 | End: 2023-01-01

## 2023-01-01 RX ORDER — CEFTRIAXONE 500 MG/1
1000 INJECTION, POWDER, FOR SOLUTION INTRAMUSCULAR; INTRAVENOUS EVERY 24 HOURS
Refills: 0 | Status: CANCELLED | OUTPATIENT
Start: 2023-01-01 | End: 2023-01-01

## 2023-01-01 RX ORDER — TRAMADOL HYDROCHLORIDE 50 MG/1
1 TABLET ORAL
Qty: 0 | Refills: 0 | DISCHARGE
Start: 2023-01-01

## 2023-01-01 RX ORDER — RIVAROXABAN 15 MG-20MG
1 KIT ORAL
Qty: 0 | Refills: 0 | DISCHARGE
Start: 2023-01-01

## 2023-01-01 RX ORDER — ACETAMINOPHEN 500 MG
975 TABLET ORAL EVERY 8 HOURS
Refills: 0 | Status: COMPLETED | OUTPATIENT
Start: 2023-01-01 | End: 2023-01-01

## 2023-01-01 RX ORDER — ASPIRIN/CALCIUM CARB/MAGNESIUM 324 MG
1 TABLET ORAL
Qty: 0 | Refills: 0 | DISCHARGE
Start: 2023-01-01

## 2023-01-01 RX ORDER — ERGOCALCIFEROL 1.25 MG/1
0 CAPSULE ORAL
Qty: 0 | Refills: 0 | DISCHARGE

## 2023-01-01 RX ADMIN — Medication 975 MILLIGRAM(S): at 13:39

## 2023-01-01 RX ADMIN — CHLORHEXIDINE GLUCONATE 1 APPLICATION(S): 213 SOLUTION TOPICAL at 05:03

## 2023-01-01 RX ADMIN — OXYCODONE HYDROCHLORIDE 5 MILLIGRAM(S): 5 TABLET ORAL at 00:03

## 2023-01-01 RX ADMIN — Medication 975 MILLIGRAM(S): at 06:30

## 2023-01-01 RX ADMIN — Medication 40 MILLIGRAM(S): at 05:32

## 2023-01-01 RX ADMIN — OXYCODONE HYDROCHLORIDE 5 MILLIGRAM(S): 5 TABLET ORAL at 22:29

## 2023-01-01 RX ADMIN — Medication 975 MILLIGRAM(S): at 13:45

## 2023-01-01 RX ADMIN — Medication 40 MILLIGRAM(S): at 06:24

## 2023-01-01 RX ADMIN — Medication 975 MILLIGRAM(S): at 05:30

## 2023-01-01 RX ADMIN — OXYCODONE HYDROCHLORIDE 5 MILLIGRAM(S): 5 TABLET ORAL at 22:30

## 2023-01-01 RX ADMIN — LOSARTAN POTASSIUM 25 MILLIGRAM(S): 100 TABLET, FILM COATED ORAL at 12:07

## 2023-01-01 RX ADMIN — Medication 1 MILLIGRAM(S): at 18:56

## 2023-01-01 RX ADMIN — ESCITALOPRAM OXALATE 5 MILLIGRAM(S): 10 TABLET, FILM COATED ORAL at 11:13

## 2023-01-01 RX ADMIN — Medication 975 MILLIGRAM(S): at 21:57

## 2023-01-01 RX ADMIN — Medication 500 MILLIGRAM(S): at 13:26

## 2023-01-01 RX ADMIN — OXYCODONE HYDROCHLORIDE 5 MILLIGRAM(S): 5 TABLET ORAL at 15:42

## 2023-01-01 RX ADMIN — Medication 975 MILLIGRAM(S): at 13:52

## 2023-01-01 RX ADMIN — Medication 975 MILLIGRAM(S): at 22:25

## 2023-01-01 RX ADMIN — Medication 500 MILLIGRAM(S): at 11:06

## 2023-01-01 RX ADMIN — Medication 1 TABLET(S): at 13:24

## 2023-01-01 RX ADMIN — AMLODIPINE BESYLATE 2.5 MILLIGRAM(S): 2.5 TABLET ORAL at 11:49

## 2023-01-01 RX ADMIN — OXYCODONE HYDROCHLORIDE 5 MILLIGRAM(S): 5 TABLET ORAL at 23:33

## 2023-01-01 RX ADMIN — Medication 40 MILLIEQUIVALENT(S): at 11:00

## 2023-01-01 RX ADMIN — SACUBITRIL AND VALSARTAN 1 TABLET(S): 24; 26 TABLET, FILM COATED ORAL at 20:24

## 2023-01-01 RX ADMIN — POLYETHYLENE GLYCOL 3350 17 GRAM(S): 17 POWDER, FOR SOLUTION ORAL at 11:13

## 2023-01-01 RX ADMIN — OXYCODONE HYDROCHLORIDE 5 MILLIGRAM(S): 5 TABLET ORAL at 20:40

## 2023-01-01 RX ADMIN — Medication 975 MILLIGRAM(S): at 21:02

## 2023-01-01 RX ADMIN — Medication 975 MILLIGRAM(S): at 22:43

## 2023-01-01 RX ADMIN — ONDANSETRON 4 MILLIGRAM(S): 8 TABLET, FILM COATED ORAL at 07:47

## 2023-01-01 RX ADMIN — Medication 975 MILLIGRAM(S): at 13:03

## 2023-01-01 RX ADMIN — TRAMADOL HYDROCHLORIDE 50 MILLIGRAM(S): 50 TABLET ORAL at 03:30

## 2023-01-01 RX ADMIN — ENOXAPARIN SODIUM 40 MILLIGRAM(S): 100 INJECTION SUBCUTANEOUS at 11:49

## 2023-01-01 RX ADMIN — OXYCODONE HYDROCHLORIDE 5 MILLIGRAM(S): 5 TABLET ORAL at 06:09

## 2023-01-01 RX ADMIN — OXYCODONE HYDROCHLORIDE 5 MILLIGRAM(S): 5 TABLET ORAL at 06:30

## 2023-01-01 RX ADMIN — CEFTRIAXONE 100 MILLIGRAM(S): 500 INJECTION, POWDER, FOR SOLUTION INTRAMUSCULAR; INTRAVENOUS at 18:01

## 2023-01-01 RX ADMIN — Medication 500 MILLIGRAM(S): at 12:59

## 2023-01-01 RX ADMIN — ESCITALOPRAM OXALATE 5 MILLIGRAM(S): 10 TABLET, FILM COATED ORAL at 12:59

## 2023-01-01 RX ADMIN — Medication 40 MILLIEQUIVALENT(S): at 15:55

## 2023-01-01 RX ADMIN — Medication 500 MILLIGRAM(S): at 13:01

## 2023-01-01 RX ADMIN — Medication 975 MILLIGRAM(S): at 06:10

## 2023-01-01 RX ADMIN — Medication 975 MILLIGRAM(S): at 21:22

## 2023-01-01 RX ADMIN — SACUBITRIL AND VALSARTAN 1 TABLET(S): 24; 26 TABLET, FILM COATED ORAL at 05:17

## 2023-01-01 RX ADMIN — Medication 81 MILLIGRAM(S): at 13:01

## 2023-01-01 RX ADMIN — Medication 100 MILLIGRAM(S): at 05:58

## 2023-01-01 RX ADMIN — Medication 81 MILLIGRAM(S): at 11:03

## 2023-01-01 RX ADMIN — ESCITALOPRAM OXALATE 5 MILLIGRAM(S): 10 TABLET, FILM COATED ORAL at 12:30

## 2023-01-01 RX ADMIN — Medication 1 TABLET(S): at 11:03

## 2023-01-01 RX ADMIN — Medication 975 MILLIGRAM(S): at 21:55

## 2023-01-01 RX ADMIN — POLYETHYLENE GLYCOL 3350 17 GRAM(S): 17 POWDER, FOR SOLUTION ORAL at 12:07

## 2023-01-01 RX ADMIN — Medication 975 MILLIGRAM(S): at 22:33

## 2023-01-01 RX ADMIN — Medication 40 MILLIEQUIVALENT(S): at 17:32

## 2023-01-01 RX ADMIN — Medication 975 MILLIGRAM(S): at 05:03

## 2023-01-01 RX ADMIN — RIVAROXABAN 20 MILLIGRAM(S): KIT at 21:55

## 2023-01-01 RX ADMIN — ATORVASTATIN CALCIUM 40 MILLIGRAM(S): 80 TABLET, FILM COATED ORAL at 22:04

## 2023-01-01 RX ADMIN — Medication 25 GRAM(S): at 21:47

## 2023-01-01 RX ADMIN — Medication 81 MILLIGRAM(S): at 12:17

## 2023-01-01 RX ADMIN — POLYETHYLENE GLYCOL 3350 17 GRAM(S): 17 POWDER, FOR SOLUTION ORAL at 13:26

## 2023-01-01 RX ADMIN — TRAMADOL HYDROCHLORIDE 50 MILLIGRAM(S): 50 TABLET ORAL at 03:00

## 2023-01-01 RX ADMIN — Medication 100 MILLIEQUIVALENT(S): at 10:57

## 2023-01-01 RX ADMIN — SENNA PLUS 2 TABLET(S): 8.6 TABLET ORAL at 21:48

## 2023-01-01 RX ADMIN — SACUBITRIL AND VALSARTAN 1 TABLET(S): 24; 26 TABLET, FILM COATED ORAL at 05:03

## 2023-01-01 RX ADMIN — OXYCODONE HYDROCHLORIDE 5 MILLIGRAM(S): 5 TABLET ORAL at 15:32

## 2023-01-01 RX ADMIN — Medication 25 MILLIGRAM(S): at 05:25

## 2023-01-01 RX ADMIN — OXYCODONE HYDROCHLORIDE 5 MILLIGRAM(S): 5 TABLET ORAL at 09:32

## 2023-01-01 RX ADMIN — Medication 975 MILLIGRAM(S): at 22:00

## 2023-01-01 RX ADMIN — OXYCODONE HYDROCHLORIDE 5 MILLIGRAM(S): 5 TABLET ORAL at 21:00

## 2023-01-01 RX ADMIN — Medication 1 TABLET(S): at 17:04

## 2023-01-01 RX ADMIN — SENNA PLUS 2 TABLET(S): 8.6 TABLET ORAL at 21:03

## 2023-01-01 RX ADMIN — SODIUM CHLORIDE 500 MILLILITER(S): 9 INJECTION INTRAMUSCULAR; INTRAVENOUS; SUBCUTANEOUS at 09:53

## 2023-01-01 RX ADMIN — ATORVASTATIN CALCIUM 40 MILLIGRAM(S): 80 TABLET, FILM COATED ORAL at 21:02

## 2023-01-01 RX ADMIN — Medication 40 MILLIEQUIVALENT(S): at 02:36

## 2023-01-01 RX ADMIN — CHLORHEXIDINE GLUCONATE 1 APPLICATION(S): 213 SOLUTION TOPICAL at 06:13

## 2023-01-01 RX ADMIN — CHLORHEXIDINE GLUCONATE 1 APPLICATION(S): 213 SOLUTION TOPICAL at 05:25

## 2023-01-01 RX ADMIN — Medication 25 MILLIGRAM(S): at 05:26

## 2023-01-01 RX ADMIN — Medication 25 MILLIGRAM(S): at 05:29

## 2023-01-01 RX ADMIN — Medication 40 MILLIEQUIVALENT(S): at 14:14

## 2023-01-01 RX ADMIN — MORPHINE SULFATE 2 MILLIGRAM(S): 50 CAPSULE, EXTENDED RELEASE ORAL at 07:47

## 2023-01-01 RX ADMIN — OXYCODONE HYDROCHLORIDE 5 MILLIGRAM(S): 5 TABLET ORAL at 21:22

## 2023-01-01 RX ADMIN — Medication 40 MILLIEQUIVALENT(S): at 21:48

## 2023-01-01 RX ADMIN — CEFTRIAXONE 100 MILLIGRAM(S): 500 INJECTION, POWDER, FOR SOLUTION INTRAMUSCULAR; INTRAVENOUS at 10:41

## 2023-01-01 RX ADMIN — Medication 975 MILLIGRAM(S): at 06:28

## 2023-01-01 RX ADMIN — OXYCODONE HYDROCHLORIDE 2.5 MILLIGRAM(S): 5 TABLET ORAL at 09:08

## 2023-01-01 RX ADMIN — LOSARTAN POTASSIUM 25 MILLIGRAM(S): 100 TABLET, FILM COATED ORAL at 05:41

## 2023-01-01 RX ADMIN — Medication 1 MILLIGRAM(S): at 23:35

## 2023-01-01 RX ADMIN — Medication 25 MILLIGRAM(S): at 11:49

## 2023-01-01 RX ADMIN — PHENYLEPHRINE-SHARK LIVER OIL-MINERAL OIL-PETROLATUM RECTAL OINTMENT 1 APPLICATION(S): at 22:03

## 2023-01-01 RX ADMIN — OXYCODONE HYDROCHLORIDE 5 MILLIGRAM(S): 5 TABLET ORAL at 20:10

## 2023-01-01 RX ADMIN — Medication 5 MILLIGRAM(S): at 11:49

## 2023-01-01 RX ADMIN — Medication 25 MILLIGRAM(S): at 05:32

## 2023-01-01 RX ADMIN — Medication 975 MILLIGRAM(S): at 13:08

## 2023-01-01 RX ADMIN — Medication 975 MILLIGRAM(S): at 23:34

## 2023-01-01 RX ADMIN — ESCITALOPRAM OXALATE 5 MILLIGRAM(S): 10 TABLET, FILM COATED ORAL at 13:01

## 2023-01-01 RX ADMIN — Medication 40 MILLIEQUIVALENT(S): at 13:43

## 2023-01-01 RX ADMIN — SACUBITRIL AND VALSARTAN 1 TABLET(S): 24; 26 TABLET, FILM COATED ORAL at 05:19

## 2023-01-01 RX ADMIN — SACUBITRIL AND VALSARTAN 1 TABLET(S): 24; 26 TABLET, FILM COATED ORAL at 17:31

## 2023-01-01 RX ADMIN — Medication 1 TABLET(S): at 12:59

## 2023-01-01 RX ADMIN — Medication 0.5 MILLIGRAM(S): at 18:42

## 2023-01-01 RX ADMIN — Medication 40 MILLIGRAM(S): at 05:26

## 2023-01-01 RX ADMIN — Medication 975 MILLIGRAM(S): at 14:07

## 2023-01-01 RX ADMIN — Medication 5 MILLIGRAM(S): at 12:10

## 2023-01-01 RX ADMIN — OXYCODONE HYDROCHLORIDE 5 MILLIGRAM(S): 5 TABLET ORAL at 14:32

## 2023-01-01 RX ADMIN — Medication 81 MILLIGRAM(S): at 11:06

## 2023-01-01 RX ADMIN — CHLORHEXIDINE GLUCONATE 1 APPLICATION(S): 213 SOLUTION TOPICAL at 05:47

## 2023-01-01 RX ADMIN — ATORVASTATIN CALCIUM 40 MILLIGRAM(S): 80 TABLET, FILM COATED ORAL at 21:57

## 2023-01-01 RX ADMIN — SACUBITRIL AND VALSARTAN 1 TABLET(S): 24; 26 TABLET, FILM COATED ORAL at 06:13

## 2023-01-01 RX ADMIN — ENOXAPARIN SODIUM 30 MILLIGRAM(S): 100 INJECTION SUBCUTANEOUS at 12:17

## 2023-01-01 RX ADMIN — Medication 25 MILLIGRAM(S): at 05:17

## 2023-01-01 RX ADMIN — Medication 40 MILLIEQUIVALENT(S): at 21:12

## 2023-01-01 RX ADMIN — Medication 975 MILLIGRAM(S): at 05:43

## 2023-01-01 RX ADMIN — Medication 25 MILLIGRAM(S): at 05:04

## 2023-01-01 RX ADMIN — Medication 100 MILLIEQUIVALENT(S): at 12:10

## 2023-01-01 RX ADMIN — Medication 975 MILLIGRAM(S): at 15:32

## 2023-01-01 RX ADMIN — Medication 400 MILLIGRAM(S): at 20:25

## 2023-01-01 RX ADMIN — Medication 40 MILLIEQUIVALENT(S): at 20:24

## 2023-01-01 RX ADMIN — SACUBITRIL AND VALSARTAN 1 TABLET(S): 24; 26 TABLET, FILM COATED ORAL at 06:09

## 2023-01-01 RX ADMIN — RIVAROXABAN 20 MILLIGRAM(S): KIT at 18:01

## 2023-01-01 RX ADMIN — Medication 25 MILLIGRAM(S): at 06:09

## 2023-01-01 RX ADMIN — Medication 25 MILLIGRAM(S): at 05:41

## 2023-01-01 RX ADMIN — SODIUM CHLORIDE 75 MILLILITER(S): 9 INJECTION, SOLUTION INTRAVENOUS at 16:09

## 2023-01-01 RX ADMIN — ATORVASTATIN CALCIUM 40 MILLIGRAM(S): 80 TABLET, FILM COATED ORAL at 22:03

## 2023-01-01 RX ADMIN — SACUBITRIL AND VALSARTAN 1 TABLET(S): 24; 26 TABLET, FILM COATED ORAL at 17:53

## 2023-01-01 RX ADMIN — OXYCODONE HYDROCHLORIDE 5 MILLIGRAM(S): 5 TABLET ORAL at 22:59

## 2023-01-01 RX ADMIN — OXYCODONE HYDROCHLORIDE 5 MILLIGRAM(S): 5 TABLET ORAL at 18:39

## 2023-01-01 RX ADMIN — OXYCODONE HYDROCHLORIDE 5 MILLIGRAM(S): 5 TABLET ORAL at 08:58

## 2023-01-01 RX ADMIN — Medication 975 MILLIGRAM(S): at 23:33

## 2023-01-01 RX ADMIN — Medication 975 MILLIGRAM(S): at 05:58

## 2023-01-01 RX ADMIN — Medication 1 MILLIGRAM(S): at 21:02

## 2023-01-01 RX ADMIN — Medication 15 MILLIGRAM(S): at 00:12

## 2023-01-01 RX ADMIN — Medication 81 MILLIGRAM(S): at 11:51

## 2023-01-01 RX ADMIN — SACUBITRIL AND VALSARTAN 1 TABLET(S): 24; 26 TABLET, FILM COATED ORAL at 17:05

## 2023-01-01 RX ADMIN — SACUBITRIL AND VALSARTAN 1 TABLET(S): 24; 26 TABLET, FILM COATED ORAL at 17:04

## 2023-01-01 RX ADMIN — HYDROMORPHONE HYDROCHLORIDE 0.25 MILLIGRAM(S): 2 INJECTION INTRAMUSCULAR; INTRAVENOUS; SUBCUTANEOUS at 16:25

## 2023-01-01 RX ADMIN — Medication 40 MILLIGRAM(S): at 05:03

## 2023-01-01 RX ADMIN — AMLODIPINE BESYLATE 2.5 MILLIGRAM(S): 2.5 TABLET ORAL at 05:58

## 2023-01-01 RX ADMIN — Medication 975 MILLIGRAM(S): at 05:40

## 2023-01-01 RX ADMIN — Medication 975 MILLIGRAM(S): at 06:08

## 2023-01-01 RX ADMIN — ENOXAPARIN SODIUM 30 MILLIGRAM(S): 100 INJECTION SUBCUTANEOUS at 13:01

## 2023-01-01 RX ADMIN — ENOXAPARIN SODIUM 30 MILLIGRAM(S): 100 INJECTION SUBCUTANEOUS at 13:00

## 2023-01-01 RX ADMIN — Medication 975 MILLIGRAM(S): at 22:03

## 2023-01-01 RX ADMIN — Medication 975 MILLIGRAM(S): at 21:27

## 2023-01-01 RX ADMIN — Medication 975 MILLIGRAM(S): at 15:31

## 2023-01-01 RX ADMIN — Medication 500 MILLIGRAM(S): at 11:03

## 2023-01-01 RX ADMIN — OXYCODONE HYDROCHLORIDE 5 MILLIGRAM(S): 5 TABLET ORAL at 18:03

## 2023-01-01 RX ADMIN — Medication 40 MILLIEQUIVALENT(S): at 19:50

## 2023-01-01 RX ADMIN — AMLODIPINE BESYLATE 2.5 MILLIGRAM(S): 2.5 TABLET ORAL at 05:37

## 2023-01-01 RX ADMIN — Medication 975 MILLIGRAM(S): at 14:45

## 2023-01-01 RX ADMIN — SODIUM CHLORIDE 75 MILLILITER(S): 9 INJECTION, SOLUTION INTRAVENOUS at 20:38

## 2023-01-01 RX ADMIN — Medication 650 MILLIGRAM(S): at 05:16

## 2023-01-01 RX ADMIN — ESCITALOPRAM OXALATE 5 MILLIGRAM(S): 10 TABLET, FILM COATED ORAL at 11:51

## 2023-01-01 RX ADMIN — Medication 40 MILLIGRAM(S): at 06:13

## 2023-01-01 RX ADMIN — Medication 40 MILLIEQUIVALENT(S): at 13:23

## 2023-01-01 RX ADMIN — Medication 975 MILLIGRAM(S): at 06:38

## 2023-01-01 RX ADMIN — CEFTRIAXONE 100 MILLIGRAM(S): 500 INJECTION, POWDER, FOR SOLUTION INTRAMUSCULAR; INTRAVENOUS at 18:48

## 2023-01-01 RX ADMIN — SODIUM CHLORIDE 50 MILLILITER(S): 9 INJECTION INTRAMUSCULAR; INTRAVENOUS; SUBCUTANEOUS at 01:07

## 2023-01-01 RX ADMIN — Medication 1 MILLIGRAM(S): at 20:15

## 2023-01-01 RX ADMIN — CHLORHEXIDINE GLUCONATE 1 APPLICATION(S): 213 SOLUTION TOPICAL at 05:32

## 2023-01-01 RX ADMIN — ENOXAPARIN SODIUM 30 MILLIGRAM(S): 100 INJECTION SUBCUTANEOUS at 11:17

## 2023-01-01 RX ADMIN — OXYCODONE HYDROCHLORIDE 5 MILLIGRAM(S): 5 TABLET ORAL at 12:55

## 2023-01-01 RX ADMIN — RIVAROXABAN 20 MILLIGRAM(S): KIT at 18:48

## 2023-01-01 RX ADMIN — Medication 81 MILLIGRAM(S): at 12:59

## 2023-01-01 RX ADMIN — SACUBITRIL AND VALSARTAN 1 TABLET(S): 24; 26 TABLET, FILM COATED ORAL at 05:24

## 2023-01-01 RX ADMIN — Medication 975 MILLIGRAM(S): at 14:22

## 2023-01-01 RX ADMIN — CHLORHEXIDINE GLUCONATE 1 APPLICATION(S): 213 SOLUTION TOPICAL at 05:17

## 2023-01-01 RX ADMIN — OXYCODONE HYDROCHLORIDE 5 MILLIGRAM(S): 5 TABLET ORAL at 16:32

## 2023-01-01 RX ADMIN — OXYCODONE HYDROCHLORIDE 5 MILLIGRAM(S): 5 TABLET ORAL at 05:44

## 2023-01-01 RX ADMIN — Medication 1 TABLET(S): at 13:01

## 2023-01-01 RX ADMIN — ESCITALOPRAM OXALATE 5 MILLIGRAM(S): 10 TABLET, FILM COATED ORAL at 18:01

## 2023-01-01 RX ADMIN — ESCITALOPRAM OXALATE 5 MILLIGRAM(S): 10 TABLET, FILM COATED ORAL at 13:25

## 2023-01-01 RX ADMIN — Medication 40 MILLIGRAM(S): at 13:24

## 2023-01-01 RX ADMIN — Medication 975 MILLIGRAM(S): at 23:43

## 2023-01-01 RX ADMIN — ENOXAPARIN SODIUM 30 MILLIGRAM(S): 100 INJECTION SUBCUTANEOUS at 13:24

## 2023-01-01 RX ADMIN — ATORVASTATIN CALCIUM 40 MILLIGRAM(S): 80 TABLET, FILM COATED ORAL at 21:48

## 2023-01-01 RX ADMIN — Medication 650 MILLIGRAM(S): at 04:16

## 2023-01-01 RX ADMIN — Medication 25 MILLIGRAM(S): at 05:57

## 2023-01-01 RX ADMIN — MORPHINE SULFATE 2 MILLIGRAM(S): 50 CAPSULE, EXTENDED RELEASE ORAL at 00:01

## 2023-01-01 RX ADMIN — Medication 0.5 MILLIGRAM(S): at 22:43

## 2023-01-01 RX ADMIN — AMLODIPINE BESYLATE 2.5 MILLIGRAM(S): 2.5 TABLET ORAL at 05:03

## 2023-01-01 RX ADMIN — Medication 100 MILLIGRAM(S): at 21:56

## 2023-01-01 RX ADMIN — Medication 25 MILLIGRAM(S): at 06:13

## 2023-01-01 RX ADMIN — ESCITALOPRAM OXALATE 5 MILLIGRAM(S): 10 TABLET, FILM COATED ORAL at 11:06

## 2023-01-01 RX ADMIN — Medication 500 MILLIGRAM(S): at 17:05

## 2023-01-01 RX ADMIN — Medication 25 MILLIGRAM(S): at 05:36

## 2023-01-01 RX ADMIN — Medication 25 MILLIGRAM(S): at 05:19

## 2023-01-01 RX ADMIN — CHLORHEXIDINE GLUCONATE 1 APPLICATION(S): 213 SOLUTION TOPICAL at 06:31

## 2023-01-01 RX ADMIN — Medication 25 MILLIGRAM(S): at 05:03

## 2023-01-01 RX ADMIN — OXYCODONE HYDROCHLORIDE 5 MILLIGRAM(S): 5 TABLET ORAL at 20:04

## 2023-01-01 RX ADMIN — RIVAROXABAN 20 MILLIGRAM(S): KIT at 17:09

## 2023-01-01 RX ADMIN — POLYETHYLENE GLYCOL 3350 17 GRAM(S): 17 POWDER, FOR SOLUTION ORAL at 12:08

## 2023-01-01 RX ADMIN — Medication 81 MILLIGRAM(S): at 13:25

## 2023-01-01 RX ADMIN — Medication 25 MILLIGRAM(S): at 05:44

## 2023-01-01 RX ADMIN — SACUBITRIL AND VALSARTAN 1 TABLET(S): 24; 26 TABLET, FILM COATED ORAL at 17:38

## 2023-01-01 RX ADMIN — Medication 975 MILLIGRAM(S): at 22:01

## 2023-01-01 RX ADMIN — ATORVASTATIN CALCIUM 40 MILLIGRAM(S): 80 TABLET, FILM COATED ORAL at 21:25

## 2023-01-01 RX ADMIN — SACUBITRIL AND VALSARTAN 1 TABLET(S): 24; 26 TABLET, FILM COATED ORAL at 05:32

## 2023-01-01 RX ADMIN — POLYETHYLENE GLYCOL 3350 17 GRAM(S): 17 POWDER, FOR SOLUTION ORAL at 11:49

## 2023-01-01 RX ADMIN — RIVAROXABAN 20 MILLIGRAM(S): KIT at 17:32

## 2023-01-01 RX ADMIN — OXYCODONE HYDROCHLORIDE 5 MILLIGRAM(S): 5 TABLET ORAL at 06:38

## 2023-01-01 RX ADMIN — ESCITALOPRAM OXALATE 5 MILLIGRAM(S): 10 TABLET, FILM COATED ORAL at 12:17

## 2023-01-01 RX ADMIN — SACUBITRIL AND VALSARTAN 1 TABLET(S): 24; 26 TABLET, FILM COATED ORAL at 05:26

## 2023-01-01 RX ADMIN — Medication 40 MILLIGRAM(S): at 05:19

## 2023-01-01 RX ADMIN — Medication 1 TABLET(S): at 11:06

## 2023-01-01 RX ADMIN — Medication 975 MILLIGRAM(S): at 13:19

## 2023-01-01 RX ADMIN — HYDROMORPHONE HYDROCHLORIDE 0.25 MILLIGRAM(S): 2 INJECTION INTRAMUSCULAR; INTRAVENOUS; SUBCUTANEOUS at 16:45

## 2023-01-01 RX ADMIN — OXYCODONE HYDROCHLORIDE 5 MILLIGRAM(S): 5 TABLET ORAL at 12:08

## 2023-01-01 RX ADMIN — ESCITALOPRAM OXALATE 5 MILLIGRAM(S): 10 TABLET, FILM COATED ORAL at 11:03

## 2023-01-04 NOTE — PATIENT PROFILE ADULT - VISION (WITH CORRECTIVE LENSES IF THE PATIENT USUALLY WEARS THEM):
Chest pain
macular degeneration/Severely impaired: cannot locate objects without hearing or touching them or patient nonresponsive.

## 2023-01-04 NOTE — HISTORY OF PRESENT ILLNESS
[de-identified] : This is an 88 year old man with a history of hypertension, and more recent diagnosis of a pulmonary embolism.  Patient had pain in the back and the joints for several years, spinal stenosis, more recently had COVID in October followed by a pulmonary emboli that had been diagnosed with a CT angiogram in December that had demonstrated 2 pulmonary emboli though one was described as being questionable given small size.    Patient started on Xarelto.  Was initially on loading dose of 15mg BID, then once daily.  \par FIrst episode of COVID in the very beginning of the pandemic very bad episode, then the one in October was not as severe, but then patient had the CT scan at [facility].   then November 23td was admitted to [place].  Patient had smoked but over 50 years ago.  no family history of cancer, just a stomach aneurysm.  No history of thromboses.  \par \par On top of the Ct scan, patient also had an echocardiogram.  \par Also seeing Dr. Ellis at [facility].  \par \par patient has found the xarelto cost prohibitive, can have the [employer]  service see if they can find a less costly alternative a benefit.

## 2023-01-04 NOTE — ASSESSMENT
[FreeTextEntry1] : This is an 88 year old man with a history of hypertension, and more recent diagnosis of a pulmonary embolism.  Patient had pain in the back and the joints for several years, spinal stenosis, more recently had COVID in October followed by a pulmonary emboli that had been diagnosed with a CT angiogram in December that had demonstrated 2 pulmonary emboli.  Patient treated with Xarelto 20mg daily maintenance after she completed the typical 15mg BID loading dose.  Feeling better now, some residual chest tightness and shortness of breath, but otherwise well.  \par \par Provoked Pulmonary Embolism secondary to COVID 19 in October.  Patient is elderly, hypercoagulable workup not indicated.  Recommend a total of 6 months of anticoagulation, follow up at the End of May in 5 more months to discuss process of discontinuation of full dose anticoagulation.

## 2023-01-10 NOTE — ED PROVIDER NOTE - PHYSICAL EXAMINATION
GEN: Pt in NAD, A&O x3. GCS 15.   EYES: No periorbital ecchymosis, sclera white w/o injection PERRL, EOMI.  HENT: Head NCAT. Nares without deformity, no DC. No auricular tenderness, no ear DC. no Heard's sign. No dental trauma. Neck supple FROM. No midline or paracervical tenderness. Trachea midline.   RESP: No retractions or chest wall deformities, no signs of trauma/bruising. No chest wall tenderness, CTA b/l, no wheezes, rales, or rhonchi. SpO2 94% on room air.  CARDIAC: RRR clear distinct S1, S2.  ABDOMEN: Abdomen soft, non-tender.  VASC: 2+ radial and dorsalis pedis pulses b/l.   NEURO: CN2-12 grossly intact. Normal and equal sensation and 5/5 strength UE and LE b/l. Pronator drift negative. Normal gait and gross cerebellar function.   MSK: No joint erythema or obvious deformity. No obvious spinal deformity, no midline spinal ttp, no step-offs. Mild tenderness L anterolateral proximal thigh, otherwise no bony tenderness. Pelvis stable. L hip ROM not tested due to pain/possible injury, FROM remainder of b/l UE and LE w/o pain.  SKIN: No rashes on the trunk.

## 2023-01-10 NOTE — ED PROVIDER NOTE - ATTENDING APP SHARED VISIT CONTRIBUTION OF CARE
I, Amanda Ro, performed a history and physical exam of the patient and discussed their management with the resident and /or advanced care provider. I reviewed the resident and /or ACP's note and agree with the documented findings and plan of care. I was present and available for all procedures.     88-year-old female with history of PE on Xarelto, anxiety, depression, hyperlipidemia, MVP presenting to the ED status post mechanical fall at home.  Patient with complaints of left hip pain.  Was bending over to  a cracker off the floor when she lost her balance and fell forward positive head strike no LOC.  Was unable to get off the ground on her own.  2 hours in the floor crawl to the living room where family was able to help her up.  Having pain in the left hip region.  Denies headache nausea vomiting.  No numbness tingling or weakness.  No chest pain, abdominal pain or neck pain.  With chronic back pain denies any worsening of her back pain.  Was given 5 mg of morphine by EMS in route.    Patient in no acute distress, alert and oriented x3.  No periorbital ecchymosis or crepitus.  No abrasions or lacerations to the head.  Trachea midline.  Nonlabored respirations, lungs clear to auscultation bilaterally.  No anterior chest wall tenderness.  Regular rate and rhythm.  Abdomen is soft nontender no rebound or guarding.  No pelvic instability.  No midline spinal tenderness or step-offs.  Still pulses intact.  Tenderness of the left anterior lateral proximal thigh.  Concern for hip fracture versus pelvic fracture.  Will obtain labs, hip/pelvic x-ray, CT bony pelvis if x-ray is negative given pain and inability to ambulate.  CT head given patient on Xarelto with positive head strike.  Analgesia likely orthopedic consultation and admission.

## 2023-01-10 NOTE — ED ADULT TRIAGE NOTE - CHIEF COMPLAINT QUOTE
mechanical fall, +head strike, on blood thinners, +hematoma to back of head, no LOC also endorsing left hip pain. Per EMS, pt received 5mg morphine PTA.

## 2023-01-10 NOTE — ED PROVIDER NOTE - OBJECTIVE STATEMENT
88-year-old female past medical history of PE on Xarelto, anxiety, depression, hyperlipidemia, MVP presents status post mechanical fall at home with head strike, complaining of left hip pain.  Patient reports she was bending over to  a [employer] cracker off of the floor, fell and struck her occipital region, was unable to get herself off of the floor, and was on the floor for 2 hours, was able to crawl out to the living area where her family helped her up.  Patient reports pain in the left hip anterior lateral region.  Patient denies headache, nausea, vomiting, numbness, paresthesias, weakness, chest pain, abdominal pain, neck pain.  Patient has chronic back pain, denies new/acute back pain.  Patient was brought in by EMS, as per EMS report 5 mg of morphine was administered in route, and patient was placed on nasal cannula oxygen for comfort.  Patient denies baseline hypoxia after being diagnosed with PE. 88-year-old female past medical history of PE on Xarelto, anxiety, depression, hyperlipidemia, MVP presents status post mechanical fall at home with head strike, complaining of left hip pain.  Patient reports she was bending over to  a [employer] cracker off of the floor, fell and struck her occipital region, no LOC, was unable to get herself off of the floor, and was on the floor for 2 hours, was able to crawl out to the living area where her family helped her up.  Patient reports pain in the left hip anterior lateral region.  Patient denies headache, nausea, vomiting, numbness, paresthesias, weakness, chest pain, abdominal pain, neck pain.  Patient has chronic back pain, denies new/acute back pain.  Patient was brought in by EMS, as per EMS report 5 mg of morphine was administered in route, and patient was placed on nasal cannula oxygen for comfort.  Patient denies baseline hypoxia after being diagnosed with PE.

## 2023-01-10 NOTE — ED PROVIDER NOTE - CLINICAL SUMMARY MEDICAL DECISION MAKING FREE TEXT BOX
88-year-old female history of PE on Xarelto presenting status post mechanical fall with head strike, no LOC, on ground for at least 2 hours, complaining of left hip pain pain with/difficulty ambulating.  Patient given 5 mg of morphine in route and supplemental history obtained from EMS at bedside.  Prior records reviewed for additional past medical history.  Mild anterolateral left proximal thigh/hip pain on exam without obvious deformity.  Otherwise nonfocal physical exam, no focal neurologic deficits.  No evidence of basilar skull fracture.  Rule out ICH/SDH.  Evaluate for rib fracture, pelvis or left hip fracture.  Evaluate for metabolic derangement, rhabdomyolysis.  Plan for pain control imaging including CT head x-ray chest pelvis left hip and femur, labs, including CK, COVID swab, pain control and reassess.  Dispo pending work-up. -Jed Wilkins PA-C

## 2023-01-10 NOTE — ED PROVIDER NOTE - PROGRESS NOTE DETAILS
results d/w pt and daughter, discussed plan of care, d/w ortho resident will see pt. Pt noted to be hypoxic SpO2 87% on RA, appears well appearing, no tachypneic, placed on 2L NC, ECG and troponins ordered. -Jed Wilkins PA-C ortho resident Dr. Gomez evaluated pt, recommending admission to medicine for medical optimization. d/w hospitalist who will admit pt. -Jed Wilkins PA-C Troponins noted to be elevated, no delta, d/w pt who has no active CP, rpt ECG ordered, CT pelvis showing questionable evidence of malignancy, will obtain CTA to assess for worsening clot burden on xarelto, given pt is on NOAC will not give heparin at this time, troponins stable does not seem to be c/w NSTEMI at this time. d/w inpatient team. -Jed Wilkins PA-C Trice Owens MD, Attending: Patient received at attending sign out from dr. Gomez, + hip fx, but in a setting of hypoxia, intermittent, elevated trop, rpt ekg with LLLB and intermittent worsening J point elevation, NSTEMI vs. worsening PE, cards consult and CTA, otherwise vitals are stable for now, pt is on Xarelto, not initiate any more anticoaglant for now. Troponins noted to be elevated, no delta, d/w pt who has no active CP, rpt ECG ordered, CT pelvis showing questionable evidence of malignancy, will obtain CTA to assess for worsening clot burden on xarelto, given pt is on NOAC will not give heparin at this time, troponins with no delta does not seem to be c/w NSTEMI at this time. d/w inpatient team. -Jed Wilkins PA-C Case was discussed with cardio fellow at the time the repeat ECG was received, showing what appears to be dynamic changes however pt has underlying LBBB. As per cards fellow the ECGs were reviewed and not meeting modified sgarbossa criteria so not requiring ACS treatment at this time. Pt has been seen by Dr. Cha in the past and recommend contacting Dr. Cha regarding further cardio consult. -Jed Wiklins PA-C

## 2023-01-10 NOTE — ED ADULT NURSE NOTE - OBJECTIVE STATEMENT
brought in by [facility] from pt home s/p fall when pt bent over to  a cracker on the floor when she lost her balance and fell on to floor with head strike; pt is on Xarelto for PE; c/o left hip/groin pain with external rotation noted of left leg; pt is alert and oriented x 4; skin warm and dry; pt was medicated with 5 mg MS IV by ems vis # 20 gauge SL to left AC; pt oriented to unit and routines; primafit placed for urinary elimination.

## 2023-01-11 NOTE — H&P ADULT - PROBLEM SELECTOR PLAN 1
Seen on imaging. Sustained in fall.  - Seen by ortho. Plan for OR once INR normalized.   - Start standing tylenol, oxy 2.5mg for moderate and 5mg for severe pain  - Bowel regimen while on opioids

## 2023-01-11 NOTE — H&P ADULT - NSHPPHYSICALEXAM_GEN_ALL_CORE
.  VITAL SIGNS:  T(C): 36.5 (01-11-23 @ 07:34), Max: 37.1 (01-11-23 @ 06:31)  T(F): 97.7 (01-11-23 @ 07:34), Max: 98.8 (01-11-23 @ 06:31)  HR: 98 (01-11-23 @ 07:34) (95 - 103)  BP: 132/74 (01-11-23 @ 07:34) (132/74 - 148/91)  BP(mean): --  RR: 16 (01-11-23 @ 07:34) (16 - 20)  SpO2: 96% (01-11-23 @ 07:34) (88% - 99%)  Wt(kg): --    PHYSICAL EXAM:    Constitutional: WDWN resting comfortably in bed; NAD  Head: NC/AT  Eyes: PERRL, EOMI, anicteric sclera  ENT: no nasal discharge; uvula midline, no oropharyngeal erythema or exudates; MMM  Neck: supple; no JVD or thyromegaly  Respiratory: CTA B/L; no W/R/R, no retractions  Cardiac: +S1/S2; RRR; no M/R/G; PMI non-displaced  Gastrointestinal: soft, NT/ND; no rebound or guarding; +BSx4  Genitourinary: normal external genitalia  Back: spine midline, no bony tenderness or step-offs; no CVAT B/L  Extremities: WWP, no clubbing or cyanosis; no peripheral edema  Musculoskeletal: NROM x4; no joint swelling, tenderness or erythema  Vascular: 2+ radial, femoral, DP/PT pulses B/L  Dermatologic: skin warm, dry and intact; no rashes, wounds, or scars  Lymphatic: no submandibular or cervical LAD  Neurologic: AAOx3; CNII-XII grossly intact; no focal deficits  Psychiatric: affect and characteristics of appearance, verbalizations, behaviors are appropriate .  VITAL SIGNS:  T(C): 36.5 (01-11-23 @ 07:34), Max: 37.1 (01-11-23 @ 06:31)  T(F): 97.7 (01-11-23 @ 07:34), Max: 98.8 (01-11-23 @ 06:31)  HR: 98 (01-11-23 @ 07:34) (95 - 103)  BP: 132/74 (01-11-23 @ 07:34) (132/74 - 148/91)  BP(mean): --  RR: 16 (01-11-23 @ 07:34) (16 - 20)  SpO2: 96% (01-11-23 @ 07:34) (88% - 99%)  Wt(kg): --    PHYSICAL EXAM:    Constitutional: NAD  Head: NC/AT  Neck: supple; no JVD or thyromegaly  Respiratory: CTA B/L; no W/R/R, no retractions  Cardiac: +S1/S2; RRR; no M/R/G  Gastrointestinal: soft, NT/ND; no rebound or guarding; +BSx4  Extremities: Edema of LLE  Neurologic: AAOx3; CNII-XII grossly intact; no focal deficits

## 2023-01-11 NOTE — H&P ADULT - NSHPLABSRESULTS_GEN_ALL_CORE
.  LABS:                         11.6   9.85  )-----------( 217      ( 11 Jan 2023 04:16 )             36.7     01-11    141  |  104  |  14  ----------------------------<  119<H>  3.9   |  25  |  0.56    Ca    9.1      11 Jan 2023 04:16    TPro  6.6  /  Alb  4.0  /  TBili  0.6  /  DBili  x   /  AST  28  /  ALT  19  /  AlkPhos  52  01-10    PT/INR - ( 10 Tavo 2023 20:28 )   PT: 27.6 sec;   INR: 2.38 ratio         PTT - ( 10 Tavo 2023 20:28 )  PTT:29.6 sec    CARDIAC MARKERS ( 10 Tavo 2023 20:28 )  x     / x     / 36 U/L / x     / x                RADIOLOGY, EKG & ADDITIONAL TESTS: Reviewed.

## 2023-01-11 NOTE — H&P ADULT - PROBLEM SELECTOR PLAN 3
Believed to be 2/2 recent COVID infection. Recently saw Hematology as outpatient. Plan for Xarelto for 6 months  - Hold Xarelto in prep for possible OR  - Outpatient heme f/u

## 2023-01-11 NOTE — H&P ADULT - PROBLEM SELECTOR PLAN 6
Troponin level elevated on admission, repeats essentially flat. Has known LBBB on EKG. Currently without chest pain  - Suspect elevation likely 2/2 PE  - Will check TTE to eval for RH strain and for any WMA

## 2023-01-11 NOTE — ED ADULT NURSE NOTE - NS ED NURSE LEVEL OF CONSCIOUSNESS ORIENTATION
Oriented - self; Oriented - place; Oriented - time Consent (Temporal Branch)/Introductory Paragraph: The rationale for Mohs was explained to the patient and consent was obtained. The risks, benefits and alternatives to therapy were discussed in detail. Specifically, the risks of damage to the temporal branch of the facial nerve, infection, scarring, bleeding, prolonged wound healing, incomplete removal, allergy to anesthesia, and recurrence were addressed. Prior to the procedure, the treatment site was clearly identified and confirmed by the patient. All components of Universal Protocol/PAUSE Rule completed.

## 2023-01-11 NOTE — H&P ADULT - HISTORY OF PRESENT ILLNESS
This is a 87 y/o female with h/o HTN, PE 2/2 recent covid infection, anxiety/depression who presented after a fall. Pt was in her usual state of health when walking at home with her cane. She saw a cracker on the floor and bent down to pick it up. Normally she holds on to something when doing this but this time she did not and ended up falling. Pt was unable to get up, did not have her phone with her and remained on the floor until her daughter got home as she was home alone. Stated she had pain in her hip region after the fall. Denied LOC, chest pain , SOB, palpitations. Pt remembers the event

## 2023-01-11 NOTE — H&P ADULT - ASSESSMENT
88-year-old female past medical history of PE on Xarelto, anxiety, depression, hyperlipidemia, MVP who presented after a fall at home found to have impacted left femoral neck fracture

## 2023-01-11 NOTE — ED ADULT NURSE REASSESSMENT NOTE - NS ED NURSE REASSESS COMMENT FT1
going to ct scan
in xray
placed on cardiac monitor; admitted to telemetry
Pt reassessed with complaints of fall at home. Pt is a/ox4 and verbal. Speech is clear and able to speak in full sentences without distress. Airway is patent with no obstruction nor blocking secretions. Breathing is even and unlabored on room air. Pt has strong pulses palpated bilaterally. Pt is SR on the cardiac monitor. Neuro check is wnl. Limited rom with the left leg due to fx and pain. Pt denies chest pain, n/v and sob. No acute distress noted. Pt has call light within the reach of the pt at the bedside. Will continue to monitor the pt.

## 2023-01-11 NOTE — CONSULT NOTE ADULT - SUBJECTIVE AND OBJECTIVE BOX
ORTHOPAEDIC SURGERY CONSULT NOTE    88y Female presents s/p [facility] fall picking up a [employer] cracker c/o severe L hip pain and inability to ambulate.  Patient endorses headstrike but denies LOC. Patient denies radiation of pain. Patient denies numbness/tingling/burning in the LLE. No other bone/joint complaints. Patient is a community ambulator at baseline with a rolling walker.     Patient recently had COVID complicated by a PE. On therapeutic xarelto, last dose 1/10 at noon.     PAST MEDICAL & SURGICAL HISTORY:  Anxiety      Depression      Diverticulitis      Macular degeneration  right eye      MVP (mitral valve prolapse)      OA (osteoarthritis)  knee and hip      Hyperlipidemia      Status post cholecystectomy      S/P left cataract extraction  right and left        MEDICATIONS  (STANDING):    MEDICATIONS  (PRN):    Allergies    No Known Allergies    Intolerances                              11.8   9.08  )-----------( 203      ( 10 Tavo 2023 20:28 )             37.6     01-10    142  |  104  |  17  ----------------------------<  120<H>  3.7   |  25  |  0.59    Ca    9.2      10 Tavo 2023 20:28    TPro  6.6  /  Alb  4.0  /  TBili  0.6  /  DBili  x   /  AST  28  /  ALT  19  /  AlkPhos  52  01-10    PT/INR - ( 10 Tavo 2023 20:28 )   PT: 27.6 sec;   INR: 2.38 ratio         PTT - ( 10 Tavo 2023 20:28 )  PTT:29.6 sec    T(C): 36.7 (01-10-23 @ 23:55), Max: 37 (01-10-23 @ 20:08)  HR: 99 (01-10-23 @ 23:55) (99 - 103)  BP: 148/91 (01-10-23 @ 23:55) (142/70 - 148/91)  RR: 18 (01-10-23 @ 23:55) (18 - 20)  SpO2: 88% (01-10-23 @ 23:55) (88% - 99%)  Wt(kg): --    PE   LLE:  Skin intact; No ecchymosis/soft tissue swelling  Compartments soft; + TTP about hip. No TTP to knee/leg/ankle/foot   ROM hip limited 2/2 pain   Unable to SLR; + Log Roll/Heel Strike  Motor intact GS/TA/FHL/EHL  SILT L2-S1  DP/PT pulses 2+    Secondary: No TTP over bony prominences. SILT b/l, ROM intact b/l. Distal pulses palpable.     Imaging:  XR demonstrating a valgus impacted femoral neck fracture    88y Female with a L femoral neck fracture  - Plan for OR when INR normalized  - Pain control - Recommend tylenol 975mg q8h standing, oxycodone 2.5mg q4h PRN moderate pain, oxycodone 5mg q4h PRN severe pain   - NPO/IVF pMN  - COVID PCR  - CBC/BMP/Coags/UA/T+S x2  - EKG/CXR  - HOLD XARELTO  - Patient will need medical optimization documented for OR

## 2023-01-12 NOTE — PATIENT PROFILE ADULT - FALL HARM RISK - HARM RISK INTERVENTIONS

## 2023-01-12 NOTE — PROGRESS NOTE ADULT - SUBJECTIVE AND OBJECTIVE BOX
Pt seen/examined. Doing well. Pain controlled. No acute overnight complaints or events.    T(C): 36.5 (01-12-23 @ 04:04), Max: 36.8 (01-11-23 @ 11:35)  HR: 84 (01-12-23 @ 04:04) (84 - 103)  BP: 134/84 (01-12-23 @ 04:04) (101/63 - 134/84)  RR: 17 (01-12-23 @ 04:04) (16 - 18)  SpO2: 97% (01-12-23 @ 04:04) (90% - 97%)  Wt(kg): --  - Gen: NAD    LLE:  Skin intact; No ecchymosis/soft tissue swelling  Compartments soft; + TTP about hip. No TTP to knee/leg/ankle/foot   ROM hip limited 2/2 pain   Unable to SLR; + Log Roll/Heel Strike  Motor intact GS/TA/FHL/EHL  SILT L2-S1  DP/PT pulses 2+    88y Female with a L femoral neck fracture    - Plan for OR 1/13  - Pain control - Recommend tylenol 975mg q8h standing, oxycodone 2.5mg q4h PRN moderate pain, oxycodone 5mg q4h PRN severe pain   - NPO/IVF pMN  - COVID PCR  - CBC/BMP/Coags/UA/T+S x2  - EKG/CXR  - HOLD XARELTO  - Patient will need medical optimization documented for OR!!

## 2023-01-12 NOTE — PROGRESS NOTE ADULT - SUBJECTIVE AND OBJECTIVE BOX
Patient is a 88y old  Female who presents with a chief complaint of Fall (12 Jan 2023 06:33)      SUBJECTIVE / OVERNIGHT EVENTS: Patient seen and examined at bedside. States that she feels well denies any CP, SOB, abd pain and n/v. No acute events overnight.      ROS:  All other review of systems negative    Allergies    No Known Allergies    Intolerances        MEDICATIONS  (STANDING):  acetaminophen     Tablet .. 975 milliGRAM(s) Oral every 8 hours  amLODIPine   Tablet 2.5 milliGRAM(s) Oral daily  chlorhexidine 2% Cloths 1 Application(s) Topical <User Schedule>  FLUoxetine Solution 5 milliGRAM(s) Oral daily  metoprolol succinate ER 25 milliGRAM(s) Oral daily  polyethylene glycol 3350 17 Gram(s) Oral daily    MEDICATIONS  (PRN):  aluminum hydroxide/magnesium hydroxide/simethicone Suspension 30 milliLiter(s) Oral every 4 hours PRN Dyspepsia  LORazepam     Tablet 0.5 milliGRAM(s) Oral at bedtime PRN Anxiety  melatonin 3 milliGRAM(s) Oral at bedtime PRN Insomnia  ondansetron Injectable 4 milliGRAM(s) IV Push every 8 hours PRN Nausea and/or Vomiting  oxyCODONE    IR 5 milliGRAM(s) Oral every 6 hours PRN Severe Pain (7 - 10)  oxyCODONE    IR 2.5 milliGRAM(s) Oral every 4 hours PRN Moderate Pain (4 - 6)      Vital Signs Last 24 Hrs  T(C): 36.6 (12 Jan 2023 11:12), Max: 36.6 (11 Jan 2023 19:45)  T(F): 97.9 (12 Jan 2023 11:12), Max: 97.9 (11 Jan 2023 19:45)  HR: 80 (12 Jan 2023 11:12) (80 - 90)  BP: 116/68 (12 Jan 2023 11:12) (101/63 - 134/84)  BP(mean): --  RR: 16 (12 Jan 2023 11:12) (16 - 18)  SpO2: 97% (12 Jan 2023 11:13) (87% - 97%)    Parameters below as of 12 Jan 2023 11:13  Patient On (Oxygen Delivery Method): nasal cannula  O2 Flow (L/min): 2    CAPILLARY BLOOD GLUCOSE        I&O's Summary      PHYSICAL EXAM:  GENERAL: elderly   HEAD:  Atraumatic, Normocephalic  EYES: EOMI, PERRLA, conjunctiva and sclera clear  NECK: Supple, No JVD  CHEST/LUNG: Clear to auscultation bilaterally; No wheeze  HEART: Regular rate and rhythm; No murmurs, rubs, or gallops  ABDOMEN: Soft, Nontender, Nondistended; Bowel sounds present  EXTREMITIES:  2+ Peripheral Pulses, No edema  NEUROLOGY: AAOx3, non-focal      LABS:                        11.4   7.96  )-----------( 165      ( 12 Jan 2023 07:10 )             37.0     01-12    139  |  102  |  17  ----------------------------<  88  3.8   |  24  |  0.66    Ca    9.2      12 Jan 2023 07:10    TPro  6.6  /  Alb  4.0  /  TBili  0.6  /  DBili  x   /  AST  28  /  ALT  19  /  AlkPhos  52  01-10    PT/INR - ( 12 Jan 2023 07:10 )   PT: 16.4 sec;   INR: 1.42 ratio         PTT - ( 10 Tavo 2023 20:28 )  PTT:29.6 sec  CARDIAC MARKERS ( 10 Tavo 2023 20:28 )  x     / x     / 36 U/L / x     / x              RADIOLOGY & ADDITIONAL TESTS:     Care Discussed with Consultants/Other Providers: Dr. Anguiano and ortho    Case Discussed with dtr Smiley

## 2023-01-12 NOTE — PATIENT PROFILE ADULT - FLU SEASON?
Date & Time: 12/2/2024, 4:19 PM  Patient: Chao Reese  Encounter Provider(s):    Sully Bee PA-C       To Whom It May Concern:    Chao Reese was seen and treated in our department on 12/2/2024. He should not return to work until 12/9/2024 .    If you have any questions or concerns, please do not hesitate to call.        _____________________________  Physician/APC Signature            Yes...

## 2023-01-12 NOTE — CONSULT NOTE ADULT - SUBJECTIVE AND OBJECTIVE BOX
DATE OF SERVICE: 01-12-23 @ 15:33    CHIEF COMPLAINT:Patient is a 88y old  Female who presents with a chief complaint of Fall (12 Jan 2023 12:25)      HISTORY OF PRESENT ILLNESS:HPI:  This is a 89 y/o female with h/o HTN, PE 2/2 recent covid infection, anxiety/depression who presented after a fall. Pt was in her usual state of health when walking at home with her cane. She saw a cracker on the floor and bent down to pick it up. Normally she holds on to something when doing this but this time she did not and ended up falling. Pt was unable to get up, did not have her phone with her and remained on the floor until her daughter got home as she was home alone. Stated she had pain in her hip region after the fall. Denied LOC, chest pain , SOB, palpitations. Pt remembers the event (11 Jan 2023 08:12)      PAST MEDICAL & SURGICAL HISTORY:  Anxiety      Depression      Diverticulitis      Macular degeneration  right eye      MVP (mitral valve prolapse)      OA (osteoarthritis)  knee and hip      Hyperlipidemia      Status post cholecystectomy      S/P left cataract extraction  right and left              MEDICATIONS:  amLODIPine   Tablet 2.5 milliGRAM(s) Oral daily  metoprolol succinate ER 25 milliGRAM(s) Oral daily        acetaminophen     Tablet .. 975 milliGRAM(s) Oral every 8 hours  FLUoxetine Solution 5 milliGRAM(s) Oral daily  LORazepam     Tablet 0.5 milliGRAM(s) Oral at bedtime PRN  melatonin 3 milliGRAM(s) Oral at bedtime PRN  ondansetron Injectable 4 milliGRAM(s) IV Push every 8 hours PRN  oxyCODONE    IR 5 milliGRAM(s) Oral every 6 hours PRN  oxyCODONE    IR 2.5 milliGRAM(s) Oral every 4 hours PRN    aluminum hydroxide/magnesium hydroxide/simethicone Suspension 30 milliLiter(s) Oral every 4 hours PRN  polyethylene glycol 3350 17 Gram(s) Oral daily      chlorhexidine 2% Cloths 1 Application(s) Topical <User Schedule>      FAMILY HISTORY:  No pertinent family history in first degree relatives        Non-contributory    SOCIAL HISTORY:    [ ] Tobacco  [ ] Drugs  [ ] Alcohol    Allergies    No Known Allergies    Intolerances    	    REVIEW OF SYSTEMS:  CONSTITUTIONAL: No fever  EYES: No eye pain, visual disturbances, or discharge  ENMT:  No difficulty hearing, tinnitus  NECK: No pain or stiffness  RESPIRATORY: No cough, wheezing,  CARDIOVASCULAR: No chest pain, palpitations, passing out, dizziness, or leg swelling  GASTROINTESTINAL:  No nausea, vomiting, diarrhea or constipation. No melena.  GENITOURINARY: No dysuria, hematuria  NEUROLOGICAL: No stroke like symptoms  SKIN: No burning or lesions   ENDOCRINE: No heat or cold intolerance  MUSCULOSKELETAL: No joint pain or swelling  PSYCHIATRIC: No  anxiety, mood swings  HEME/LYMPH: No bleeding gums  ALLERGY AND IMMUNOLOGIC: No hives or eczema	    All other ROS negative    PHYSICAL EXAM:  T(C): 36.6 (01-12-23 @ 11:12), Max: 36.6 (01-11-23 @ 19:45)  HR: 80 (01-12-23 @ 11:12) (80 - 90)  BP: 116/68 (01-12-23 @ 11:12) (101/63 - 134/84)  RR: 16 (01-12-23 @ 11:12) (16 - 18)  SpO2: 97% (01-12-23 @ 11:13) (87% - 97%)  Wt(kg): --  I&O's Summary      Appearance: Normal	  HEENT:   Normal oral mucosa, EOMI	  Cardiovascular:  S1 S2, No JVD,    Respiratory: Lungs clear to auscultation	  Psychiatry: Alert  Gastrointestinal:  Soft, Non-tender, + BS	  Skin: No rashes   Neurologic: Non-focal  Extremities:  No edema  Vascular: Peripheral pulses palpable    	    	  	  CARDIAC MARKERS:  Labs personally reviewed by me                                  11.4   7.96  )-----------( 165      ( 12 Jan 2023 07:10 )             37.0     01-12    139  |  102  |  17  ----------------------------<  88  3.8   |  24  |  0.66    Ca    9.2      12 Jan 2023 07:10    TPro  6.6  /  Alb  4.0  /  TBili  0.6  /  DBili  x   /  AST  28  /  ALT  19  /  AlkPhos  52  01-10          EKG: Personally reviewed by me -   Radiology: Personally reviewed by me -       Assessment /Plan:     New systolic HF compared to echo done at [facility] 2 months ago  She is CP free with no SOB  She likely has underlying non revascularized CAD  High risk for mod risk non-elective needed orthopedic surgery    Outpt follow up for further cardiac work up         Differential diagnosis and plan of care discussed with patient after the evaluation. Counseling on diet, nutritional counseling, weight management, exercise and medication compliance was done.   Advanced care planning/advanced directives discussed with patient/family. DNR status including forceful chest compressions to attempt to restart the heart, ventilator support/artificial breathing, electric shock, artificial nutrition, health care proxy, Molst form all discussed with pt. Pt wishes to consider. More than fifteen minutes spent on discussing advanced directives.   **** minutes spent on encounter, of which more than fifty percent of the encounter was spent counseling and/or coordinating care by the attending physician        Clyde Anguiano DO [facility]  Cardiovascular Medicine  [address] [place] [address], [address]  Office [phone]  Available via call/text on [employer] Teams DATE OF SERVICE: 01-12-23 @ 15:33    CHIEF COMPLAINT:Patient is a 88y old  Female who presents with a chief complaint of Fall (12 Jan 2023 12:25)      HISTORY OF PRESENT ILLNESS:HPI:  This is a 87 y/o female with h/o HTN, PE 2/2 recent covid infection, anxiety/depression who presented after a fall. Pt was in her usual state of health when walking at home with her cane. She saw a cracker on the floor and bent down to pick it up. Normally she holds on to something when doing this but this time she did not and ended up falling. Pt was unable to get up, did not have her phone with her and remained on the floor until her daughter got home as she was home alone. Stated she had pain in her hip region after the fall. Denied LOC, chest pain , SOB, palpitations. Pt remembers the event (11 Jan 2023 08:12)      PAST MEDICAL & SURGICAL HISTORY:  Anxiety      Depression      Diverticulitis      Macular degeneration  right eye      MVP (mitral valve prolapse)      OA (osteoarthritis)  knee and hip      Hyperlipidemia      Status post cholecystectomy      S/P left cataract extraction  right and left              MEDICATIONS:  amLODIPine   Tablet 2.5 milliGRAM(s) Oral daily  metoprolol succinate ER 25 milliGRAM(s) Oral daily        acetaminophen     Tablet .. 975 milliGRAM(s) Oral every 8 hours  FLUoxetine Solution 5 milliGRAM(s) Oral daily  LORazepam     Tablet 0.5 milliGRAM(s) Oral at bedtime PRN  melatonin 3 milliGRAM(s) Oral at bedtime PRN  ondansetron Injectable 4 milliGRAM(s) IV Push every 8 hours PRN  oxyCODONE    IR 5 milliGRAM(s) Oral every 6 hours PRN  oxyCODONE    IR 2.5 milliGRAM(s) Oral every 4 hours PRN    aluminum hydroxide/magnesium hydroxide/simethicone Suspension 30 milliLiter(s) Oral every 4 hours PRN  polyethylene glycol 3350 17 Gram(s) Oral daily      chlorhexidine 2% Cloths 1 Application(s) Topical <User Schedule>      FAMILY HISTORY:  No pertinent family history in first degree relatives        Non-contributory    SOCIAL HISTORY:    [ ] not a smoker    Allergies    No Known Allergies    Intolerances    	    REVIEW OF SYSTEMS:  CONSTITUTIONAL: No fever  EYES: No eye pain, visual disturbances, or discharge  ENMT:  No difficulty hearing, tinnitus  NECK: No pain or stiffness  RESPIRATORY: No cough, wheezing,  CARDIOVASCULAR: No chest pain, palpitations, passing out, dizziness, or leg swelling  GASTROINTESTINAL:  No nausea, vomiting, diarrhea or constipation. No melena.  GENITOURINARY: No dysuria, hematuria  NEUROLOGICAL: No stroke like symptoms  SKIN: No burning or lesions   ENDOCRINE: No heat or cold intolerance  MUSCULOSKELETAL: See HPI  PSYCHIATRIC: No  anxiety, mood swings  HEME/LYMPH: No bleeding gums  ALLERGY AND IMMUNOLOGIC: No hives or eczema	    All other ROS negative    PHYSICAL EXAM:  T(C): 36.6 (01-12-23 @ 11:12), Max: 36.6 (01-11-23 @ 19:45)  HR: 80 (01-12-23 @ 11:12) (80 - 90)  BP: 116/68 (01-12-23 @ 11:12) (101/63 - 134/84)  RR: 16 (01-12-23 @ 11:12) (16 - 18)  SpO2: 97% (01-12-23 @ 11:13) (87% - 97%)  Wt(kg): --  I&O's Summary      Appearance: Normal	  HEENT:   Normal oral mucosa, EOMI	  Cardiovascular:  S1 S2, No JVD,    Respiratory: Lungs clear to auscultation	  Psychiatry: Alert  Gastrointestinal:  Soft, Non-tender, + BS	  Skin: No rashes   Neurologic: Non-focal  Extremities:  No edema  Vascular: Peripheral pulses palpable    	    	  	  CARDIAC MARKERS:  Labs personally reviewed by me                                  11.4   7.96  )-----------( 165      ( 12 Jan 2023 07:10 )             37.0     01-12    139  |  102  |  17  ----------------------------<  88  3.8   |  24  |  0.66    Ca    9.2      12 Jan 2023 07:10    TPro  6.6  /  Alb  4.0  /  TBili  0.6  /  DBili  x   /  AST  28  /  ALT  19  /  AlkPhos  52  01-10          EKG: Personally reviewed by me - SR LBBB  Radiology: Personally reviewed by me -       < from: CT Angio Chest PE Protocol w/ IV Cont (01.11.23 @ 06:04) >  IMPRESSION:    1.  Evaluation limited by motion artifact. No pulmonary thromboembolism   to the level lobar of the pulmonary artery    2.  New mild thoracic lymphadenopathy    3.  Partially visualized indeterminate left adrenal mass appears similar   as compared with 8/5/2017    < end of copied text >      < from: TTE with Doppler (w/Cont) (01.11.23 @ 09:27) >  Conclusions:  1. Mitral annular calcification and calcified mitral  leaflets with decreased diastolic opening. Mild mitral  regurgitation.  Peak mitral valve gradient equals 13 mm Hg,  mean transmitral valve gradient equals 6 mm Hg, consistent  with moderate mitral stenosis. Gradients measured at a HR  of 105bpm, consider re-evlaution when tachycardia has  resolved.  2. Calcified aortic valve. Peak transaortic valve gradient  equals 10 mm Hg, mean transaortic valve gradient equals 6  mm Hg, estimated aortic valve area equals 1.8 sqcm (by  continuity equation), aortic valve velocity time integral  equals 31 cm, consistent with mild aortic stenosis. Minimal  aortic regurgitation.  3. Moderate segmental left ventricular systolic  dysfunction EF 35%. Endocardial visualization enhanced with  intravenous injection of Ultrasonic Enhancing Agent  (Definity). No left ventricular thrombus. The apical  inferior wall, the apical anterior wall, the apical septum,  the basal anterolateral wall, the mid anterior wall, and  the mid anterolateral wall are hypokinetic.  4. Severe reversible diastolic dysfunction (Stage III).  5. Normal right ventricular size and function.  *** No previous Echo exam.    < end of copied text >      Assessment and Plan:   · Assessment	  88-year-old female past medical history of PE on Xarelto, anxiety, depression, hyperlipidemia, MVP who presented after a fall at home found to have impacted left femoral neck fracture    Problem/Plan - 1:  ·  Problem: New Systolic HF  ·  Plan: Troponin level elevated on admission, repeats essentially flat. Likely elevated in setting of systolic HF with EF 35% and downtrending from MI in late November   - [facility] hospitalization records obtained from November 24, 2022  - Admitted there with chest pain, found to have as noted on CTA report "small volume PE in the left upper lobe segmental branches and ? RLL subsegmental branches"  - Trop noted to be 1597 but was never trended to peak,   EKG there with LBBB  - I suspect her presentation there was consistent with ?MI (small PE shouldn't mount a trop og 1597 or likely higher) with now resultant drop in EF now  - She has been chest pain free since her hospitalization and not SOB  - Will need to initiate GDMT for sHF post op    Problem/Plan - 2:  ·  Problem: Preop Risk Stratification  ·  Plan: Given recent nonvascularized MI with sHF she at high risk for mod risk non-elective urgent ORIF  - discussed with daughter that pt is high risk for torrey-operative CV events including MI and death      Problem/Plan - 3:  ·  Problem: Pulmonary embolism.   ·  Plan:  Hold Xarelto in prep for possible OR  - Outpatient heme f/u.    Problem/Plan - 4:  ·  Problem: Essential hypertension.   ·  Plan: BP overall acceptable   - c/w Norvasc and Metoprolol.            Differential diagnosis and plan of care discussed with patient after the evaluation. Counseling on diet, nutritional counseling, weight management, exercise and medication compliance was done.   Advanced care planning/advanced directives discussed with patient/family. DNR status including forceful chest compressions to attempt to restart the heart, ventilator support/artificial breathing, electric shock, artificial nutrition, health care proxy, Molst form all discussed with pt. Pt wishes to consider. More than fifteen minutes spent on discussing advanced directives.   **** minutes spent on encounter, of which more than fifty percent of the encounter was spent counseling and/or coordinating care by the attending physician        Clyde Anguiano DO [facility]  Cardiovascular Medicine  [address] [place] [address], [address]  Office [phone]  Available via call/text on [employer] Teams

## 2023-01-13 NOTE — OCCUPATIONAL THERAPY INITIAL EVALUATION ADULT - MD ORDER
OT Initial Eval & Treat  Activity: LLE WBAT, abd pillow
OT treat and eval   Activity: LLE WBAT, posterior precautions

## 2023-01-13 NOTE — PHYSICAL THERAPY INITIAL EVALUATION ADULT - GENERAL OBSERVATIONS, REHAB EVAL
Pt rec'd semisupine in bed, in NAD, +IVL, +abduction pillow, dtr present. Agreeable to PT. RN cleared pt to be seen.

## 2023-01-13 NOTE — PHYSICAL THERAPY INITIAL EVALUATION ADULT - GAIT DEVIATIONS NOTED, PT EVAL
no decreased elena/increased time in double stance/decreased step length/decreased stride length/decreased weight-shifting ability

## 2023-01-13 NOTE — PROGRESS NOTE ADULT - SUBJECTIVE AND OBJECTIVE BOX
DATE OF SERVICE: 01-13-23 @ 14:45    Patient is a 88y old  Female who presents with a chief complaint of Fall (13 Jan 2023 10:20)      INTERVAL HISTORY: Feels ok.     REVIEW OF SYSTEMS:  CONSTITUTIONAL: No weakness  EYES/ENT: No visual changes;  No throat pain   NECK: No pain or stiffness  RESPIRATORY: No cough, wheezing; No shortness of breath  CARDIOVASCULAR: No chest pain or palpitations  GASTROINTESTINAL: No abdominal  pain. No nausea, vomiting, or hematemesis  GENITOURINARY: No dysuria, frequency or hematuria  NEUROLOGICAL: No stroke like symptoms  SKIN: No rashes      	  MEDICATIONS:        PHYSICAL EXAM:  T(C): 36.6 (01-13-23 @ 11:08), Max: 36.7 (01-12-23 @ 15:42)  HR: 75 (01-13-23 @ 11:08) (75 - 90)  BP: 119/70 (01-13-23 @ 11:08) (113/64 - 148/72)  RR: 18 (01-13-23 @ 11:08) (16 - 18)  SpO2: 93% (01-13-23 @ 11:08) (93% - 97%)  Wt(kg): --  I&O's Summary    12 Jan 2023 07:01  -  13 Jan 2023 07:00  --------------------------------------------------------  IN: 610 mL / OUT: 250 mL / NET: 360 mL      Height (cm): 167.6 (01-13 @ 11:08)  Weight (kg): 65.8 (01-13 @ 11:08)  BMI (kg/m2): 23.4 (01-13 @ 11:08)  BSA (m2): 1.74 (01-13 @ 11:08)    Appearance: In no distress	  HEENT:    PERRL, EOMI	  Cardiovascular:  S1 S2, No JVD  Respiratory: Lungs clear to auscultation	  Gastrointestinal:  Soft, Non-tender, + BS	  Vascularature:  No edema of LE  Psychiatric: Appropriate affect   Neuro: no acute focal deficits                               11.0   7.46  )-----------( 188      ( 13 Jan 2023 07:27 )             34.9     01-13    139  |  103  |  18  ----------------------------<  94  3.8   |  23  |  0.56    Ca    8.7      13 Jan 2023 07:25          Labs personally reviewed      ASSESSMENT/PLAN: 	    88-year-old female past medical history of PE on Xarelto, anxiety, depression, hyperlipidemia, MVP who presented after a fall at home found to have impacted left femoral neck fracture    Problem/Plan - 1:  ·  Problem: New Systolic HF  ·  Plan: Troponin level elevated on admission, repeats essentially flat. Likely elevated in setting of systolic HF with EF 35% and downtrending from MI in late November   - [facility] hospitalization records obtained from November 24, 2022  - Admitted there with chest pain, found to have as noted on CTA report "small volume PE in the left upper lobe segmental branches and ? RLL subsegmental branches"  - Trop noted to be 1597 but was never trended to peak,   EKG there with LBBB  - I suspect her presentation there was consistent with ?MI (small PE shouldn't mount a trop og 1597 or likely higher) with now resultant drop in EF now  - She has been chest pain free since her hospitalization and not SOB  - Will need to initiate GDMT for sHF post op    Problem/Plan - 2:  ·  Problem: Preop Risk Stratification  ·  Plan: Given recent nonvascularized MI with sHF she at high risk for mod risk non-elective urgent ORIF  - discussed with daughter that pt is high risk for torrey-operative CV events including MI and death      Problem/Plan - 3:  ·  Problem: Pulmonary embolism.   ·  Plan:  Hold Xarelto in prep for possible OR  - Outpatient heme f/u.    Problem/Plan - 4:  ·  Problem: Essential hypertension.   ·  Plan: BP overall acceptable   - c/w Norvasc and Metoprolol.          Raysa Vásquez, LINA-NP   Clyde Anguiano DO [facility]  Cardiovascular Medicine  [address] [place] [address], [address]  Available through call or text on [employer] TEAMs  Office: [phone]

## 2023-01-13 NOTE — OCCUPATIONAL THERAPY INITIAL EVALUATION ADULT - LIVES WITH, PROFILE
Pt has 0 steps to enter private house and has a walk-in shower/children Pt has 0 steps to enter private house and has a walk-in shower, Pt has full flight of stairs to bedroom/children

## 2023-01-13 NOTE — CHART NOTE - NSCHARTNOTEFT_GEN_A_CORE
Post-Operative Check    Pt evaluated in RR resting without complaints. Pt states pain is well tolerated. No Chest Pain, SOB, N/V.    Vitals:  T(C): 36.2 (01-13-23 @ 17:00), Max: 36.7 (01-12-23 @ 21:05)  HR: 85 (01-13-23 @ 17:45) (75 - 90)  BP: 147/66 (01-13-23 @ 17:45) (119/70 - 155/70)  RR: 17 (01-13-23 @ 17:45) (16 - 18)  SpO2: 95% (01-13-23 @ 17:45) (93% - 100%)    Exam:  Alert and mildly confused, No Acute Distress. VSS.   Laterality: LLE     Aquacel dressing is clean, dry and intact.      (+) PF/DF/EHL/FHL 5/5     Sensation intact to light touch      2+ DP pulse  Calves soft, non-tender bilaterally    Xray: < from: Xray Pelvis AP only (01.13.23 @ 15:39) >  IMPRESSION:     Left hip hemiarthroplasty prosthesis with cemented femoral stem implanted.     Intact and aligned hardware and no periprosthetic fractures.     Postoperative soft tissue changes.     Correlate with intraoperative findings.    Labs:                      11.3   13.43 )-----------( 222      ( 13 Jan 2023 16:28 )             34.9    01-13    137  |  100  |  17  ----------------------------<  118<H>  4.2   |  22  |  0.52    Ca    8.4      13 Jan 2023 16:28    A/P: 88y Female s/p Left hemiarthroplasty. VSS. NAD  -PT/OT- WBAT/OOB With Posterior Hip Precautions  -IS bedside   -Ice\elevation  -DVT PPx: per primary team  -Pain Control  -Continue post-op abx x 24hrs  -f/u AM labs.   -Continue Current Tx per primary team  -Dispo planning: per primary team    Cale Mcfadden PA-C  Orthopedic Surgery Team  Team Pager #[phone]

## 2023-01-13 NOTE — PHYSICAL THERAPY INITIAL EVALUATION ADULT - PERTINENT HX OF CURRENT PROBLEM, REHAB EVAL
87 y/o female with h/o HTN, PE 2/2 recent covid infection, anxiety/depression who presented after a fall. Pt was in her usual state of health when walking at home with her cane. She saw a cracker on the floor and bent down to pick it up. Normally she holds on to something when doing this but this time she did not and ended up falling. Pt was unable to get up, did not have her phone with her and remained on the floor until her daughter got home as she was home alone. Stated she had pain in her hip region after the fall. Denied LOC, chest pain , SOB, palpitations. Pt remembers the event. + L femoral neck fracture. now s/p Left hemiarthroplasty (with posterior hip precautions) 1/13.

## 2023-01-13 NOTE — PHYSICAL THERAPY INITIAL EVALUATION ADULT - NSPTDMEREC_GEN_A_CORE
If patient goes home, would benefit from HPT, RW,3:1 commode 2/2 decreased gait bhanu, assistance w/all transfers and ADLS

## 2023-01-13 NOTE — OCCUPATIONAL THERAPY INITIAL EVALUATION ADULT - GENERAL OBSERVATIONS, REHAB EVAL
Pt received semi-supine, +IV line, +external female cath, +abduction pillow with daughter at bedside

## 2023-01-13 NOTE — OCCUPATIONAL THERAPY INITIAL EVALUATION ADULT - NSOTDISCHREC_GEN_A_CORE
TBD pending further functional evaluation TBD pending further functional evaluation 1/15:[facility]/Sub-acute Rehab

## 2023-01-13 NOTE — PHYSICAL THERAPY INITIAL EVALUATION ADULT - PLANNED THERAPY INTERVENTIONS, PT EVAL
balance training/bed mobility training/gait training/strengthening/transfer training GOAL: Patient will be able to negotiate 12 steps in 2 weeks/balance training/bed mobility training/gait training/strengthening/transfer training

## 2023-01-13 NOTE — PRE-ANESTHESIA EVALUATION ADULT - NSANTHOSAYNRD_GEN_A_CORE
No. TICO screening performed.  STOP BANG Legend: 0-2 = LOW Risk; 3-4 = INTERMEDIATE Risk; 5-8 = HIGH Risk

## 2023-01-13 NOTE — PHYSICAL THERAPY INITIAL EVALUATION ADULT - TRANSFER SAFETY CONCERNS NOTED: SIT/STAND, REHAB EVAL
decreased step length/decreased weight-shifting ability decreased sequencing ability/decreased step length/decreased weight-shifting ability

## 2023-01-13 NOTE — PROGRESS NOTE ADULT - SUBJECTIVE AND OBJECTIVE BOX
Patient is a 88y old  Female who presents with a chief complaint of Fall (2023 06:22)      SUBJECTIVE / OVERNIGHT EVENTS:    87 y/o female with h/o HTN, PE 2/2 recent covid infection, anxiety/depression who presented after a fall. Pt was in her usual state of health when walking at home with her cane. She saw a cracker on the floor and bent down to pick it up. Normally she holds on to something when doing this but this time she did not and ended up falling. Pt was unable to get up, did not have her phone with her and remained on the floor until her daughter got home as she was home alone. Stated she had pain in her hip region after the fall. Denied LOC, chest pain , SOB, palpitations.  Patient was found to have Left femoral neck fracture with plan for surgery by Ortho team on  .  POs Ua       ADDITIONAL REVIEW OF SYSTEMS: Negative except for above    MEDICATIONS  (STANDING):  acetaminophen     Tablet .. 975 milliGRAM(s) Oral every 8 hours  amLODIPine   Tablet 2.5 milliGRAM(s) Oral daily  chlorhexidine 2% Cloths 1 Application(s) Topical <User Schedule>  FLUoxetine Solution 5 milliGRAM(s) Oral daily  metoprolol succinate ER 25 milliGRAM(s) Oral daily  polyethylene glycol 3350 17 Gram(s) Oral daily  sodium chloride 0.9%. 1000 milliLiter(s) (50 mL/Hr) IV Continuous <Continuous>    MEDICATIONS  (PRN):  aluminum hydroxide/magnesium hydroxide/simethicone Suspension 30 milliLiter(s) Oral every 4 hours PRN Dyspepsia  LORazepam     Tablet 0.5 milliGRAM(s) Oral at bedtime PRN Anxiety  melatonin 3 milliGRAM(s) Oral at bedtime PRN Insomnia  ondansetron Injectable 4 milliGRAM(s) IV Push every 8 hours PRN Nausea and/or Vomiting  oxyCODONE    IR 5 milliGRAM(s) Oral every 6 hours PRN Severe Pain (7 - 10)  oxyCODONE    IR 2.5 milliGRAM(s) Oral every 4 hours PRN Moderate Pain (4 - 6)      CAPILLARY BLOOD GLUCOSE        I&O's Summary    2023 07:01  -  2023 07:00  --------------------------------------------------------  IN: 610 mL / OUT: 250 mL / NET: 360 mL        PHYSICAL EXAM:  Vital Signs Last 24 Hrs  T(C): 36.6 (2023 08:17), Max: 36.7 (2023 15:42)  T(F): 97.8 (2023 08:17), Max: 98.1 (2023 05:29)  HR: 75 (2023 08:17) (75 - 90)  BP: 119/70 (2023 08:17) (113/64 - 148/72)  BP(mean): --  RR: 18 (2023 08:17) (16 - 18)  SpO2: 93% (2023 08:17) (87% - 97%)    Parameters below as of 2023 08:17  Patient On (Oxygen Delivery Method): room air        PHYSICAL EXAM:  GENERAL: NAD, well-developed  HEAD:  Atraumatic, Normocephalic  EYES:  conjunctiva and sclera clear  NECK: Supple, No JVD  CHEST/LUNG: Clear to auscultation bilaterally; No wheeze  HEART: Regular rate and rhythm; No murmurs, rubs, or gallops  ABDOMEN: Soft, Nontender, Nondistended; Bowel sounds present  EXTREMITIES:  2+ Peripheral Pulses, No clubbing, cyanosis, or edema  PSYCH: AAOx3  NEUROLOGY: non-focal        LABS:                        11.0   7.46  )-----------( 188      ( 2023 07:27 )             34.9     01-13    139  |  103  |  18  ----------------------------<  94  3.8   |  23  |  0.56    Ca    8.7      2023 07:25      PT/INR - ( 2023 07:28 )   PT: 15.0 sec;   INR: 1.29 ratio         PTT - ( 2023 00:50 )  PTT:26.7 sec      Urinalysis Basic - ( 2023 05:30 )    Color: Yellow / Appearance: Slightly Turbid / S.027 / pH: x  Gluc: x / Ketone: Moderate  / Bili: Negative / Urobili: Negative   Blood: x / Protein: 100 mg/dl / Nitrite: Positive   Leuk Esterase: Large / RBC: 4 /hpf /  /HPF   Sq Epi: x / Non Sq Epi: 1 /hpf / Bacteria: Many          RADIOLOGY & ADDITIONAL TESTS:    Imaging Personally Reviewed:    Electrocardiogram Personally Reviewed:    COORDINATION OF CARE:  Care Discussed with Consultants/Other Providers [Y/N]:  Prior or Outpatient Records Reviewed [Y/N]:

## 2023-01-13 NOTE — PRE-ANESTHESIA EVALUATION ADULT - NSRADCARDRESULTSFT_GEN_ALL_CORE
< from: TTE with Doppler (w/Cont) (01.11.23 @ 09:27) >    Dimensions:    Normal Values:  LA:     3.9    2.0 - 4.0 cm  Ao:     2.9    2.0 - 3.8 cm  SEPTUM: 0.9    0.6 - 1.2 cm  PWT:    1.10.6 - 1.1 cm  LVIDd:  5.3    3.0 - 5.6 cm  LVIDs:  3.9    1.8 - 4.0 cm  Derived variables:  LVMI: 115 g/m2  RWT: 0.41  Fractional short: 26 %  EF (Modified Tomlinson Rule): 35 %Doppler Peak Velocity  (m/sec): MV=1.8 AoV=1.6  ------------------------------------------------------------------------  Observations:  Mitral Valve: Mitral annular calcification and calcified  mitral leaflets with decreased diastolic opening. Mild  mitral regurgitation.  Peak mitral valve gradient equals 13  mm Hg, mean transmitral valve gradient equals 6 mm Hg,  consistent with moderate mitral stenosis. Gradients  measured at a HR of 105bpm, consider re-evlaution when  tachycardia has resolved.  Aortic Valve/Aorta: Calcified aortic valve. Peak  transaortic valve gradient equals 10 mm Hg, mean  transaortic valve gradient equals 6 mm Hg, estimated aortic  valve area equals 1.8 sqcm (by continuity equation), aortic  valve velocity time integral equals 31 cm, consistent with  mild aortic stenosis. Minimal aortic regurgitation.  Peak  left ventricular outflow tract gradient equals 4 mm Hg,  mean gradient is equal to 2 mm Hg, LVOT velocity time  integral equals 13 cm.  Aortic Root: 2.9 cm.  LVOT diameter: 1.9 cm.  Left Atrium: Moderately dilated left atrium.  LA volume  index = 46 cc/m2.  Left Ventricle: Moderate segmental left ventricular  systolic dysfunction. Endocardial visualization enhanced  with intravenous injection of Ultrasonic Enhancing Agent  (Definity). No left ventricular thrombus. The apical  inferior wall, the apical anterior wall, the apical septum,  the basal anterolateral wall, the mid anterior wall, and  the mid anterolateral wall are hypokinetic.  Normal left  ventricular internal dimensions and wall thicknesses.  Severe reversible diastolic dysfunction (Stage III).  Right Heart: Normal right atrium. Normal right ventricular  size and function. Normal tricuspid valve. Mild tricuspid  regurgitation. Normal pulmonic valve. Minimal pulmonic  regurgitation.  Pericardium/Pleura: Thickenedpericardium with trace  pericardial effusion.  Hemodynamic: Estimated right atrial pressure is 8 mm Hg.  Estimated right ventricular systolic pressure equals 27 mm  Hg, assuming right atrial pressure equals 8 mm Hg,  consistent with normal pulmonary pressures.  ------------------------------------------------------------------------  Conclusions:  1. Mitral annular calcification and calcified mitral  leaflets with decreased diastolic opening. Mild mitral  regurgitation.  Peak mitral valve gradient equals 13 mm Hg,  mean transmitral valve gradient equals 6 mm Hg, consistent  with moderate mitral stenosis. Gradients measured at a HR  of 105bpm, consider re-evlaution when tachycardia has  resolved.  2. Calcified aortic valve. Peak transaortic valve gradient  equals 10 mm Hg, mean transaortic valve gradient equals 6  mm Hg, estimated aortic valve area equals 1.8 sqcm (by  continuity equation), aortic valve velocity time integral  equals 31 cm, consistent with mild aortic stenosis. Minimal  aortic regurgitation.  3. Moderate segmental left ventricular systolic  dysfunction. Endocardial visualization enhanced with  intravenous injection of Ultrasonic Enhancing Agent  (Definity). No left ventricular thrombus. The apical  inferior wall, the apical anterior wall, the apical septum,  the basal anterolateral wall, the mid anterior wall, and  the mid anterolateral wall are hypokinetic.  4. Severe reversible diastolic dysfunction (Stage III).  5. Normal right ventricular size and function.  *** No previous Echo exam.  ------------------------------------------------------------------------  Confirmed on  1/11/2023 - 21:37:32 by Azael Daily M.D.  ------------------------------------------------------------------------    < end of copied text >

## 2023-01-13 NOTE — PHYSICAL THERAPY INITIAL EVALUATION ADULT - ADDITIONAL COMMENTS
Pt lives in a house with dtr. Has no stairs to enter and ~24 stairs inside (+HR) to bedroom. Dtr reports 1st floor can be arranged for pt, however only half bathroom on main level. Reports being independent with functional mobility/ADLs with cane/RW. Owns RW, cane, shower chair, and commode.

## 2023-01-13 NOTE — PROGRESS NOTE ADULT - NSPROGADDITIONALINFOA_GEN_ALL_CORE
MULTIPLE ABD NODULES ---> WILL NEED OUT OR IN PATIENT EVAL   ABnormal U/A ---> Start CEFTRIAXONE as there is plan for surg , f/up UCX  ( UCX done in 2/3/21 had GAYLE Negron

## 2023-01-13 NOTE — PROGRESS NOTE ADULT - SUBJECTIVE AND OBJECTIVE BOX
Patient seen and examined at bedside.  No acute complaints at this time. Pain well controlled. Denies chest pain, shortness of breath, nausea or vomiting. Plan for OR today for left femoral neck fx    PE:  Vital Signs Last 24 Hrs  T(C): 36.7 (01-13-23 @ 05:29), Max: 36.7 (01-12-23 @ 15:42)  T(F): 98.1 (01-13-23 @ 05:29), Max: 98.1 (01-13-23 @ 05:29)  HR: 84 (01-13-23 @ 05:29) (79 - 90)  BP: 148/72 (01-13-23 @ 05:29) (113/64 - 148/72)  BP(mean): --  RR: 18 (01-13-23 @ 05:29) (16 - 18)  SpO2: 95% (01-13-23 @ 05:29) (87% - 97%)    General: NAD, resting comfortably in bed  LLE:   SCDs present bilaterally  Compartments soft and compressible  No calf tenderness bilaterally  +TA/EHL/FHL/GSC  SILT L3-S1  + DP/PT                            10.6   7.46  )-----------( 194      ( 13 Jan 2023 00:50 )             32.9     01-13    138  |  102  |  19  ----------------------------<  105<H>  3.9   |  25  |  0.63    Ca    8.7      13 Jan 2023 00:50      PT/INR - ( 13 Jan 2023 00:50 )   PT: 15.9 sec;   INR: 1.38 ratio         PTT - ( 13 Jan 2023 00:50 )  PTT:26.7 sec    A/P:  88y f w/ L femoral neck fx     -Plan for OR today 1/13 w/ Dr. Gatica, 1200 for L hip gadiel arthroplasty   -NPO for OR   -IVF while NPO  -Pain Control  -HOLD DVT ppx for OR 1/13  -FU AM Labs  -Rest, ice, compress and elevate the extremity as we needed  -Incentive Spirometry  -Medical co-management appreciated  -Cards mgmt appreciated  -Please continue to document optimization / risk stratification for OR

## 2023-01-13 NOTE — PRE-ANESTHESIA EVALUATION ADULT - NSANTHPMHFT_GEN_ALL_CORE
88F HTN HLD MI CHF (EF 35%) PE on Xarelto in the setting of recent COVID19, depression anxiety with L femoral fracture s/p fall. 88F HTN HLD MI CHF (EF 35%) PE on Xarelto in the setting of recent COVID19, depression anxiety with L femoral fracture s/p fall.     Grossly positive UA from this AM.

## 2023-01-14 NOTE — DIETITIAN INITIAL EVALUATION ADULT - NSFNSNUTRCHEWSWALLOWFT_GEN_A_CORE
Pt denies swallowing issues, however endorses some chewing issues secondary to missing teeth (declined altered textured diet).

## 2023-01-14 NOTE — DIETITIAN INITIAL EVALUATION ADULT - PERTINENT LABORATORY DATA
01-14    139  |  101  |  20  ----------------------------<  115<H>  4.5   |  23  |  0.54    Ca    8.2<L>      14 Jan 2023 06:40

## 2023-01-14 NOTE — DIETITIAN INITIAL EVALUATION ADULT - OTHER INFO
Wt Hx:   Dosing wt 65.8 kG/145 lbs.   UBW ~150 lbs; denies any changes in wt PTA. RD obtained wt: ~150 lbs.   Ht per pt: 68 inches   IBW:  140 lbs  IBW%: 104%    Nutrition-Related Concerns:   - s/p left hip hemiarthroplasty POD#1

## 2023-01-14 NOTE — DIETITIAN INITIAL EVALUATION ADULT - PERTINENT MEDS FT
MEDICATIONS  (STANDING):  acetaminophen     Tablet .. 975 milliGRAM(s) Oral every 8 hours  FLUoxetine Solution 5 milliGRAM(s) Oral daily  metoprolol succinate ER 25 milliGRAM(s) Oral daily  polyethylene glycol 3350 17 Gram(s) Oral daily  rivaroxaban 20 milliGRAM(s) Oral with dinner    MEDICATIONS  (PRN):  acetaminophen   IVPB .. 1000 milliGRAM(s) IV Intermittent once PRN Severe Pain (7 - 10)  melatonin 3 milliGRAM(s) Oral at bedtime PRN Insomnia  ondansetron Injectable 4 milliGRAM(s) IV Push every 8 hours PRN Nausea and/or Vomiting  oxyCODONE    IR 5 milliGRAM(s) Oral every 6 hours PRN Severe Pain (7 - 10)  oxyCODONE    IR 2.5 milliGRAM(s) Oral every 4 hours PRN Moderate Pain (4 - 6)  traMADol 50 milliGRAM(s) Oral every 6 hours PRN Mild Pain (1 - 3)

## 2023-01-14 NOTE — PROGRESS NOTE ADULT - SUBJECTIVE AND OBJECTIVE BOX
Patient is a 88y old  Female who presents with a chief complaint of Fall, resulting in Left hip fracture  Patient s/p left hip hemiarthroplasty POD#1  Patient comfortable  No complaints    T(C): 36.3 (01-14-23 @ 07:54), Max: 36.9 (01-13-23 @ 20:15)  HR: 75 (01-14-23 @ 07:54) (75 - 88)  BP: 110/69 (01-14-23 @ 07:54) (110/69 - 155/70)  RR: 18 (01-14-23 @ 07:54) (16 - 18)  SpO2: 93% (01-14-23 @ 07:54) (93% - 100%)    PHYSICAL EXAM:  NAD, Alert  [Left ] Hip: Dressing C/D/I; sensation grossly intact to light touch; (+) DF/PF; (+) Distal Pulses; No Calf tenderness B/L, PAS     LABS:                      11.1   9.46  )-----------( 234      ( 14 Jan 2023 06:40 )             34.4   01-14  139  |  101  |  20  ----------------------------<  115<H>  4.5   |  23  |  0.54  Ca    8.2<L>      14 Jan 2023 06:40  PT/INR - ( 13 Jan 2023 07:28 )   PT: 15.0 sec;   INR: 1.29 ratio     PTT - ( 13 Jan 2023 00:50 )  PTT:26.7 sec

## 2023-01-14 NOTE — PROGRESS NOTE ADULT - SUBJECTIVE AND OBJECTIVE BOX
Patient is a 88y old  Female who presents with a chief complaint of Fracture of unspecified part of neck of left femur, initial encounter for closed fracture (2023 13:20)      SUBJECTIVE / OVERNIGHT EVENTS:  T(F): Max: 98.4 (2023 20:15)  HR: 77  (75 - 88)  BP: 116/63 (110/69 - 155/70)  SpO2: 93%  (93% - 100%)  NO overnight events reported       ADDITIONAL REVIEW OF SYSTEMS: Negative except for above    MEDICATIONS  (STANDING):  acetaminophen     Tablet .. 975 milliGRAM(s) Oral every 8 hours  FLUoxetine Solution 5 milliGRAM(s) Oral daily  metoprolol succinate ER 25 milliGRAM(s) Oral daily  polyethylene glycol 3350 17 Gram(s) Oral daily  rivaroxaban 20 milliGRAM(s) Oral with dinner    MEDICATIONS  (PRN):  acetaminophen   IVPB .. 1000 milliGRAM(s) IV Intermittent once PRN Severe Pain (7 - 10)  melatonin 3 milliGRAM(s) Oral at bedtime PRN Insomnia  ondansetron Injectable 4 milliGRAM(s) IV Push every 8 hours PRN Nausea and/or Vomiting  oxyCODONE    IR 5 milliGRAM(s) Oral every 6 hours PRN Severe Pain (7 - 10)  oxyCODONE    IR 2.5 milliGRAM(s) Oral every 4 hours PRN Moderate Pain (4 - 6)  traMADol 50 milliGRAM(s) Oral every 6 hours PRN Mild Pain (1 - 3)      CAPILLARY BLOOD GLUCOSE        I&O's Summary    2023 07:01  -  2023 07:00  --------------------------------------------------------  IN: 360 mL / OUT: 275 mL / NET: 85 mL        PHYSICAL EXAM:  Vital Signs Last 24 Hrs  T(C): 36.6 (2023 12:40), Max: 36.9 (2023 20:15)  T(F): 97.8 (2023 12:40), Max: 98.4 (2023 20:15)  HR: 77 (2023 12:40) (75 - 88)  BP: 116/63 (2023 12:40) (110/69 - 155/70)  BP(mean): 95 (2023 17:45) (91 - 101)  RR: 18 (2023 12:40) (16 - 18)  SpO2: 93% (2023 12:40) (93% - 100%)    Parameters below as of 2023 12:40  Patient On (Oxygen Delivery Method): room air        PHYSICAL EXAM:  GENERAL: NAD, well-developed  HEAD:  Atraumatic, Normocephalic  EYES:  conjunctiva and sclera clear  NECK: Supple, No JVD  CHEST/LUNG: Clear to auscultation bilaterally; No wheeze  HEART: Regular rate and rhythm; No murmurs, rubs, or gallops  ABDOMEN: Soft, Nontender, Nondistended; Bowel sounds present  EXTREMITIES:  2+ Peripheral Pulses, No clubbing, cyanosis, or edema  PSYCH: AAOx3  NEUROLOGY: non-focal      LABS:                        11.1   9.46  )-----------( 234      ( 2023 06:40 )             34.4     01-14    139  |  101  |  20  ----------------------------<  115<H>  4.5   |  23  |  0.54    Ca    8.2<L>      2023 06:40      PT/INR - ( 2023 07:28 )   PT: 15.0 sec;   INR: 1.29 ratio         PTT - ( 2023 00:50 )  PTT:26.7 sec      Urinalysis Basic - ( 2023 05:30 )    Color: Yellow / Appearance: Slightly Turbid / S.027 / pH: x  Gluc: x / Ketone: Moderate  / Bili: Negative / Urobili: Negative   Blood: x / Protein: 100 mg/dl / Nitrite: Positive   Leuk Esterase: Large / RBC: 4 /hpf /  /HPF   Sq Epi: x / Non Sq Epi: 1 /hpf / Bacteria: Many          RADIOLOGY & ADDITIONAL TESTS:    Imaging Personally Reviewed:    Electrocardiogram Personally Reviewed:    COORDINATION OF CARE:  Care Discussed with Consultants/Other Providers [Y/N]:  Prior or Outpatient Records Reviewed [Y/N]:

## 2023-01-14 NOTE — DIETITIAN INITIAL EVALUATION ADULT - EDUCATION DIETARY MODIFICATIONS
RD encouraged pt to start with protein on plate to optimize intake. Pt amenable to receiving Ensure. No questions at this time. Pt made aware RD to remain available./(2) meets goals/outcomes/verbalization

## 2023-01-14 NOTE — DIETITIAN INITIAL EVALUATION ADULT - ADD RECOMMEND
As medically feasible, consider addition of multivitamin and Vitamin C for micronutrient coverage and to aid in wound healing. Reinforce nutrition education as able. Continue to trend labs, weight, skin integrity, and intake.

## 2023-01-14 NOTE — PROGRESS NOTE ADULT - NSPROGADDITIONALINFOA_GEN_ALL_CORE
MULTIPLE ABD NODULES ---> WILL NEED OUT OR IN PATIENT EVAL   ABnormal U/A ---> received one dose of CEFTRIAXONE  and 2 doses of Cefazolin  f/up UCX  ( UCX done in 2/3/21 had GAYLE Negron MULTIPLE ABD NODULES ---> WILL NEED OUT OR IN PATIENT EVAL   ABnormal U/A ---> received one dose of CEFTRIAXONE  and 2 doses of Cefazolin  f/up UCX  ( UCX done in 2/3/21 had GAYLE Negron  Hospitalist    / TEAMS

## 2023-01-14 NOTE — PROGRESS NOTE ADULT - SUBJECTIVE AND OBJECTIVE BOX
DATE OF SERVICE: 01-14-23 @ 13:07    Patient is a 88y old  Female who presents with a chief complaint of Fall (14 Jan 2023 07:55)      INTERVAL HISTORY: Feels ok.     REVIEW OF SYSTEMS:  CONSTITUTIONAL: No weakness  EYES/ENT: No visual changes;  No throat pain   NECK: No pain or stiffness  RESPIRATORY: No cough, wheezing; No shortness of breath  CARDIOVASCULAR: No chest pain or palpitations  GASTROINTESTINAL: No abdominal  pain. No nausea, vomiting, or hematemesis  GENITOURINARY: No dysuria, frequency or hematuria  NEUROLOGICAL: No stroke like symptoms  SKIN: No rashes    	  MEDICATIONS:  amLODIPine   Tablet 2.5 milliGRAM(s) Oral daily  metoprolol succinate ER 25 milliGRAM(s) Oral daily        PHYSICAL EXAM:  T(C): 36.6 (01-14-23 @ 12:40), Max: 36.9 (01-13-23 @ 20:15)  HR: 77 (01-14-23 @ 12:40) (75 - 88)  BP: 116/63 (01-14-23 @ 12:40) (110/69 - 155/70)  RR: 18 (01-14-23 @ 12:40) (16 - 18)  SpO2: 93% (01-14-23 @ 12:40) (93% - 100%)  Wt(kg): --  I&O's Summary    13 Jan 2023 07:01  -  14 Jan 2023 07:00  --------------------------------------------------------  IN: 360 mL / OUT: 275 mL / NET: 85 mL          Appearance: In no distress	  HEENT:    PERRL, EOMI	  Cardiovascular:  S1 S2, No JVD  Respiratory: Lungs clear to auscultation	  Gastrointestinal:  Soft, Non-tender, + BS	  Vascularature:  No edema of LE  Psychiatric: Appropriate affect   Neuro: no acute focal deficits                               11.1   9.46  )-----------( 234      ( 14 Jan 2023 06:40 )             34.4     01-14    139  |  101  |  20  ----------------------------<  115<H>  4.5   |  23  |  0.54    Ca    8.2<L>      14 Jan 2023 06:40          Labs personally reviewed      ASSESSMENT/PLAN: 	      88-year-old female past medical history of PE on Xarelto, anxiety, depression, hyperlipidemia, MVP who presented after a fall at home found to have impacted left femoral neck fracture    Problem/Plan - 1:  ·  Problem: New Systolic HF  ·  Plan: Troponin level elevated on admission, repeats essentially flat. Likely elevated in setting of systolic HF with EF 35% and downtrending from MI in late November   - [facility] hospitalization records obtained from November 24, 2022  - Admitted there with chest pain, found to have as noted on CTA report "small volume PE in the left upper lobe segmental branches and ? RLL subsegmental branches"  - Trop noted to be 1597 but was never trended to peak,   EKG there with LBBB  - I suspect her presentation there was consistent with ?MI (small PE shouldn't mount a trop og 1597 or likely higher) with now resultant drop in EF now  - She has been chest pain free since her hospitalization and not SOB  - Will need to initiate GDMT for sHF post op.  - c/w Metoprolol 25mg PO daily. Will initiate Entresto 24/26mg PO BID starting tomorrow.     Problem/Plan - 2:  ·  Problem: Preop Risk Stratification  ·  Plan: Given recent nonvascularized MI with sHF she at high risk for mod risk non-elective urgent ORIF  - discussed with daughter that pt is high risk for torrey-operative CV events including MI and death   - Tolerated surgery well.     Problem/Plan - 3:  ·  Problem: Pulmonary embolism.   ·  Plan:    - Xarelto resumed    Problem/Plan - 4:  ·  Problem: Essential hypertension.   ·  Plan: BP overall acceptable   - Amlodipine DC.  - c/w Metoprolol  - Will add as Entresto tomorrow as part of sHF GDMT        LINA Forbes-MICHELLE Anguiano,  [facility]  Cardiovascular Medicine  [address], [address]  Available through call or text on [employer]AM[employer]  Office: [phone]

## 2023-01-15 NOTE — PROGRESS NOTE ADULT - NSPROGADDITIONALINFOA_GEN_ALL_CORE
MULTIPLE ABD NODULES ---> WILL NEED OUT OR IN PATIENT EVAL   ABnormal U/A ---> received one dose of CEFTRIAXONE  and 2 doses of Cefazolin  f/up UCX  ( UCX done in 2/3/21 had E coli   > 40 mns spent on her care       [place]  Hospitalist   [phone] / TEAMS

## 2023-01-15 NOTE — PROGRESS NOTE ADULT - SUBJECTIVE AND OBJECTIVE BOX
DATE OF SERVICE: 01-15-23 @ 11:26    Patient is a 88y old  Female who presents with a chief complaint of Fall (15 Tavo 2023 09:18)      INTERVAL HISTORY:  Feels ok.     REVIEW OF SYSTEMS:  CONSTITUTIONAL: No weakness  EYES/ENT: No visual changes;  No throat pain   NECK: No pain or stiffness  RESPIRATORY: No cough, wheezing; No shortness of breath  CARDIOVASCULAR: No chest pain or palpitations  GASTROINTESTINAL: No abdominal  pain. No nausea, vomiting, or hematemesis  GENITOURINARY: No dysuria, frequency or hematuria  NEUROLOGICAL: No stroke like symptoms  SKIN: No rashes    	  MEDICATIONS:  metoprolol succinate ER 25 milliGRAM(s) Oral daily        PHYSICAL EXAM:  T(C): 36.5 (01-15-23 @ 08:07), Max: 36.8 (01-15-23 @ 00:01)  HR: 77 (01-15-23 @ 08:07) (77 - 91)  BP: 116/71 (01-15-23 @ 08:07) (100/62 - 130/78)  RR: 18 (01-15-23 @ 08:07) (18 - 18)  SpO2: 96% (01-15-23 @ 08:07) (93% - 96%)  Wt(kg): --  I&O's Summary    14 Jan 2023 07:01  -  15 Tavo 2023 07:00  --------------------------------------------------------  IN: 460 mL / OUT: 550 mL / NET: -90 mL          Appearance: In no distress	  HEENT:    PERRL, EOMI	  Cardiovascular:  S1 S2, No JVD  Respiratory: Lungs clear to auscultation	  Gastrointestinal:  Soft, Non-tender, + BS	  Vascularature:  No edema of LE  Psychiatric: Appropriate affect   Neuro: no acute focal deficits                               11.1   9.46  )-----------( 234      ( 14 Jan 2023 06:40 )             34.4     01-14    139  |  101  |  20  ----------------------------<  115<H>  4.5   |  23  |  0.54    Ca    8.2<L>      14 Jan 2023 06:40          Labs personally reviewed      ASSESSMENT/PLAN: 	      88-year-old female past medical history of PE on Xarelto, anxiety, depression, hyperlipidemia, MVP who presented after a fall at home found to have impacted left femoral neck fracture    Problem/Plan - 1:  ·  Problem: New Systolic HF  ·  Plan: Troponin level elevated on admission, repeats essentially flat. Likely elevated in setting of systolic HF with EF 35% and downtrending from MI in late November   - [facility] hospitalization records obtained from November 24, 2022  - Admitted there with chest pain, found to have as noted on CTA report "small volume PE in the left upper lobe segmental branches and ? RLL subsegmental branches"  - Trop noted to be 1597 but was never trended to peak,   EKG there with LBBB  - I suspect her presentation there was consistent with ?MI (small PE shouldn't mount a trop og 1597 or likely higher) with now resultant drop in EF now  - She has been chest pain free since her hospitalization and not SOB  - Will need to initiate GDMT for sHF post op.  - c/w Metoprolol 25mg PO daily. Will initiate Entresto 24/26mg PO BID when BP allows. Will cont to trend.     Problem/Plan - 2:  ·  Problem: Preop Risk Stratification  ·  Plan: Given recent nonvascularized MI with sHF she at high risk for mod risk non-elective urgent ORIF  - discussed with daughter that pt is high risk for torrey-operative CV events including MI and death   - Tolerated surgery well.     Problem/Plan - 3:  ·  Problem: Pulmonary embolism.   ·  Plan:    - Xarelto resumed    Problem/Plan - 4:  ·  Problem: Essential hypertension.   ·  Plan: BP overall acceptable   - Amlodipine DC.  - c/w Metoprolol  - Will add as Entresto as part of sHF GDMT when BP allows.         Raysa Vásquez, LINA-MICHELLE Anguiano DO [facility]  Cardiovascular Medicine  [address] [place] [address], [address]  Available through call or text on [employer]  Office: [phone]

## 2023-01-15 NOTE — PROGRESS NOTE ADULT - SUBJECTIVE AND OBJECTIVE BOX
Post op Day [2]    Patient resting without complaints.  No chest pain, SOB, N/V.    T(C): 36.5 (01-15-23 @ 08:07), Max: 36.8 (01-15-23 @ 00:01)  HR: 77 (01-15-23 @ 08:07) (77 - 91)  BP: 116/71 (01-15-23 @ 08:07) (100/62 - 130/78)  RR: 18 (01-15-23 @ 08:07) (18 - 18)  SpO2: 96% (01-15-23 @ 08:07) (93% - 96%)  Wt(kg): --    Exam:  Alert and Oriented, No Acute Distress    Lower Extremities: L Hip  Hip Abduction Pillow  Dressing: C/D/I w/ Aquacel  Calves Soft, Non-tender bilaterally  +PF/DF/EHL/FHL  SILT  +DP Pulse                              11.1   9.46  )-----------( 234      ( 14 Jan 2023 06:40 )             34.4    01-14    139  |  101  |  20  ----------------------------<  115<H>  4.5   |  23  |  0.54    Ca    8.2<L>      14 Jan 2023 06:40

## 2023-01-15 NOTE — PROGRESS NOTE ADULT - SUBJECTIVE AND OBJECTIVE BOX
Patient is a 88y old  Female who presents with a chief complaint of Fall (15 Tavo 2023 11:26)      SUBJECTIVE / OVERNIGHT EVENTS:    Tele reviewed:       ADDITIONAL REVIEW OF SYSTEMS: Negative except for above    MEDICATIONS  (STANDING):  acetaminophen     Tablet .. 975 milliGRAM(s) Oral every 8 hours  cefTRIAXone   IVPB 1000 milliGRAM(s) IV Intermittent every 24 hours  FLUoxetine Solution 5 milliGRAM(s) Oral daily  metoprolol succinate ER 25 milliGRAM(s) Oral daily  polyethylene glycol 3350 17 Gram(s) Oral daily  rivaroxaban 20 milliGRAM(s) Oral with dinner    MEDICATIONS  (PRN):  acetaminophen   IVPB .. 1000 milliGRAM(s) IV Intermittent once PRN Severe Pain (7 - 10)  ondansetron Injectable 4 milliGRAM(s) IV Push every 8 hours PRN Nausea and/or Vomiting  oxyCODONE    IR 5 milliGRAM(s) Oral every 6 hours PRN Severe Pain (7 - 10)  oxyCODONE    IR 2.5 milliGRAM(s) Oral every 4 hours PRN Moderate Pain (4 - 6)  traMADol 50 milliGRAM(s) Oral every 6 hours PRN Mild Pain (1 - 3)      CAPILLARY BLOOD GLUCOSE        I&O's Summary    14 Jan 2023 07:01  -  15 Tavo 2023 07:00  --------------------------------------------------------  IN: 460 mL / OUT: 550 mL / NET: -90 mL        PHYSICAL EXAM:  Vital Signs Last 24 Hrs  T(C): 36.5 (15 Tavo 2023 08:07), Max: 36.8 (15 Tavo 2023 00:01)  T(F): 97.7 (15 Tavo 2023 08:07), Max: 98.3 (15 Tavo 2023 00:01)  HR: 77 (15 Tavo 2023 08:07) (77 - 91)  BP: 116/71 (15 Tavo 2023 08:07) (100/62 - 130/78)  BP(mean): --  RR: 18 (15 Tavo 2023 08:07) (18 - 18)  SpO2: 96% (15 Tavo 2023 08:07) (93% - 96%)    Parameters below as of 15 Tavo 2023 08:07  Patient On (Oxygen Delivery Method): room air        PHYSICAL EXAM:        LABS:                        11.1   9.46  )-----------( 234      ( 14 Jan 2023 06:40 )             34.4     01-14    139  |  101  |  20  ----------------------------<  115<H>  4.5   |  23  |  0.54    Ca    8.2<L>      14 Jan 2023 06:40                Culture - Urine (collected 13 Jan 2023 12:29)  Source: Catheterized Catheterized  Preliminary Report (15 Tavo 2023 09:44):    >100,000 CFU/ml Gram Negative Rods        RADIOLOGY & ADDITIONAL TESTS:    Imaging Personally Reviewed:    Electrocardiogram Personally Reviewed:    COORDINATION OF CARE:  Care Discussed with Consultants/Other Providers [Y/N]:  Prior or Outpatient Records Reviewed [Y/N]:     Patient is a 88y old  Female who presents with a chief complaint of Fall (15 Tavo 2023 11:26)      SUBJECTIVE / OVERNIGHT EVENTS:    Patient was seen and she denies any pain.  Patient states that she was sleepy in AM and does not want the Melatonin.        ADDITIONAL REVIEW OF SYSTEMS: Negative except for above    MEDICATIONS  (STANDING):  acetaminophen     Tablet .. 975 milliGRAM(s) Oral every 8 hours  cefTRIAXone   IVPB 1000 milliGRAM(s) IV Intermittent every 24 hours  FLUoxetine Solution 5 milliGRAM(s) Oral daily  metoprolol succinate ER 25 milliGRAM(s) Oral daily  polyethylene glycol 3350 17 Gram(s) Oral daily  rivaroxaban 20 milliGRAM(s) Oral with dinner    MEDICATIONS  (PRN):  acetaminophen   IVPB .. 1000 milliGRAM(s) IV Intermittent once PRN Severe Pain (7 - 10)  ondansetron Injectable 4 milliGRAM(s) IV Push every 8 hours PRN Nausea and/or Vomiting  oxyCODONE    IR 5 milliGRAM(s) Oral every 6 hours PRN Severe Pain (7 - 10)  oxyCODONE    IR 2.5 milliGRAM(s) Oral every 4 hours PRN Moderate Pain (4 - 6)  traMADol 50 milliGRAM(s) Oral every 6 hours PRN Mild Pain (1 - 3)      CAPILLARY BLOOD GLUCOSE        I&O's Summary    14 Jan 2023 07:01  -  15 Tavo 2023 07:00  --------------------------------------------------------  IN: 460 mL / OUT: 550 mL / NET: -90 mL        PHYSICAL EXAM:  Vital Signs Last 24 Hrs  T(C): 36.5 (15 Tavo 2023 08:07), Max: 36.8 (15 Tavo 2023 00:01)  T(F): 97.7 (15 Tavo 2023 08:07), Max: 98.3 (15 Tavo 2023 00:01)  HR: 77 (15 Tavo 2023 08:07) (77 - 91)  BP: 116/71 (15 Tavo 2023 08:07) (100/62 - 130/78)  BP(mean): --  RR: 18 (15 Tavo 2023 08:07) (18 - 18)  SpO2: 96% (15 Tavo 2023 08:07) (93% - 96%)    Parameters below as of 15 Tavo 2023 08:07  Patient On (Oxygen Delivery Method): room air        PHYSICAL EXAM:  GENERAL: NAD, well-developed  HEAD:  Atraumatic, Normocephalic  EYES:  conjunctiva and sclera clear  NECK: Supple, No JVD  CHEST/LUNG: Clear to auscultation bilaterally; No wheeze  HEART: Regular rate and rhythm; No murmurs, rubs, or gallops  ABDOMEN: Soft, Nontender, Nondistended; Bowel sounds present  EXTREMITIES:  2+ Peripheral Pulses, No clubbing, cyanosis, or edema  PSYCH: AAOx3  NEUROLOGY: non-focal      LABS:                        11.1   9.46  )-----------( 234      ( 14 Jan 2023 06:40 )             34.4     01-14    139  |  101  |  20  ----------------------------<  115<H>  4.5   |  23  |  0.54    Ca    8.2<L>      14 Jan 2023 06:40                Culture - Urine (collected 13 Jan 2023 12:29)  Source: Catheterized Catheterized  Preliminary Report (15 Tavo 2023 09:44):    >100,000 CFU/ml Gram Negative Rods        RADIOLOGY & ADDITIONAL TESTS:    Imaging Personally Reviewed:    Electrocardiogram Personally Reviewed:    COORDINATION OF CARE:  Care Discussed with Consultants/Other Providers [Y/N]:  Prior or Outpatient Records Reviewed [Y/N]:

## 2023-01-16 NOTE — PROGRESS NOTE ADULT - NSPROGADDITIONALINFOA_GEN_ALL_CORE
MULTIPLE ABD NODULES ---> WILL NEED OUT OR IN PATIENT EVAL   > 40 mns spent on her care   case is discussed with ACP and cardio ACP       [place]  Hospitalist   [phone] / TEAMS

## 2023-01-16 NOTE — PROGRESS NOTE ADULT - SUBJECTIVE AND OBJECTIVE BOX
Patient is a 88y old  Female who presents with a chief complaint of Fall (16 Jan 2023 06:56)      SUBJECTIVE / OVERNIGHT EVENTS:    Tele reviewed:       ADDITIONAL REVIEW OF SYSTEMS: Negative except for above    MEDICATIONS  (STANDING):  acetaminophen     Tablet .. 975 milliGRAM(s) Oral every 8 hours  escitalopram 5 milliGRAM(s) Oral daily  losartan 25 milliGRAM(s) Oral daily  metoprolol succinate ER 25 milliGRAM(s) Oral daily  polyethylene glycol 3350 17 Gram(s) Oral daily  rivaroxaban 20 milliGRAM(s) Oral with dinner    MEDICATIONS  (PRN):  acetaminophen   IVPB .. 1000 milliGRAM(s) IV Intermittent once PRN Severe Pain (7 - 10)  bisacodyl 5 milliGRAM(s) Oral every 12 hours PRN Constipation  ondansetron Injectable 4 milliGRAM(s) IV Push every 8 hours PRN Nausea and/or Vomiting  oxyCODONE    IR 5 milliGRAM(s) Oral every 6 hours PRN Severe Pain (7 - 10)  oxyCODONE    IR 2.5 milliGRAM(s) Oral every 4 hours PRN Moderate Pain (4 - 6)  traMADol 50 milliGRAM(s) Oral every 6 hours PRN Mild Pain (1 - 3)      CAPILLARY BLOOD GLUCOSE        I&O's Summary    15 Tavo 2023 07:01  -  16 Jan 2023 07:00  --------------------------------------------------------  IN: 270 mL / OUT: 600 mL / NET: -330 mL        PHYSICAL EXAM:  Vital Signs Last 24 Hrs  T(C): 36.8 (16 Jan 2023 08:54), Max: 37.2 (15 Tavo 2023 12:35)  T(F): 98.2 (16 Jan 2023 08:54), Max: 99 (15 Tavo 2023 12:35)  HR: 78 (16 Jan 2023 08:54) (73 - 94)  BP: 130/75 (16 Jan 2023 08:54) (109/67 - 135/84)  BP(mean): --  RR: 18 (16 Jan 2023 08:54) (18 - 18)  SpO2: 92% (16 Jan 2023 08:54) (92% - 96%)    Parameters below as of 16 Jan 2023 08:54  Patient On (Oxygen Delivery Method): room air        PHYSICAL EXAM:      LABS:                        9.7    7.25  )-----------( 209      ( 16 Jan 2023 06:34 )             30.1     01-16    138  |  102  |  16  ----------------------------<  113<H>  3.9   |  25  |  0.60    Ca    8.6      16 Jan 2023 06:34    TPro  5.9<L>  /  Alb  3.3  /  TBili  0.5  /  DBili  x   /  AST  16  /  ALT  5<L>  /  AlkPhos  50  01-16              Culture - Urine (collected 14 Jan 2023 14:00)  Source: Clean Catch Clean Catch (Midstream)  Final Report (15 Tavo 2023 14:19):    <10,000 CFU/mL Normal Urogenital Mehnaz    Culture - Urine (collected 13 Jan 2023 12:29)  Source: Catheterized Catheterized  Final Report (16 Jan 2023 09:59):    >100,000 CFU/ml Klebsiella pneumoniae  Organism: Klebsiella pneumoniae (16 Jan 2023 09:59)  Organism: Klebsiella pneumoniae (16 Jan 2023 09:59)        RADIOLOGY & ADDITIONAL TESTS:    Imaging Personally Reviewed:    Electrocardiogram Personally Reviewed:    COORDINATION OF CARE:  Care Discussed with Consultants/Other Providers [Y/N]:  Prior or Outpatient Records Reviewed [Y/N]:     Patient is a 88y old  Female who presents with a chief complaint of Fall (16 Jan 2023 06:56)      SUBJECTIVE / OVERNIGHT EVENTS:   Patient was seen with daughter at the bedside and offers no complaints       ADDITIONAL REVIEW OF SYSTEMS: Negative except for above    MEDICATIONS  (STANDING):  acetaminophen     Tablet .. 975 milliGRAM(s) Oral every 8 hours  escitalopram 5 milliGRAM(s) Oral daily  losartan 25 milliGRAM(s) Oral daily  metoprolol succinate ER 25 milliGRAM(s) Oral daily  polyethylene glycol 3350 17 Gram(s) Oral daily  rivaroxaban 20 milliGRAM(s) Oral with dinner    MEDICATIONS  (PRN):  acetaminophen   IVPB .. 1000 milliGRAM(s) IV Intermittent once PRN Severe Pain (7 - 10)  bisacodyl 5 milliGRAM(s) Oral every 12 hours PRN Constipation  ondansetron Injectable 4 milliGRAM(s) IV Push every 8 hours PRN Nausea and/or Vomiting  oxyCODONE    IR 5 milliGRAM(s) Oral every 6 hours PRN Severe Pain (7 - 10)  oxyCODONE    IR 2.5 milliGRAM(s) Oral every 4 hours PRN Moderate Pain (4 - 6)  traMADol 50 milliGRAM(s) Oral every 6 hours PRN Mild Pain (1 - 3)      CAPILLARY BLOOD GLUCOSE        I&O's Summary    15 Tavo 2023 07:01  -  16 Jan 2023 07:00  --------------------------------------------------------  IN: 270 mL / OUT: 600 mL / NET: -330 mL        PHYSICAL EXAM:  Vital Signs Last 24 Hrs  T(C): 36.8 (16 Jan 2023 08:54), Max: 37.2 (15 Tavo 2023 12:35)  T(F): 98.2 (16 Jan 2023 08:54), Max: 99 (15 Tavo 2023 12:35)  HR: 78 (16 Jan 2023 08:54) (73 - 94)  BP: 130/75 (16 Jan 2023 08:54) (109/67 - 135/84)  BP(mean): --  RR: 18 (16 Jan 2023 08:54) (18 - 18)  SpO2: 92% (16 Jan 2023 08:54) (92% - 96%)    Parameters below as of 16 Jan 2023 08:54  Patient On (Oxygen Delivery Method): room air        PHYSICAL EXAM:  GENERAL: NAD, well-developed  HEAD:  Atraumatic, Normocephalic  EYES:  conjunctiva and sclera clear  NECK: Supple, No JVD  CHEST/LUNG: Clear to auscultation bilaterally; No wheeze  HEART: Regular rate and rhythm; No murmurs, rubs, or gallops  ABDOMEN: Soft, Nontender, Nondistended; Bowel sounds present  EXTREMITIES:  2+ Peripheral Pulses, No clubbing, cyanosis, or edema  PSYCH: AAOx3  Skin  : Left hip with dressing in place , not removed     LABS:                        9.7    7.25  )-----------( 209      ( 16 Jan 2023 06:34 )             30.1     01-16    138  |  102  |  16  ----------------------------<  113<H>  3.9   |  25  |  0.60    Ca    8.6      16 Jan 2023 06:34    TPro  5.9<L>  /  Alb  3.3  /  TBili  0.5  /  DBili  x   /  AST  16  /  ALT  5<L>  /  AlkPhos  50  01-16              Culture - Urine (collected 14 Jan 2023 14:00)  Source: Clean Catch Clean Catch (Midstream)  Final Report (15 Tavo 2023 14:19):    <10,000 CFU/mL Normal Urogenital Mehnaz    Culture - Urine (collected 13 Jan 2023 12:29)  Source: Catheterized Catheterized  Final Report (16 Jan 2023 09:59):    >100,000 CFU/ml Klebsiella pneumoniae  Organism: Klebsiella pneumoniae (16 Jan 2023 09:59)  Organism: Klebsiella pneumoniae (16 Jan 2023 09:59)        RADIOLOGY & ADDITIONAL TESTS:    Imaging Personally Reviewed:    Electrocardiogram Personally Reviewed:    COORDINATION OF CARE:  Care Discussed with Consultants/Other Providers [Y/N]:  Prior or Outpatient Records Reviewed [Y/N]:

## 2023-01-16 NOTE — PROGRESS NOTE ADULT - SUBJECTIVE AND OBJECTIVE BOX
Post op day 3    Patient resting without complaints.  No chest pain, SOB, N/V.    Vital Signs Last 24 Hrs  T(C): 36.6 (16 Jan 2023 05:23), Max: 37.2 (15 Tavo 2023 12:35)  T(F): 97.9 (16 Jan 2023 05:23), Max: 99 (15 Tavo 2023 12:35)  HR: 86 (16 Jan 2023 05:23) (73 - 94)  BP: 135/74 (16 Jan 2023 05:23) (109/67 - 135/84)  BP(mean): --  RR: 18 (16 Jan 2023 05:23) (18 - 18)  SpO2: 94% (16 Jan 2023 05:23) (94% - 96%)    Parameters below as of 16 Jan 2023 05:23  Patient On (Oxygen Delivery Method): room air        Exam:  Alert and Oriented, No Acute Distress    Lower Extremities: L Hip  Hip Abduction Pillow (Not in place at this time)  Dressing: C/D/I w/ Aquacel  Calves Soft, Non-tender bilaterally  +PF/DF/EHL/FHL  SILT  +DP Pulse

## 2023-01-16 NOTE — PROGRESS NOTE ADULT - SUBJECTIVE AND OBJECTIVE BOX
DATE OF SERVICE: 01-16-23 @ 12:23    Patient is a 88y old  Female who presents with a chief complaint of Fall (16 Jan 2023 11:50)      INTERVAL HISTORY: Feels ok.     REVIEW OF SYSTEMS:  CONSTITUTIONAL: No weakness  EYES/ENT: No visual changes;  No throat pain   NECK: No pain or stiffness  RESPIRATORY: No cough, wheezing; No shortness of breath  CARDIOVASCULAR: No chest pain or palpitations  GASTROINTESTINAL: No abdominal  pain. No nausea, vomiting, or hematemesis  GENITOURINARY: No dysuria, frequency or hematuria  NEUROLOGICAL: No stroke like symptoms  SKIN: No rashes    	  MEDICATIONS:  losartan 25 milliGRAM(s) Oral daily  metoprolol succinate ER 25 milliGRAM(s) Oral daily        PHYSICAL EXAM:  T(C): 36.8 (01-16-23 @ 08:54), Max: 37.2 (01-15-23 @ 12:35)  HR: 78 (01-16-23 @ 08:54) (73 - 94)  BP: 130/75 (01-16-23 @ 08:54) (109/67 - 135/84)  RR: 18 (01-16-23 @ 08:54) (18 - 18)  SpO2: 92% (01-16-23 @ 08:54) (92% - 96%)  Wt(kg): --  I&O's Summary    15 Tavo 2023 07:01  -  16 Jan 2023 07:00  --------------------------------------------------------  IN: 270 mL / OUT: 600 mL / NET: -330 mL          Appearance: In no distress	  HEENT:    PERRL, EOMI	  Cardiovascular:  S1 S2, No JVD  Respiratory: Lungs clear to auscultation	  Gastrointestinal:  Soft, Non-tender, + BS	  Vascularature:  No edema of LE  Psychiatric: Appropriate affect   Neuro: no acute focal deficits                               9.7    7.25  )-----------( 209      ( 16 Jan 2023 06:34 )             30.1     01-16    138  |  102  |  16  ----------------------------<  113<H>  3.9   |  25  |  0.60    Ca    8.6      16 Jan 2023 06:34    TPro  5.9<L>  /  Alb  3.3  /  TBili  0.5  /  DBili  x   /  AST  16  /  ALT  5<L>  /  AlkPhos  50  01-16        Labs personally reviewed      ASSESSMENT/PLAN: 	    88-year-old female past medical history of PE on Xarelto, anxiety, depression, hyperlipidemia, MVP who presented after a fall at home found to have impacted left femoral neck fracture    Problem/Plan - 1:  ·  Problem: New Systolic HF  ·  Plan: Troponin level elevated on admission, repeats essentially flat. Likely elevated in setting of systolic HF with EF 35% and downtrending from MI in late November   - [facility] hospitalization records obtained from November 24, 2022  - Admitted there with chest pain, found to have as noted on CTA report "small volume PE in the left upper lobe segmental branches and ? RLL subsegmental branches"  - Trop noted to be 1597 but was never trended to peak,   EKG there with LBBB  - I suspect her presentation there was consistent with ?MI (small PE shouldn't mount a trop og 1597 or likely higher) with now resultant drop in EF now  - She has been chest pain free since her hospitalization and not SOB  - Will need to initiate GDMT for sHF post op.  - c/w Metoprolol 25mg PO daily.   - Will start Losartan 25mg PO daily. If will tolerated, will consider switching to Entresto.     Problem/Plan - 2:  ·  Problem: Preop Risk Stratification  ·  Plan: Given recent nonvascularized MI with sHF she at high risk for mod risk non-elective urgent ORIF  - discussed with daughter that pt is high risk for torrey-operative CV events including MI and death   - Tolerated surgery well.     Problem/Plan - 3:  ·  Problem: Pulmonary embolism.   ·  Plan:    - Xarelto resumed    Problem/Plan - 4:  ·  Problem: Essential hypertension.   ·  Plan: BP overall acceptable   - Amlodipine DC.  - c/w Metoprolol  - Started Losartan 25mg PO daily with hold parameters        LINA Forbes-NP   Clyde Anguiano DO [facility]  Cardiovascular Medicine  [address], [address]  Available through call or text on [employer]  Office: [phone]

## 2023-01-17 NOTE — BH CONSULTATION LIAISON ASSESSMENT NOTE - CURRENT MEDICATION
MEDICATIONS  (STANDING):  acetaminophen     Tablet .. 975 milliGRAM(s) Oral every 8 hours  aspirin  chewable 81 milliGRAM(s) Oral daily  atorvastatin 40 milliGRAM(s) Oral at bedtime  escitalopram 5 milliGRAM(s) Oral daily  metoprolol succinate ER 25 milliGRAM(s) Oral daily  polyethylene glycol 3350 17 Gram(s) Oral daily  rivaroxaban 20 milliGRAM(s) Oral with dinner    MEDICATIONS  (PRN):  acetaminophen   IVPB .. 1000 milliGRAM(s) IV Intermittent once PRN Severe Pain (7 - 10)  bisacodyl 5 milliGRAM(s) Oral every 12 hours PRN Constipation  ondansetron Injectable 4 milliGRAM(s) IV Push every 8 hours PRN Nausea and/or Vomiting  oxyCODONE    IR 5 milliGRAM(s) Oral every 6 hours PRN Severe Pain (7 - 10)  oxyCODONE    IR 2.5 milliGRAM(s) Oral every 4 hours PRN Moderate Pain (4 - 6)  traMADol 50 milliGRAM(s) Oral every 6 hours PRN Mild Pain (1 - 3)

## 2023-01-17 NOTE — PROGRESS NOTE ADULT - PROBLEM SELECTOR PLAN 1
Sustained in fall.   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
Seen on imaging. Sustained in fall.  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
Sustained in fall.   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
Seen on imaging. Sustained in fall.  - Seen by ortho. Plan for OR tomorrow 1/13  -CW standing tylenol, oxy 2.5mg for moderate and 5mg for severe pain  - Bowel regimen while on opioids
Seen on imaging. Sustained in fall.  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
Seen on imaging. Sustained in fall.  - Seen by ortho. Plan for OR tomorrow 1/13  - Start standing tylenol, oxy 2.5mg for moderate and 5mg for severe pain  - Bowel regimen while on opioids

## 2023-01-17 NOTE — PROGRESS NOTE ADULT - PROBLEM SELECTOR PLAN 8
MULTIPLE ABD NODULES ---> WILL NEED OUT OR IN PATIENT EVAL and follow up
MULTIPLE ABD NODULES ---> WILL NEED OUT OR IN PATIENT EVAL and follow up
MULTIPLE ABD NODULES ---> WILL NEED OUT OR IN PATIENT EVAL and follow up.

## 2023-01-17 NOTE — BH CONSULTATION LIAISON ASSESSMENT NOTE - SUMMARY
Patient is a retired 88 year-old female residing with her daughter in [facility], PPHx of depression and anxiety on lexapro 5mg and ativan 0.5mg PRN prescribed by PMD, no prior psychiatric admission, no hx of SAs, no substance use, no legal history. PMHx of PE on Xarelto, anxiety, depression, hyperlipidemia, MVP who presented to ED after a fall at home found to have impacted left femoral neck fracture s/p left hip hemiarthroplasty on 1/13/23. Psychiatry consulted after patient endorsed SI last night ("I want to end it all"), now denying.    Patient states she is feeling "tired" and "cranky" and wants to go home. She states her depression is okay and tends to fluctuate from day to day; worse last night in context of embarrassing event last night after receiving enema and being unable to reach bathroom. Denies endorsing SI last night. At this time, patient endorses mild symptoms of depression including disrupted sleep, decreased energy levels, and anhedonia. Reports good appetite. Denies passive or active SI, no intent/plan to harm self or others. At home, patient takes ativan 0.5mg PRN anxiety ~2x/week. iSTOP referenced 1/17/23 (Reference #: [id number]) and confirmed ativan Rx. Denies hx of suicide attempts, prior psychiatric admissions, substance use including alcohol, tobacco, and marijuana. No access to firearms. No hx of psychosis. Denies AVH.    At this time patient is not deemed to be an acute danger to herself. No active or passive SI. Patient is a retired 88 year-old female residing with her daughter in [facility], PPHx of depression and anxiety on  Lexapro 5mg and ativan 0.5mg PRN prescribed by PMD, no prior psychiatric admission, no hx of SAs, no substance use, no legal history. PMHx of PE on Xarelto, anxiety, depression, hyperlipidemia, MVP who presented to ED after a fall at home found to have impacted left femoral neck fracture s/p left hip hemiarthroplasty on 1/13/23. Psychiatry consulted after patient endorsed SI last night ("I want to end it all"), now denying.    Patient states she is feeling "tired" and "cranky" and wants to go home. She states her depression is okay and tends to fluctuate from day to day; worse last night in context of embarrassing event last night after receiving enema and being unable to reach bathroom. Denies endorsing SI last night. At this time, patient endorses mild symptoms of depression including disrupted sleep, decreased energy levels, and anhedonia. Reports good appetite. Denies passive or active SI, no intent/plan to harm self or others. At home, patient takes ativan 0.5mg PRN anxiety ~2x/week. iSTOP referenced 1/17/23 (Reference #: [id number]) and confirmed ativan Rx. Denies hx of suicide attempts, prior psychiatric admissions, substance use including alcohol, tobacco, and marijuana. No access to firearms. No hx of psychosis. Denies AVH.    At this time patient is not deemed to be an acute danger to herself. No active or passive SI.

## 2023-01-17 NOTE — BH CONSULTATION LIAISON ASSESSMENT NOTE - DESCRIPTION
Patient is retired and currently resides with her daughter and son-in-law at a private residence in [place]. No substance use.

## 2023-01-17 NOTE — PROGRESS NOTE ADULT - PROBLEM SELECTOR PLAN 6
Troponin level elevated on admission, repeats essentially flat. Has known LBBB on EKG. Currently without chest pain  - trop improved from recent [facility] admission   - ECHO with wall motion abnormalities & EF 35%, cardiology consulted  - prior ECHO at [facility] EF55% w/o wall motion abnormalities, likely pt has prior MI, currently without symptoms and remains euvolemic  - d/w cardiology pt is currently optimized from cardiology stand point, however remains high risk for urgent ORIF tomorrow 1/13  - Patient is medically optimized and remains at high cardiac risk for urgent ORIF tomorrow 1/13, I and Dr. Anguiano (cardiology) have d/w this with patient and her dtr taylor in detail. Ok to proceed with OR tomorrow from medicine perspective
Troponin level elevated on admission, repeats essentially flat. Has known LBBB on EKG. Currently without chest pain  - trop improved from recent [facility] admission   - ECHO with wall motion abnormalities & EF 35%, cardiology consulted  - prior ECHO at [facility] EF55% w/o wall motion abnormalities, likely pt has prior MI, currently without symptoms and remains euvolemic  - d/w cardiology pt is currently optimized from cardiology stand point, however remains high risk for urgent ORIF tomorrow 1/13  - Patient is medically optimized and remains at high cardiac risk for urgent ORIF tomorrow 1/13, I and Dr. Anguiano (cardiology) have d/w this with patient and her dtr taylor in detail. Ok to proceed with OR tomorrow from medicine perspective
Troponin level elevated on admission, repeats essentially flat. Has known LBBB on EKG. Currently without chest pain  - trop improved from recent [facility] admission   - ECHO with wall motion abnormalities & EF 35%, cardiology consulted  - prior ECHO at [facility] EF55% w/o wall motion abnormalities, likely pt has prior MI, currently without symptoms and remains euvolemic  - d/w cardiology pt is currently optimized from cardiology stand point, however remains high risk for urgent ORIF tomorrow 1/13  - Patient is medically optimized and remains at high cardiac risk for urgent ORIF tomorrow 1/13, I and Dr. Anguiano (cardiology) have d/w this with patient and her dtr taylor in detail. Ok to proceed with OR tomorrow from medicine perspective
Will give 3 days of Ceftriaxone for uncomplicated UTI   UCX with Klebsiella / f/up sensitivity
S/P 3 days of  Ceftriaxone for uncomplicated UTI   UCX with Klebsiella.
S/P 3 days of  Ceftriaxone for uncomplicated UTI   UCX with Klebsiella

## 2023-01-17 NOTE — BH CONSULTATION LIAISON ASSESSMENT NOTE - NSBHCONSULTRECOMMENDOTHER_PSY_A_CORE FT
Plan:  - Continue routine observation  - C/w lexapro 5mg qd Plan:  - Continue routine observation  - C/w Lexapro 5mg qd

## 2023-01-17 NOTE — PROGRESS NOTE ADULT - SUBJECTIVE AND OBJECTIVE BOX
[facility] Division of Hospital Medicine  Hamlet Georges MD  Available via MS Teams      SUBJECTIVE / OVERNIGHT EVENTS:  No acute events overnight.     ADDITIONAL REVIEW OF SYSTEMS:    MEDICATIONS  (STANDING):  acetaminophen     Tablet .. 975 milliGRAM(s) Oral every 8 hours  aspirin  chewable 81 milliGRAM(s) Oral daily  atorvastatin 40 milliGRAM(s) Oral at bedtime  escitalopram 5 milliGRAM(s) Oral daily  metoprolol succinate ER 25 milliGRAM(s) Oral daily  polyethylene glycol 3350 17 Gram(s) Oral daily  rivaroxaban 20 milliGRAM(s) Oral with dinner    MEDICATIONS  (PRN):  acetaminophen   IVPB .. 1000 milliGRAM(s) IV Intermittent once PRN Severe Pain (7 - 10)  bisacodyl 5 milliGRAM(s) Oral every 12 hours PRN Constipation  ondansetron Injectable 4 milliGRAM(s) IV Push every 8 hours PRN Nausea and/or Vomiting  oxyCODONE    IR 5 milliGRAM(s) Oral every 6 hours PRN Severe Pain (7 - 10)  oxyCODONE    IR 2.5 milliGRAM(s) Oral every 4 hours PRN Moderate Pain (4 - 6)  traMADol 50 milliGRAM(s) Oral every 6 hours PRN Mild Pain (1 - 3)      I&O's Summary    16 Jan 2023 07:01  -  17 Jan 2023 07:00  --------------------------------------------------------  IN: 340 mL / OUT: 290 mL / NET: 50 mL    17 Jan 2023 07:01  -  17 Jan 2023 15:54  --------------------------------------------------------  IN: 490 mL / OUT: 250 mL / NET: 240 mL        PHYSICAL EXAM:  Vital Signs Last 24 Hrs  T(C): 36.6 (17 Jan 2023 11:33), Max: 36.6 (17 Jan 2023 11:33)  T(F): 97.9 (17 Jan 2023 11:33), Max: 97.9 (17 Jan 2023 11:33)  HR: 82 (17 Jan 2023 11:33) (70 - 86)  BP: 105/62 (17 Jan 2023 11:33) (105/62 - 131/69)  BP(mean): --  RR: 18 (17 Jan 2023 11:33) (18 - 18)  SpO2: 96% (17 Jan 2023 11:33) (93% - 96%)    Parameters below as of 17 Jan 2023 11:33  Patient On (Oxygen Delivery Method): room air    PHYSICAL EXAM:  GENERAL: NAD, well-developed  HEAD:  Atraumatic, Normocephalic  EYES:  conjunctiva and sclera clear  NECK: Supple, No JVD  CHEST/LUNG: Clear to auscultation bilaterally; No wheeze  HEART: Regular rate and rhythm; No murmurs, rubs, or gallops  ABDOMEN: Soft, Nontender, Nondistended; Bowel sounds present  EXTREMITIES:  2+ Peripheral Pulses, No clubbing, cyanosis, or edema  PSYCH: AAOx3  Skin  : Left hip with dressing in place , not removed   LABS:                        11.3   9.85  )-----------( 291      ( 16 Jan 2023 19:57 )             33.8     01-16    138  |  102  |  16  ----------------------------<  113<H>  3.9   |  25  |  0.60    Ca    8.6      16 Jan 2023 06:34    TPro  5.9<L>  /  Alb  3.3  /  TBili  0.5  /  DBili  x   /  AST  16  /  ALT  5<L>  /  AlkPhos  50  01-16              COVID-19 PCR: NotDetec (12 Jan 2023 08:45)  COVID-19 PCR: NotDetec (10 Tavo 2023 20:28)      RADIOLOGY & ADDITIONAL TESTS:  ekg, labs, micro imaging personally reviewed      COORDINATION OF CARE:  care discussed and coordinated with consultants.

## 2023-01-17 NOTE — BH CONSULTATION LIAISON ASSESSMENT NOTE - NSBHCHARTREVIEWLAB_PSY_A_CORE FT
11.3   9.85  )-----------( 291      ( 16 Jan 2023 19:57 )             33.8     01-16    138  |  102  |  16  ----------------------------<  113<H>  3.9   |  25  |  0.60    Ca    8.6      16 Jan 2023 06:34    TPro  5.9<L>  /  Alb  3.3  /  TBili  0.5  /  DBili  x   /  AST  16  /  ALT  5<L>  /  AlkPhos  50  01-16

## 2023-01-17 NOTE — PROGRESS NOTE ADULT - PROBLEM/PLAN-6
B negative RH positive s/p elective termination in [place] follows up with GYN
DISPLAY PLAN FREE TEXT

## 2023-01-17 NOTE — PROGRESS NOTE ADULT - PROBLEM SELECTOR PLAN 4
BP overall acceptable   - c/w Norvasc and Metoprolol
BP overall acceptable    c/w Norvasc and Metoprolol
BP overall acceptable    c/w Norvasc and Metoprolol.
BP overall acceptable   - c/w Norvasc and Metoprolol

## 2023-01-17 NOTE — PROGRESS NOTE ADULT - PROBLEM SELECTOR PROBLEM 1
Femoral neck fracture

## 2023-01-17 NOTE — BH CONSULTATION LIAISON ASSESSMENT NOTE - NSBHREFERDETAILS_PSY_A_CORE_FT
Admitted for femur fracture after fall. SI last night ("I want to end it all") but now denying. H/o depression on lexapro 5mg qd.

## 2023-01-17 NOTE — PROGRESS NOTE ADULT - SUBJECTIVE AND OBJECTIVE BOX
Ortho Progress Note    S: Patient seen and examined. No acute events overnight. Pain well controlled with current regimen. Denies lightheadedness/dizziness, CP/SOB. Tolerating diet.       O:  Physical Exam:  Gen: Laying in bed, NAD, alert and oriented.   Resp: Unlabored breathing  Ext: Dressing CDI         EHL/FHL/TA/Sol intact          + SILT DP/SP/HUSSEIN/Sa/Tib          +DP, extremity WWP    Vital Signs Last 24 Hrs  T(C): 36.4 (17 Jan 2023 04:51), Max: 36.8 (16 Jan 2023 08:54)  T(F): 97.5 (17 Jan 2023 04:51), Max: 98.2 (16 Jan 2023 08:54)  HR: 85 (17 Jan 2023 04:51) (78 - 86)  BP: 131/69 (17 Jan 2023 04:51) (110/66 - 131/69)  BP(mean): --  RR: 18 (17 Jan 2023 04:51) (18 - 18)  SpO2: 93% (17 Jan 2023 04:51) (92% - 94%)    Parameters below as of 17 Jan 2023 04:51  Patient On (Oxygen Delivery Method): room air                              11.3   9.85  )-----------( 291      ( 16 Jan 2023 19:57 )             33.8                         9.7    7.25  )-----------( 209      ( 16 Jan 2023 06:34 )             30.1       01-16    138  |  102  |  16  ----------------------------<  113<H>  3.9   |  25  |  0.60              A/p: 88y Female POD#3 s/p L Femoral Neck Fx Hemiarthroplasty.  VSS. NAD.    PT/OT-WBAT LLE with posterior hip precaution  FU Urine Culture  IS  DVT PPx: Xarelto and SCDs  Pain Control  Continue Current Tx as per primary team (Medicine)  Dispo planning pending PT

## 2023-01-17 NOTE — PROGRESS NOTE ADULT - PROBLEM SELECTOR PLAN 3
Believed to be 2/2 recent COVID infection. Recently saw Hematology as outpatient. Plan for Xarelto for 6 months  - Hold Xarelto in prep for possible OR  - Outpatient heme f/u
Believed to be 2/2 recent COVID infection. Recently saw Hematology as outpatient. Plan for Xarelto for 6 months  - Hold Xarelto in prep for possible OR---> restart AC once oK with the surgeon post OR   - Outpatient heme f/u
Believed to be 2/2 recent COVID infection. Recently saw Hematology as outpatient. Plan for Xarelto for 6 months  - Pt was restarted on Xarelto  as per Ortho on 1/13   - Outpatient heme f/u
Believed to be 2/2 recent COVID infection. Recently saw Hematology as outpatient. Plan for Xarelto for 6 months  Pt was restarted on Xarelto  as per Ortho on 1/13   Outpatient heme f/u
Believed to be 2/2 recent COVID infection. Recently saw Hematology as outpatient. Plan for Xarelto for 6 months  Pt was restarted on Xarelto  as per Ortho on 1/13   Outpatient heme f/u.
Believed to be 2/2 recent COVID infection. Recently saw Hematology as outpatient. Plan for Xarelto for 6 months  - Pt was restarted on Xarelto  as per Ortho on 1/13   - Outpatient heme f/u

## 2023-01-17 NOTE — BH CONSULTATION LIAISON ASSESSMENT NOTE - HPI (INCLUDE ILLNESS QUALITY, SEVERITY, DURATION, TIMING, CONTEXT, MODIFYING FACTORS, ASSOCIATED SIGNS AND SYMPTOMS)
Patient is a retired 88 year-old female living with her daughter, PPHx of depression on lexapro 5mg prescribed by PMD, no prior psychiatric admission, no hx of SAs, no substance use, no legal history. PMHx of PE on Xarelto, anxiety, depression, hyperlipidemia, MVP who presented to ED after a fall at home found to have impacted left femoral neck fracture s/p left hip hemiarthroplasty on 1/13/23. Psychiatry consulted after patient endorsed SI last night ("I want to end it all"), now denying. Patient is a retired 88 year-old female residing with her daughter in [facility], PPHx of depression and anxiety on lexapro 5mg and ativan 0.5mg PRN prescribed by PMD, no prior psychiatric admission, no hx of SAs, no substance use, no legal history. PMHx of PE on Xarelto, anxiety, depression, hyperlipidemia, MVP who presented to ED after a fall at home found to have impacted left femoral neck fracture s/p left hip hemiarthroplasty on 1/13/23. Psychiatry consulted after patient endorsed SI last night ("I want to end it all"), now denying.    Patient states she is feeling "tired" and "cranky" and wants to go home. She states her depression is okay and tends to fluctuate from day to day; worse last night in context of embarrassing event last night after receiving enema and being unable to reach bathroom. Denies endorsing SI last night. At this time, patient endorses mild symptoms of depression including disrupted sleep, decreased energy levels, and anhedonia. Reports good appetite. Denies passive or active SI, no intent/plan to harm self or others. At home, patient takes ativan 0.5mg PRN anxiety ~2x/week. Denies hx of suicide attempts, prior psychiatric admissions, substance use including alcohol, tobacco, and marijuana. No access to firearms. No hx of psychosis. Denies AVH.    Daughter confirmed above history and supplemented with additional history regarding events last night.    iSTOP referenced on 1/17/23 (Reference #: [id number]) and confirmed ativan 0.5mg last refill for 30 tabs on 10/18/22.

## 2023-01-17 NOTE — PROGRESS NOTE ADULT - PROBLEM SELECTOR PLAN 2
Story seems c/w mechanical etiology   - PT eval post OR
Story seems c/w mechanical etiology   - PT eval post OR
c/w mechanical etiology as per story    s/p left hip hemiarthroplasty on 1/13/23  PT rec [facility]
Story seems c/w mechanical etiology   - s/p left hip hemiarthroplasty on 1/13/23  - PT rec is pend
Story seems c/w mechanical etiology   - s/p left hip hemiarthroplasty on 1/13/23  - PT rec is pend
c/w mechanical etiology as per story    s/p left hip hemiarthroplasty on 1/13/23  PT rec is pend

## 2023-01-17 NOTE — PROGRESS NOTE ADULT - PROBLEM SELECTOR PLAN 7
systolic HF with EF 35% and downtrending from MI in late November  with multiple   Systolic and Diastolic heart failure   - Will need to initiate GDMT for sHF post op as per cardio team .  - c/w Metoprolol 25mg PO daily. Will initiate Entresto 24/26mg PO BID once BP allows as per Cardio   will get repeat BNP in AM / currently euvolemic
systolic HF with EF 35% and downtrending from MI in late November  with multiple   Systolic and Diastolic heart failure   Will need to initiate GDMT for sHF post op as per cardio team .  c/w Metoprolol 25mg PO daily. Will initiate Entresto 24/26mg PO BID once BP allows as per Cardio   currently euvolemic.
systolic HF with EF 35% and downtrending from MI in late November  with multiple   Systolic and Diastolic heart failure   Will need to initiate GDMT for sHF post op as per cardio team .  c/w Metoprolol 25mg PO daily. Will initiate Entresto 24/26mg PO BID once BP allows as per Cardio   currently euvolemic

## 2023-01-17 NOTE — BH CONSULTATION LIAISON ASSESSMENT NOTE - RISK ASSESSMENT
Risk factors include prior psychiatric diagnosis of depression, advanced age  Protective factors include no prior psychiatric hospitalizations, no hx of SA, no current active/passive SI, no access to firearms, strong supportive family

## 2023-01-17 NOTE — BH CONSULTATION LIAISON ASSESSMENT NOTE - NSBHCONSULTFOLLOWAFTERCARE_PSY_A_CORE FT
[facility] if patient desires, can call [phone] to schedule an appointment. Outpatient follow-up with [facility] if patient desires, can call [phone] to schedule an appointment. Also may f/u at the urgent care clinic at [phone]

## 2023-01-17 NOTE — BH CONSULTATION LIAISON ASSESSMENT NOTE - NSBHATTESTCOMMENTATTENDFT_PSY_A_CORE
Patient is a retired 88 year-old woman residing with her daughter in [place], PPHx of depression and anxiety on  Lexapro 5mg and ativan 0.5mg PRN prescribed by PMD, no prior psychiatric admission, no hx of SAs, no substance use, no legal history. PMHx of PE on Xarelto, anxiety, depression, hyperlipidemia, MVP who presented to ED after a fall at home found to have impacted left femoral neck fracture s/p left hip hemiarthroplasty on 1/13/23. Psychiatry consulted after patient endorsed SI last night ("I want to end it all"), now denying.  Patient and her daughter report that she had a bad night and was upset and denies any thoughts of suicide, and has no intent/plan to harm self or others. At home, patient takes ativan 0.5mg PRN anxiety ~2x/week. iSTOP referenced 1/17/23 (Reference #: [id number]) and confirmed ativan Rx. Denies hx of suicide attempts, prior psychiatric admissions, substance use including alcohol, tobacco, and marijuana. No access to firearms. She has no acute psychiatric symptoms and is not interested in having psychiatric follow up at this time.  [facility] psychiatry may be consulted if there are any further concerns.

## 2023-01-17 NOTE — BH CONSULTATION LIAISON ASSESSMENT NOTE - NSBHCHARTREVIEWVS_PSY_A_CORE FT
Vital Signs Last 24 Hrs  T(C): 36.6 (17 Jan 2023 11:33), Max: 36.6 (17 Jan 2023 11:33)  T(F): 97.9 (17 Jan 2023 11:33), Max: 97.9 (17 Jan 2023 11:33)  HR: 82 (17 Jan 2023 11:33) (70 - 86)  BP: 105/62 (17 Jan 2023 11:33) (105/62 - 131/69)  BP(mean): --  RR: 18 (17 Jan 2023 11:33) (18 - 18)  SpO2: 96% (17 Jan 2023 11:33) (93% - 96%)    Parameters below as of 17 Jan 2023 11:33  Patient On (Oxygen Delivery Method): room air

## 2023-01-17 NOTE — PROVIDER CONTACT NOTE (OTHER) - ASSESSMENT
Pt laying in bed upset, verbalizing that she is alone in feeling this way and stated, "I should just hang myself & end it already." RNMarge listened to the pt express her feelings & consoled her to which she stated "This isn't fair, this is too much suffering. I have gone through enough already. My medicine isn't working." Marge ANNA sat down & spoke with the pt & told her she wasn't alone in all of this, that we are all here to help, & that if she feels her medications aren't helping her anxiety & depression that she should speak w/ her PCP about trying another med or different dose. Pt verbalized appreciation and stated she was going to try to sleep. Before going to sleep she requested an Ativan but I told her that wasn't in her orders so I gave her an oxycodone to help with her pain & help her sleep. The pt has not been trying to actively harm herself    Pt verbalized this same feelings in the morning according to day RN.

## 2023-01-17 NOTE — BH CONSULTATION LIAISON ASSESSMENT NOTE - NSBHCHARTREVIEWINVESTIGATE_PSY_A_CORE FT
< from: 12 Lead ECG (01.11.23 @ 04:51) >      Ventricular Rate 99 BPM    Atrial Rate 99 BPM    P-R Interval 178 ms    QRS Duration 130 ms    Q-T Interval 382 ms    QTC Calculation(Bazett) 490 ms    P Axis 30 degrees    R Axis -26 degrees    T Axis -47 degrees    Diagnosis Line NORMAL SINUS RHYTHM  LEFT BUNDLE BRANCH BLOCK  ABNORMAL ECG  WHEN COMPARED WITH ECG OF 10-ALMAZ-2023 19:55, (UNCONFIRMED)  no  SIGNIFICANT CHANGES HAVE OCCURRED  Confirmed by GLADIS COTA MD ([id number]) on 1/15/2023 12:27:39 PM    < end of copied text >

## 2023-01-17 NOTE — PROGRESS NOTE ADULT - PROBLEM SELECTOR PLAN 5
Mood stable  - c/w SSRI and Ativan PRN at bedtime
Mood stable  - c/w SSRI and Ativan PRN at bedtime
Mood stable  c/w SSRI and Ativan PRN at bedtime.
Mood stable  - c/w SSRI and Ativan PRN at bedtime
Mood stable  c/w SSRI and Ativan PRN at bedtime
Mood stable  - c/w SSRI and Ativan PRN at bedtime

## 2023-01-18 NOTE — DISCHARGE NOTE PROVIDER - NSDCMRMEDTOKEN_GEN_ALL_CORE_FT
amLODIPine 2.5 mg oral tablet: 1 tab(s) orally once a day  Eylea 40 mg/mL intraocular solution:  intraocular  injected every 6 weeks  Lexapro 5 mg oral tablet: 1 tab(s) orally once a day  LORAZEPAM 0.5 MG TABLET: 1 tab(s) orally once a day (at bedtime), As Needed  meclizine 12.5 mg oral tablet: 1 tab(s) orally once a day, As Needed  Toprol-XL 25 mg oral tablet, extended release: 1 tab(s) orally once a day  Xarelto 20 mg oral tablet: 1 tab(s) orally once a day (in the evening)   aspirin 81 mg oral tablet, chewable: 1 tab(s) orally once a day  atorvastatin 40 mg oral tablet: 1 tab(s) orally once a day (at bedtime)  bisacodyl 5 mg oral delayed release tablet: 1 tab(s) orally every 12 hours, As needed, Constipation  escitalopram 5 mg oral tablet: 1 tab(s) orally once a day  metoprolol succinate 25 mg oral tablet, extended release: 1 tab(s) orally once a day  oxyCODONE 5 mg oral tablet: 1 tab(s) orally every 6 hours, As needed, Severe Pain (7 - 10)  polyethylene glycol 3350 oral powder for reconstitution: 17 gram(s) orally once a day  rivaroxaban 20 mg oral tablet: 1 tab(s) orally once a day (before a meal)  sacubitril-valsartan 24 mg-26 mg oral tablet: 1 tab(s) orally 2 times a day  traMADol 50 mg oral tablet: 1 tab(s) orally every 6 hours, As needed, Mild Pain (1 - 3)

## 2023-01-18 NOTE — DISCHARGE NOTE PROVIDER - PROVIDER TOKENS
PROVIDER:[TOKEN:[[id number]],FOLLOWUP:[2 weeks]],PROVIDER:[TOKEN:[[id number]],FOLLOWUP:[2 weeks]]

## 2023-01-18 NOTE — DISCHARGE NOTE PROVIDER - HOSPITAL COURSE
88-year-old female past medical history of PE on Xarelto, anxiety, depression, hyperlipidemia, MVP who presented after a fall at home found to have impacted left femoral neck fracture.  s/p L hip gadiel arthroplasty on 1/13    Pt found to have new systolic HF: EF: 35%, started on entresto on 1/15 .  Pt found to have (+) UA on 1/13.  s/p CTX x 3 days.   1/17 Pt seen by Psych for possible suicidal ideation. Pt stated "I want to end it all" then denied. it  ~2x/week. Denies hx of suicide attempts, prior psychiatric admissions, substance use including alcohol, tobacco, and marijuana. No access to firearms. No hx of psychosis.  Pt with history of depression.  Continue Lexapro 5 mg daily.  Psych deemed pt not a threat to self.     Acute issues resolved.  Discharge plan discussed with attending.  D/C to [facility]

## 2023-01-18 NOTE — PROGRESS NOTE ADULT - ASSESSMENT
88-year-old female past medical history of PE on Xarelto, anxiety, depression, hyperlipidemia, MVP who presented after a fall at home found to have impacted left femoral neck fracture
A/p: 88y Female POD#2 s/p L Femoral Neck Fx Hemiarthroplasty.  VSS. NAD.    PT/OT-WBAT LLE with posterior hip precaution  FU Urine Culture  IS  DVT PPx: Xarelto and SCDs  Pain Control  Continue Current Tx as per primary team (Medicine)  Dispo planning pending PT recommendations  
89 y/o FM s/p left hip hemiarthroplasty POD#1, physical therapy WBAT, Posterior total hip precautions  Charis Sarabia PA-C  Orthopaedic Surgery  Team pager [phone]  [facility] [phone]  [phone]  
A/p: 88y Female POD#2 s/p L Femoral Neck Fx Hemiarthroplasty.  VSS. NAD.    PT/OT-WBAT LLE with posterior hip precaution  FU Urine Culture  IS  DVT PPx: Xarelto and SCDs  Pain Control  Continue Current Tx as per primary team (Medicine)  Dispo planning pending PT recommendations    Richi James PA-C  Orthopedic Surgery Team  Team Pager: [phone]
Patient seen and examined, agree with above assessment and plan as transcribed above.    - d/c planning for today  - F/U with Dr. Annmarie Morris MD, [facility]  BEEPER [phone]  
88-year-old female past medical history of PE on Xarelto, anxiety, depression, hyperlipidemia, MVP who presented after a fall at home found to have impacted left femoral neck fracture s/p left hip hemiarthroplasty on 1/13/23.  
88-year-old female past medical history of PE on Xarelto, anxiety, depression, hyperlipidemia, MVP who presented after a fall at home found to have impacted left femoral neck fracture
88-year-old female past medical history of PE on Xarelto, anxiety, depression, hyperlipidemia, MVP who presented after a fall at home found to have impacted left femoral neck fracture s/p left hip hemiarthroplasty on 1/13/23.

## 2023-01-18 NOTE — DISCHARGE NOTE NURSING/CASE MANAGEMENT/SOCIAL WORK - NSDCPEFALRISK_GEN_ALL_CORE
For information on Fall & Injury Prevention, visit: [email or web address] OR  [email or web address] OR  https://www.cdc.gov/steadi/patient.html

## 2023-01-18 NOTE — DISCHARGE NOTE PROVIDER - ATTENDING DISCHARGE PHYSICAL EXAMINATION:
T(C): 36.7 (01-18-23 @ 12:16), Max: 36.8 (01-18-23 @ 08:31)  HR: 76 (01-18-23 @ 12:16) (67 - 79)  BP: 104/67 (01-18-23 @ 12:16) (104/64 - 127/76)  RR: 18 (01-18-23 @ 12:16) (18 - 18)  SpO2: 95% (01-18-23 @ 12:16) (94% - 96%)  General: NAD, comfortable  Eyes: no conjunctival erythema  ENT: MMM  Neck: Neck supple, No JVD  Respiratory: CTA B/L, No wheezing, rales, rhonchi  CV: RRR no murmurs  Abdominal: Soft, NT, ND +BS  MSK: no focal weakness  Extremities: No edema, 2+ peripheral pulses  Neurology: A&Ox3, nonfocal, LUJAN x 4  Skin: No Rashes, Hematoma, Ecchymosis  Psych: Calm and appropriate

## 2023-01-18 NOTE — DISCHARGE NOTE NURSING/CASE MANAGEMENT/SOCIAL WORK - PATIENT PORTAL LINK FT
You can access the FollowMyHealth Patient Portal offered by [facility] by registering at the following website: [email or web address]. By joining [employer]’s FollowMyHealth portal, you will also be able to view your health information using other applications (apps) compatible with our system.

## 2023-01-18 NOTE — DISCHARGE NOTE PROVIDER - NSDCFUADDAPPT_GEN_ALL_CORE_FT
APPTS ARE READY TO BE MADE: [X ] YES    Best Family or Patient Contact (if needed):    Additional Information about above appointments (if needed):    1:   2:   3:     Other comments or requests:    APPTS ARE READY TO BE MADE: [X ] YES    Best Family or Patient Contact (if needed):    Additional Information about above appointments (if needed):    1:   2:   3:     Other comments or requests:   Patient is being discharged to rehab. Caregiver will arrange follow up.

## 2023-01-18 NOTE — PROGRESS NOTE ADULT - PROVIDER SPECIALTY LIST ADULT
Hospitalist
Internal Medicine
Orthopedics
Orthopedics
Cardiology
Hospitalist
Orthopedics
Orthopedics
Cardiology
Cardiology
Orthopedics
Hospitalist

## 2023-01-18 NOTE — PROGRESS NOTE ADULT - SUBJECTIVE AND OBJECTIVE BOX
CARDIOLOGY COVERING FOR DR. ANGUIANO     DATE OF SERVICE: 01-18-23    Patient denies chest pain or shortness of breath.   Review of systems otherwise negative.  	  MEDICATIONS:  MEDICATIONS  (STANDING):  aspirin  chewable 81 milliGRAM(s) Oral daily  atorvastatin 40 milliGRAM(s) Oral at bedtime  escitalopram 5 milliGRAM(s) Oral daily  hemorrhoidal Ointment 1 Application(s) Rectal daily  metoprolol succinate ER 25 milliGRAM(s) Oral daily  polyethylene glycol 3350 17 Gram(s) Oral daily  rivaroxaban 20 milliGRAM(s) Oral with dinner  sacubitril 24 mG/valsartan 26 mG 1 Tablet(s) Oral two times a day      LABS:	 	    CARDIAC MARKERS:                                11.3   9.85  )-----------( 291      ( 16 Jan 2023 19:57 )             33.8     Hemoglobin: 11.3 g/dL (01-16 @ 19:57)  Hemoglobin: 9.7 g/dL (01-16 @ 06:34)  Hemoglobin: 11.1 g/dL (01-14 @ 06:40)  Hemoglobin: 11.3 g/dL (01-13 @ 16:28)            Creatinine Trend: 0.60<--, 0.54<--, 0.52<--, 0.56<--, 0.63<--, 0.66<--    COAGS:       proBNP:   Lipid Profile:   HgA1c:   TSH:       PHYSICAL EXAM:  T(C): 36.7 (01-18-23 @ 12:16), Max: 36.8 (01-18-23 @ 08:31)  HR: 76 (01-18-23 @ 12:16) (67 - 79)  BP: 104/67 (01-18-23 @ 12:16) (104/64 - 127/76)  RR: 18 (01-18-23 @ 12:16) (18 - 18)  SpO2: 95% (01-18-23 @ 12:16) (94% - 96%)  Wt(kg): --  I&O's Summary    17 Jan 2023 07:01  -  18 Jan 2023 07:00  --------------------------------------------------------  IN: 960 mL / OUT: 650 mL / NET: 310 mL    18 Jan 2023 07:01  -  18 Jan 2023 15:44  --------------------------------------------------------  IN: 360 mL / OUT: 0 mL / NET: 360 mL      Gen: NAD  HEENT:  (-)icterus (-)pallor  CV: N S1 S2 1/6 MINERVA (+)2 Pulses B/l  Resp:  Clear to auscultation B/L, normal effort  GI: (+) BS Soft, NT, ND  Lymph:  (-)Edema, (-)obvious lymphadenopathy  Skin: Warm to touch, Normal turgor  Psych: Appropriate mood and affect      TELEMETRY: None 	      ASSESSMENT/PLAN: 88-year-old female past medical history of PE on Xarelto, anxiety, depression, hyperlipidemia, MVP who presented after a fall at home found to have impacted left femoral neck fracture now s/p L hemiarthroplasty. Tolerated procedure well from CV perspective.    #Acute HFrEF  - Troponin level elevated on admission, repeats essentially flat and negative CK. Likely demand in setting of systolic HF with EF 35% and downtrending from MI in late November   - [facility] hospitalization records obtained from November 24, 2022: Admitted there with chest pain, found to have as noted on CTA report "small volume PE in the left upper lobe segmental branches and ? RLL subsegmental branches". Trop noted to be 1597 but was never trended to peak, EKG there with LBBB  - Remains chest pain free and euvolemic  - Continue ASA 81mg daily, atorvastatin 40mg PO daily, Metoprolol as medical management for presumed CAD.   - Continue GDMT with Toprol XL and Entresto  - Plan for conservative medical management and further outpatient workup with Dr. Anugiano given acute issues while recovering from surgery    #PE  - Continue Xarelto per primary team    #HTN  - Continue Metoprolol and Entresto    - No further inpatient cardiac w/u planned  - D/C planning to [facility] per primary team  - Patient to f/u with cardiologist Dr. Anguiano for further workup after discharge    Geoff Pineda PA-C  Pager: [phone]

## 2023-01-18 NOTE — DISCHARGE NOTE PROVIDER - CARE PROVIDER_API CALL
Cheryl Lopez  INTERNAL MEDICINE  [address], [address]  [place] [address]  Phone: [phone]  Fax: [phone]  Follow Up Time: 2 weeks    Estrada Gatica)  Orthopaedic Surgery  [address], [address]  [place] [address]  Phone: [phone]  Fax: [phone]  Follow Up Time: 2 weeks

## 2023-01-18 NOTE — PROGRESS NOTE ADULT - SUBJECTIVE AND OBJECTIVE BOX
Patient seen and examined at bedside. Pain well controlled with medication. Patient denies any numbness, tingling, weakness, or any other orthopaedic complaint.     LABS:                        11.3   9.85  )-----------( 291      ( 16 Jan 2023 19:57 )             33.8     01-16    138  |  102  |  16  ----------------------------<  113<H>  3.9   |  25  |  0.60    Ca    8.6      16 Jan 2023 06:34    TPro  5.9<L>  /  Alb  3.3  /  TBili  0.5  /  DBili  x   /  AST  16  /  ALT  5<L>  /  AlkPhos  50  01-16          VITAL SIGNS:  T(C): 36.7 (01-18-23 @ 04:58), Max: 36.7 (01-17-23 @ 20:03)  HR: 74 (01-18-23 @ 04:58) (70 - 82)  BP: 127/76 (01-18-23 @ 04:58) (104/64 - 127/76)  RR: 18 (01-18-23 @ 04:58) (18 - 18)  SpO2: 96% (01-18-23 @ 04:58) (94% - 96%)    Physical Exam:  Gen: Laying in bed, NAD, alert and oriented.   Resp: Unlabored breathing  LLE : Dressing CDI         EHL/FHL/TA/Sol intact          + SILT DP/SP/HUSSEIN/Sa/Tib          +DP, extremity WWP          B/l calfs NTTP      A/p: 88y Female POD#5 s/p L Femoral Neck Fx Hemiarthroplasty.  VSS. NAD.    PT/OT-WBAT LLE with posterior hip precaution  FU Urine Culture  IS  DVT PPx: Xarelto and SCDs  Pain Control  Continue Current Tx as per primary team (Medicine)  Dispo [facility]

## 2023-01-18 NOTE — DISCHARGE NOTE PROVIDER - CARE PROVIDERS DIRECT ADDRESSES
[email or web address]

## 2023-01-18 NOTE — DISCHARGE NOTE PROVIDER - NSDCCPCAREPLAN_GEN_ALL_CORE_FT
PRINCIPAL DISCHARGE DIAGNOSIS  Diagnosis: Fracture of femoral neck, left  Assessment and Plan of Treatment: You had a left hip gadiel arthroplasty on 1/13  Discharge to rehab for conditioning and strenghening      SECONDARY DISCHARGE DIAGNOSES  Diagnosis: Acute CHF  Assessment and Plan of Treatment: Weigh yourself daily.  If you gain 3lbs in 3 days, or 5lbs in a week call your Health Care Provider.  Do not eat or drink foods containing more than 2000mg of salt (sodium) in your diet every day.  Call your Health Care Provider if you have any swelling or increased swelling in your feet, ankles, and/or stomach.  The Pt was provided with CHF diet instruction (low sodium diet, daily weights, label reading, Heart Healthy Cooking Tips & Heart Healthy shopping Tips).  Take all of your medication as directed.  If you become dizzy call your Health Care Provider.    Diagnosis: H/O: depression  Assessment and Plan of Treatment: Continue Lexapro

## 2023-03-05 NOTE — PRE-OP CHECKLIST - ALLERGIES REVIEWED
Patient  Has  Been  On  100%  NRB  Mask  All  Day.  Set  Up  Airvo  On  Patient   At  This  Time  To  Allow  Patient  Mobility and  Eating  [place].  Patient  Tolerating  Well  An  Holding  Saturations  Above  95%.  Patient  Currently  At  40 lpm  And  85%.      done

## 2023-05-02 PROBLEM — I26.99 PULMONARY EMBOLUS: Status: ACTIVE | Noted: 2022-01-01

## 2023-05-02 PROBLEM — I50.9 CONGESTIVE HEART FAILURE (CHF): Status: ACTIVE | Noted: 2023-01-01

## 2023-05-03 NOTE — CONSULT NOTE ADULT - SUBJECTIVE AND OBJECTIVE BOX
DATE OF SERVICE: 05-03-23 @ 12:11    CHIEF COMPLAINT:Patient is a 88y old  Female who presents with a chief complaint of     HISTORY OF PRESENT ILLNESS:HPI:      PAST MEDICAL & SURGICAL HISTORY:  Anxiety      Depression      Diverticulitis      Macular degeneration  right eye      MVP (mitral valve prolapse)      OA (osteoarthritis)  knee and hip      Hyperlipidemia      Status post cholecystectomy      S/P left cataract extraction  right and left              MEDICATIONS:                  FAMILY HISTORY:  No pertinent family history in first degree relatives        Non-contributory    SOCIAL HISTORY:    [ ] Tobacco  [ ] Drugs  [ ] Alcohol    Allergies    No Known Allergies    Intolerances    	    REVIEW OF SYSTEMS:  CONSTITUTIONAL: No fever  EYES: No eye pain, visual disturbances, or discharge  ENMT:  No difficulty hearing, tinnitus  NECK: No pain or stiffness  RESPIRATORY: No cough, wheezing,  CARDIOVASCULAR: No chest pain, palpitations, passing out, dizziness, or leg swelling  GASTROINTESTINAL:  No nausea, vomiting, diarrhea or constipation. No melena.  GENITOURINARY: No dysuria, hematuria  NEUROLOGICAL: No stroke like symptoms  SKIN: No burning or lesions   ENDOCRINE: No heat or cold intolerance  MUSCULOSKELETAL: No joint pain or swelling  PSYCHIATRIC: No  anxiety, mood swings  HEME/LYMPH: No bleeding gums  ALLERGY AND IMMUNOLOGIC: No hives or eczema	    All other ROS negative    PHYSICAL EXAM:  T(C): 37.7 (05-03-23 @ 08:57), Max: 37.7 (05-03-23 @ 08:57)  HR: 104 (05-03-23 @ 08:57) (102 - 104)  BP: 136/70 (05-03-23 @ 08:57) (136/70 - 136/82)  RR: 22 (05-03-23 @ 08:57) (16 - 22)  SpO2: 99% (05-03-23 @ 08:57) (98% - 99%)  Wt(kg): --  I&O's Summary      Appearance: Normal	  HEENT:   Normal oral mucosa, EOMI	  Cardiovascular:  S1 S2, No JVD,    Respiratory: Lungs clear to auscultation	  Psychiatry: Alert  Gastrointestinal:  Soft, Non-tender, + BS	  Skin: No rashes   Neurologic: Non-focal  Extremities:  No edema  Vascular: Peripheral pulses palpable    	    	  	  CARDIAC MARKERS:  Labs personally reviewed by me                                  13.0   11.05 )-----------( 203      ( 03 May 2023 08:58 )             41.6     05-03    144  |  106  |  20  ----------------------------<  113<H>  3.3<L>   |  23  |  0.54    Ca    8.3<L>      03 May 2023 08:58  Mg     1.8     05-03    TPro  6.3  /  Alb  3.3  /  TBili  0.7  /  DBili  x   /  AST  19  /  ALT  5<L>  /  AlkPhos  77  05-03          EKG: Personally reviewed by me -   Radiology: Personally reviewed by me -       Assessment /Plan:       Differential diagnosis and plan of care discussed with patient after the evaluation. Counseling on diet, nutritional counseling, weight management, exercise and medication compliance was done.   Advanced care planning/advanced directives discussed with patient/family. DNR status including forceful chest compressions to attempt to restart the heart, ventilator support/artificial breathing, electric shock, artificial nutrition, health care proxy, Molst form all discussed with pt. Pt wishes to consider. More than fifteen minutes spent on discussing advanced directives.       Clyde Anguiano DO [facility]  Cardiovascular Medicine  [address] [place] [address] Dr, [address]  Available for call or text via [employer] TE[employer]  Office [phone]   DATE OF SERVICE: 05-03-23 @ 17:37    CHIEF COMPLAINT:Patient is a 88y old  Female who presents with a chief complaint of CP and SOB (03 May 2023 17:10)      HISTORY OF PRESENT ILLNESS:  87 y/o F  with a h/o HFrEF, LBBB, PE previously on xarelto (no longer), depression, HLD, Femoral neck fracture in January s/p L hip arthroplasty presenting with worsening chest pain and shortness of breath. After hemiarthroplasty in January. patient never recovered full strength. Continued to be deconditioned which has progressed to the last 2 weeks where she is essentially bed bound. Poor po intake but denies weight loss/night sweats. Denies dark stool. Over the past few weeks, has noted that movement is limited 2/2 to sob, improves with rest. Denies changes in diet/recent sick contacts/changes in medication. Over the last 2 days noted new onset cp with minimal exertion (movement for changing). At this point presented to the ed.    In the ED afeb hds. sat 99 on 2L NC. Labs notable for proBNP 9K (previously 4K). EKG with unchanged LBBB. CT Chest/A/P with b/l pleural effusions with extensive thoracic and retroperitoneal lymphadenopathy increased since January CT. Cards consulted, received 40 IV lasix *1.       PAST MEDICAL & SURGICAL HISTORY:  Anxiety      Depression      Diverticulitis      Macular degeneration  right eye      MVP (mitral valve prolapse)      OA (osteoarthritis)  knee and hip      Hyperlipidemia      Status post cholecystectomy      S/P left cataract extraction  right and left              MEDICATIONS:  aspirin  chewable 81 milliGRAM(s) Oral daily  enoxaparin Injectable 30 milliGRAM(s) SubCutaneous every 24 hours  metoprolol succinate ER 25 milliGRAM(s) Oral daily  sacubitril 24 mG/valsartan 26 mG 1 Tablet(s) Oral two times a day        escitalopram 5 milliGRAM(s) Oral daily  LORazepam     Tablet 1 milliGRAM(s) Oral at bedtime PRN        potassium chloride    Tablet ER 40 milliEquivalent(s) Oral once      FAMILY HISTORY:  No pertinent family history in first degree relatives        Non-contributory    SOCIAL HISTORY:    [ -] Tobacco  [ -] Drugs  [ -] Alcohol    Allergies    No Known Allergies    Intolerances    	    REVIEW OF SYSTEMS:  CONSTITUTIONAL: No fever, generalized weakness  EYES: No eye pain, visual disturbances, or discharge  ENMT:  No difficulty hearing, tinnitus  NECK: No pain or stiffness  RESPIRATORY: No cough, wheezing,  CARDIOVASCULAR: + chest pain, palpitations, passing out, dizziness, + leg swelling (L>R)  GASTROINTESTINAL:  No nausea, vomiting, diarrhea or constipation. No melena.  GENITOURINARY: No dysuria, hematuria  NEUROLOGICAL: No stroke like symptoms  SKIN: No burning or lesions   ENDOCRINE: No heat or cold intolerance  MUSCULOSKELETAL: No joint pain or swelling  PSYCHIATRIC: No  anxiety, mood swings  HEME/LYMPH: No bleeding gums  ALLERGY AND IMMUNOLOGIC: No hives or eczema	    All other ROS negative    PHYSICAL EXAM:  T(C): 36.6 (05-03-23 @ 15:46), Max: 37.7 (05-03-23 @ 08:57)  HR: 94 (05-03-23 @ 15:00) (81 - 104)  BP: 124/862 (05-03-23 @ 15:46) (103/67 - 136/82)  RR: 22 (05-03-23 @ 15:46) (16 - 22)  SpO2: 99% (05-03-23 @ 15:46) (98% - 100%)  Wt(kg): --  I&O's Summary      Appearance: Normal	  HEENT:   Normal oral mucosa, EOMI	  Cardiovascular:  S1 S2, No JVD,    Respiratory: diminished B/L  Psychiatry: Alert  Gastrointestinal:  Soft, Non-tender, + BS	  Skin: No rashes   Neurologic: Non-focal  Extremities:  + edema (L>R)  Vascular: Peripheral pulses palpable    	    	  	  CARDIAC MARKERS:  Labs personally reviewed by me                                  13.0   11.05 )-----------( 203      ( 03 May 2023 08:58 )             41.6     05-03    144  |  106  |  20  ----------------------------<  113<H>  3.3<L>   |  23  |  0.54    Ca    8.3<L>      03 May 2023 08:58  Mg     1.8     05-03    TPro  6.3  /  Alb  3.3  /  TBili  0.7  /  DBili  x   /  AST  19  /  ALT  5<L>  /  AlkPhos  77  05-03          EKG: Personally reviewed by me - ST LBBB  Radiology: Personally reviewed by me -     < from: CT Angio Abdomen and Pelvis w/ IV Cont (05.03.23 @ 09:57) >    No intramural hematoma or aortic dissection.    Extensive thoracic and retroperitoneal lymphadenopathy, some of which is   hyperdense in nature. The largest lymph node is seen in the right axilla   measuring 4 x 2.6 cm. These have markedly increased since 1/11/2023   examination. Differential includes lymphoma, metastatic disease from   hyperdense tumor, or densities such as Castleman's disease.    Small-to-moderate bilateral pleural effusions with right middle and lower   lobe atelectasis and partial left lower lobe atelectasis.    Remaining findings as described above.    < end of copied text >  < from: CT Head No Cont (05.03.23 @ 09:55) >    If symptoms continue MRI of the brain can be done for further evaluation   if there are no contraindications.    < end of copied text >  < from: TTE with Doppler (w/Cont) (01.11.23 @ 09:27) >  Conclusions:  1. Mitral annular calcification and calcified mitral  leaflets with decreased diastolic opening. Mild mitral  regurgitation.  Peak mitral valve gradient equals 13 mm Hg,  mean transmitral valve gradient equals 6 mm Hg, consistent  with moderate mitral stenosis. Gradients measured at a HR  of 105bpm, consider re-evlaution when tachycardia has  resolved.  2. Calcified aortic valve. Peak transaortic valve gradient  equals 10 mm Hg, mean transaortic valve gradient equals 6  mm Hg, estimated aortic valve area equals 1.8 sqcm (by  continuity equation), aortic valve velocity time integral  equals 31 cm, consistent with mild aortic stenosis. Minimal  aortic regurgitation.  3. Moderate segmental left ventricular systolic  dysfunction. Endocardial visualization enhanced with  intravenous injection of Ultrasonic Enhancing Agent  (Definity). No left ventricular thrombus. The apical  inferior wall, the apical anterior wall, the apical septum,  the basal anterolateral wall, the mid anterior wall, and  the mid anterolateral wall are hypokinetic.  4. Severe reversible diastolic dysfunction (Stage III).  5. Normal right ventricular size and function.  *** No previous Echo exam.        Assessment /Plan:       Differential diagnosis and plan of care discussed with patient after the evaluation. Counseling on diet, nutritional counseling, weight management, exercise and medication compliance was done.   Advanced care planning/advanced directives discussed with patient/family. DNR status including forceful chest compressions to attempt to restart the heart, ventilator support/artificial breathing, electric shock, artificial nutrition, health care proxy, Molst form all discussed with pt. Pt wishes to consider. More than fifteen minutes spent on discussing advanced directives.       Clyde Anguiano DO [facility]  Cardiovascular Medicine  [address] [place] [address] Dr, [address]  Available for call or text via [employer] TE[employer]  Office [phone]   DATE OF SERVICE: 05-03-23 @ 17:37    CHIEF COMPLAINT:Patient is a 88y old  Female who presents with a chief complaint of CP and SOB (03 May 2023 17:10)      HISTORY OF PRESENT ILLNESS:  89 y/o F  with a h/o HFrEF, LBBB, PE previously on xarelto (no longer), depression, HLD, Femoral neck fracture in January s/p L hip arthroplasty presenting with worsening chest pain and shortness of breath. After hemiarthroplasty in January. patient never recovered full strength. Continued to be deconditioned which has progressed to the last 2 weeks where she is essentially bed bound. Poor po intake but denies weight loss/night sweats. Denies dark stool. Over the past few weeks, has noted that movement is limited 2/2 to sob, improves with rest. Denies changes in diet/recent sick contacts/changes in medication. Over the last 2 days noted new onset cp with minimal exertion (movement for changing). At this point presented to the ed.    In the ED afeb hds. sat 99 on 2L NC. Labs notable for proBNP 9K (previously 4K). EKG with unchanged LBBB. CT Chest/A/P with b/l pleural effusions with extensive thoracic and retroperitoneal lymphadenopathy increased since January CT. Cards consulted, received 40 IV lasix *1.       PAST MEDICAL & SURGICAL HISTORY:  Anxiety      Depression      Diverticulitis      Macular degeneration  right eye      MVP (mitral valve prolapse)      OA (osteoarthritis)  knee and hip      Hyperlipidemia      Status post cholecystectomy      S/P left cataract extraction  right and left              MEDICATIONS:  aspirin  chewable 81 milliGRAM(s) Oral daily  enoxaparin Injectable 30 milliGRAM(s) SubCutaneous every 24 hours  metoprolol succinate ER 25 milliGRAM(s) Oral daily  sacubitril 24 mG/valsartan 26 mG 1 Tablet(s) Oral two times a day        escitalopram 5 milliGRAM(s) Oral daily  LORazepam     Tablet 1 milliGRAM(s) Oral at bedtime PRN        potassium chloride    Tablet ER 40 milliEquivalent(s) Oral once      FAMILY HISTORY:  No pertinent family history in first degree relatives        Non-contributory    SOCIAL HISTORY:    [ -] Tobacco  [ -] Drugs  [ -] Alcohol    Allergies    No Known Allergies    Intolerances    	    REVIEW OF SYSTEMS:  CONSTITUTIONAL: No fever, generalized weakness  EYES: No eye pain, visual disturbances, or discharge  ENMT:  No difficulty hearing, tinnitus  NECK: No pain or stiffness  RESPIRATORY: No cough, wheezing,  CARDIOVASCULAR: + chest pain, palpitations, passing out, dizziness, + leg swelling (L>R)  GASTROINTESTINAL:  No nausea, vomiting, diarrhea or constipation. No melena.  GENITOURINARY: No dysuria, hematuria  NEUROLOGICAL: No stroke like symptoms  SKIN: No burning or lesions   ENDOCRINE: No heat or cold intolerance  MUSCULOSKELETAL: No joint pain or swelling  PSYCHIATRIC: No  anxiety, mood swings  HEME/LYMPH: No bleeding gums  ALLERGY AND IMMUNOLOGIC: No hives or eczema	    All other ROS negative    PHYSICAL EXAM:  T(C): 36.6 (05-03-23 @ 15:46), Max: 37.7 (05-03-23 @ 08:57)  HR: 94 (05-03-23 @ 15:00) (81 - 104)  BP: 124/862 (05-03-23 @ 15:46) (103/67 - 136/82)  RR: 22 (05-03-23 @ 15:46) (16 - 22)  SpO2: 99% (05-03-23 @ 15:46) (98% - 100%)  Wt(kg): --  I&O's Summary      Appearance: Normal	  HEENT:   Normal oral mucosa, EOMI	  Cardiovascular:  S1 S2, No JVD,    Respiratory: diminished B/L  Psychiatry: Alert  Gastrointestinal:  Soft, Non-tender, + BS	  Skin: No rashes   Neurologic: Non-focal  Extremities:  + edema (L>R)  Vascular: Peripheral pulses palpable    	    	  	  CARDIAC MARKERS:  Labs personally reviewed by me                                  13.0   11.05 )-----------( 203      ( 03 May 2023 08:58 )             41.6     05-03    144  |  106  |  20  ----------------------------<  113<H>  3.3<L>   |  23  |  0.54    Ca    8.3<L>      03 May 2023 08:58  Mg     1.8     05-03    TPro  6.3  /  Alb  3.3  /  TBili  0.7  /  DBili  x   /  AST  19  /  ALT  5<L>  /  AlkPhos  77  05-03          EKG: Personally reviewed by me - ST LBBB  Radiology: Personally reviewed by me -     < from: CT Angio Abdomen and Pelvis w/ IV Cont (05.03.23 @ 09:57) >    No intramural hematoma or aortic dissection.    Extensive thoracic and retroperitoneal lymphadenopathy, some of which is   hyperdense in nature. The largest lymph node is seen in the right axilla   measuring 4 x 2.6 cm. These have markedly increased since 1/11/2023   examination. Differential includes lymphoma, metastatic disease from   hyperdense tumor, or densities such as Castleman's disease.    Small-to-moderate bilateral pleural effusions with right middle and lower   lobe atelectasis and partial left lower lobe atelectasis.    Remaining findings as described above.    < end of copied text >  < from: CT Head No Cont (05.03.23 @ 09:55) >    If symptoms continue MRI of the brain can be done for further evaluation   if there are no contraindications.    < end of copied text >  < from: TTE with Doppler (w/Cont) (01.11.23 @ 09:27) >  Conclusions:  1. Mitral annular calcification and calcified mitral  leaflets with decreased diastolic opening. Mild mitral  regurgitation.  Peak mitral valve gradient equals 13 mm Hg,  mean transmitral valve gradient equals 6 mm Hg, consistent  with moderate mitral stenosis. Gradients measured at a HR  of 105bpm, consider re-evlaution when tachycardia has  resolved.  2. Calcified aortic valve. Peak transaortic valve gradient  equals 10 mm Hg, mean transaortic valve gradient equals 6  mm Hg, estimated aortic valve area equals 1.8 sqcm (by  continuity equation), aortic valve velocity time integral  equals 31 cm, consistent with mild aortic stenosis. Minimal  aortic regurgitation.  3. Moderate segmental left ventricular systolic  dysfunction. Endocardial visualization enhanced with  intravenous injection of Ultrasonic Enhancing Agent  (Definity). No left ventricular thrombus. The apical  inferior wall, the apical anterior wall, the apical septum,  the basal anterolateral wall, the mid anterior wall, and  the mid anterolateral wall are hypokinetic.  4. Severe reversible diastolic dysfunction (Stage III).  5. Normal right ventricular size and function.  *** No previous Echo exam.        Assessment /Plan:     89 y/o F  with a h/o HFrEF, LBBB, PE previously on xarelto (no longer), depression, HLD, Femoral neck fracture in January s/p L hip arthroplasty presenting with worsening chest pain and shortness of breath. After hemiarthroplasty in January. patient never recovered full strength. Continued to be deconditioned which has progressed to the last 2 weeks where she is essentially bed bound. Poor po intake but denies weight loss/night sweats. Denies dark stool. Over the past few weeks, has noted that movement is limited 2/2 to sob, improves with rest. Denies changes in diet/recent sick contacts/changes in medication. Over the last 2 days noted new onset cp with minimal exertion (movement for changing).       Problem/Plan - 1:  ·  Problem: Chronic Systolic HF  ·  Plan:   - [facility] hospitalization records obtained from November 24, 2022: Admitted there with chest pain, found to have as noted on CTA report "small volume PE in the left upper lobe segmental branches and ? RLL subsegmental branches"  - Trop noted to be 1597 but was never trended to peak, EKG there with LBBB consistent with MI. Found to have newly reduced EF last admission.   - BNP elevated ~9K  - CT Chest with small to moderate pleural effusions  - c/w ASA 81mg PO daily, Metoprolol and Entresto   - C/w Lasix 40mg IVP daily.   - Appreciate strict I&Os, daily weights    Problem/Plan - 2:  ·  Problem: Chest Pain  ·  Plan: Patient reports progressive SOB and midsternal CP with minimal exertion for the past 2 weeks.   - Trop 51 --> 49 (lower than previous admission)  - EKG ST with LBBB (unchanged from baseline)  - CTA with no PE/Dissection but diffuse lymphadenopathy suggestive of malignancy  - c/w medical mgmt for presumed CAD with ASA, BB  - Can add Imdur if symptoms persist.  - Obtain formal TTE     Problem/Plan - 3:  ·  Problem: Pulmonary embolism.   ·  Plan:    - CTA with no PE noted  - LE US neg for DVT  - Can discontinue AC for now since there is no active indication  - c/w lovenox for DVT PPx    Problem/Plan - 4:  ·  Problem: Essential hypertension.   ·  Plan: BP overall acceptable   - c/w Metoprolol and Entresto  - c/w Lasix 40mg IVP daily    Differential diagnosis and plan of care discussed with patient after the evaluation. Counseling on diet, nutritional counseling, weight management, exercise and medication compliance was done.   Advanced care planning/advanced directives discussed with patient/family. DNR status including forceful chest compressions to attempt to restart the heart, ventilator support/artificial breathing, electric shock, artificial nutrition, health care proxy, Molst form all discussed with pt. Pt wishes to consider. More than fifteen minutes spent on discussing advanced directives.     Raysa Vásquez [facility]  Clyde Anguiano DO [facility]  Cardiovascular Medicine  [address] [place] [address] Dr, [address]  Available for call or text via [employer]  Office [phone]

## 2023-05-03 NOTE — H&P ADULT - TIME BILLING
reviewing emr, labs, imaging, coordination of care, discussion with patient and daughter, documentation

## 2023-05-03 NOTE — PATIENT PROFILE ADULT - NSPROPOAPRESSUREINJURY_GEN_A_NUR
General Information:    1.  Do not drink alcoholic beverages including beer for 24 hours or as long as you are on pain medication..  2.  Do not drive a motor vehicle, operate machinery or power tools, or signs legal papers for 24 hours or as long as you are on pain medication.   3.  You may experience light-headedness, dizziness, and sleepiness following surgery. Please do not stay alone. A responsible adult should be with you for this 24 hour period.  4.  Go home and rest.    5. Progress slowly to a normal diet unless instructed.  Otherwise, begin with liquids such as soft drinks, then soup and crackers working up to solid foods. Drink plenty of nonalcoholic fluids.  6.  Certain anesthetics and pain medications produce nausea and vomiting in certain       individuals. If nausea becomes a problem at home, call you doctor.    7. A nurse will be calling you sometime after surgery. Do not be alarmed. This is our way of finding out how you are doing.    8. Several times every hour while you are awake, take 2-3 deep breaths and cough. If you had stomach surgery hold a pillow or rolled towel firmly against your stomach before you cough. This will help with any pain the cough might cause.  9. Several times every hour while you are awake, pump and flex your feet 5-6 times and do foot circles. This will help prevent blood clots.    10.Call your doctor for severe pain, bleeding, fever, or signs or symptoms of infection (pain, swelling, redness, foul odor, drainage).    Discharge Instructions: After Your Surgery/Procedure  Youve just had surgery. During surgery you were given medicine called anesthesia to keep you relaxed and free of pain. After surgery you may have some pain or nausea. This is common. Here are some tips for feeling better and getting well after surgery.     Stay on schedule with your medication.   Going home  Your doctor or nurse will show you how to take care of yourself when you go home. He or she will  "also answer your questions. Have an adult family member or friend drive you home.      For your safety we recommend these precaution for the first 24 hours after your procedure:  · Do not drive or use heavy equipment.  · Do not make important decisions or sign legal papers.  · Do not drink alcohol.  · Have someone stay with you, if needed. He or she can watch for problems and help keep you safe.  · Your concentration, balance, coordination, and judgement may be impaired for many hours after anesthesia.  Use caution when ambulating or standing up.     · You may feel weak and "washed out" after anesthesia and surgery.      Subtle residual effects of general anesthesia or sedation with regional / local anesthesia can last more than 24 hours.  Rest for the remainder of the day or longer if your Doctor/Surgeon has advised you to do so.  Although you may feel normal within the first 24 hours, your reflexes and mental ability may be impaired without you realizing it.  You may feel dizzy, lightheaded or sleepy for 24 hours or longer.      Be sure to go to all follow-up visits with your doctor. And rest after your surgery for as long as your doctor tells you to.  Coping with pain  If you have pain after surgery, pain medicine will help you feel better. Take it as told, before pain becomes severe. Also, ask your doctor or pharmacist about other ways to control pain. This might be with heat, ice, or relaxation. And follow any other instructions your surgeon or nurse gives you.  Tips for taking pain medicine  To get the best relief possible, remember these points:  · Pain medicines can upset your stomach. Taking them with a little food may help.  · Most pain relievers taken by mouth need at least 20 to 30 minutes to start to work.  · Taking medicine on a schedule can help you remember to take it. Try to time your medicine so that you can take it before starting an activity. This might be before you get dressed, go for a walk, " or sit down for dinner.  · Constipation is a common side effect of pain medicines. Call your doctor before taking any medicines such as laxatives or stool softeners to help ease constipation. Also ask if you should skip any foods. Drinking lots of fluids and eating foods such as fruits and vegetables that are high in fiber can also help. Remember, do not take laxatives unless your surgeon has prescribed them.  · Drinking alcohol and taking pain medicine can cause dizziness and slow your breathing. It can even be deadly. Do not drink alcohol while taking pain medicine.  · Pain medicine can make you react more slowly to things. Do not drive or run machinery while taking pain medicine.  Your health care provider may tell you to take acetaminophen to help ease your pain. Ask him or her how much you are supposed to take each day. Acetaminophen or other pain relievers may interact with your prescription medicines or other over-the-counter (OTC) drugs. Some prescription medicines have acetaminophen and other ingredients. Using both prescription and OTC acetaminophen for pain can cause you to overdose. Read the labels on your OTC medicines with care. This will help you to clearly know the list of ingredients, how much to take, and any warnings. It may also help you not take too much acetaminophen. If you have questions or do not understand the information, ask your pharmacist or health care provider to explain it to you before you take the OTC medicine.  Managing nausea  Some people have an upset stomach after surgery. This is often because of anesthesia, pain, or pain medicine, or the stress of surgery. These tips will help you handle nausea and eat healthy foods as you get better. If you were on a special food plan before surgery, ask your doctor if you should follow it while you get better. These tips may help:  · Do not push yourself to eat. Your body will tell you when to eat and how much.  · Start off with clear  liquids and soup. They are easier to digest.  · Next try semi-solid foods, such as mashed potatoes, applesauce, and gelatin, as you feel ready.  · Slowly move to solid foods. Dont eat fatty, rich, or spicy foods at first.  · Do not force yourself to have 3 large meals a day. Instead eat smaller amounts more often.  · Take pain medicines with a small amount of solid food, such as crackers or toast, to avoid nausea.     Call your surgeon if  · You still have pain an hour after taking medicine. The medicine may not be strong enough.  · You feel too sleepy, dizzy, or groggy. The medicine may be too strong.  · You have side effects like nausea, vomiting, or skin changes, such as rash, itching, or hives.       If you have obstructive sleep apnea  You were given anesthesia medicine during surgery to keep you comfortable and free of pain. After surgery, you may have more apnea spells because of this medicine and other medicines you were given. The spells may last longer than usual.   At home:  · Keep using the continuous positive airway pressure (CPAP) device when you sleep. Unless your health care provider tells you not to, use it when you sleep, day or night. CPAP is a common device used to treat obstructive sleep apnea.  · Talk with your provider before taking any pain medicine, muscle relaxants, or sedatives. Your provider will tell you about the possible dangers of taking these medicines.  © 3042-7592 [employer]. [address] [place] [address]. All rights reserved. This information is not intended as a substitute for professional medical care. Always follow your healthcare professional's instructions.  Post op instructions for prevention of DVT  What is deep vein thrombosis?  Deep vein thrombosis (DVT) is the medical term for blood clots in the deep veins of the leg.  These blood clots can be dangerous.  A DVT can block a blood vessel and keep blood from getting where it needs to go.   Another problem is that the clot can travel to other parts of the body such as the lungs.  A clot that travels to the lungs is called a pulmonary embolus (PE) and can cause serious problems with breathing which can lead to death.  Am I at risk for DVT/PE?  If you are not very active, you are at risk of DVT.  Anyone confined to bed, sitting for long periods of time, recovering from surgery, etc. increases the risk of DVT.  Other risk factors are cancer diagnosis, certain medications, estrogen replacement in any form,older age, obesity, pregnancy, smoking, history of clotting disorders, and dehydration.  How will I know if I have a DVT?   Swelling in the lower leg   Pain   Warmth, redness, hardness or bulging of the vein  If you have any of these symptoms, call your doctors office right away.  Some people will not have any symptoms until the clot moves to the lungs.  What are the symptoms of a PE?   Panting, shortness of breath, or trouble breathing   Sharp, knife-like chest pain when you breathe   Coughing or coughing up blood   Rapid heartbeat  If you have any of these symptoms or get worse quickly, call 911 for emergency treatment.  How can I prevent a DVT?   Avoid long periods of inactivity and dont cross your legs--get up and walk around every hour or so.   Stay active--walking after surgery is highly encouraged.  This means you should get out of the house and walk in the neighborhood.  Going up and down stairs will not impair healing (unless advised against such activity by your doctor).     Drink plenty of noncaffeinated, nonalcoholic fluids each day to prevent dehydration.   Wear special support stockings, if they have been advised by your doctor.   If you travel, stop at least once an hour and walk around.   Avoid smoking (assistance with stopping is available through your healthcare provider)  Always notify your doctor if you are not able to follow the post operative instructions that are  given to you at the time of discharge.  It may be necessary to prescribe one of the medications available to prevent DVT.    We hope your stay was comfortable as you heal now, mend and rest.    For we have enjoyed taking care of you by giving your our best.    And as you get better, by regaining your health and strength;   We count it as a privilege to have served you and hope your time at [facility] was well spent.      Thank  You!!!   yes

## 2023-05-03 NOTE — ED ADULT NURSE NOTE - ED CARDIAC RHYTHM
Called and confirmed with pharmacy that they do have a refill on file for the patient. Called and informed patient that pharmacy has refill on file. She verbalized understanding and had no further questions.   
PT CALLING TO GET NEW RX FOR gabapentin (NEURONTIN) 600 MG tablet    TP OUT OF REFILLS - NEED 90 DAY RX, PLEASE    [facility] [address]     PT CALLBACK [phone]  
regular

## 2023-05-03 NOTE — ED PROVIDER NOTE - OBJECTIVE STATEMENT
Dr. Contreras: 88-year-old female brought in by EMS as a possible STEMI.  Patient with a history of LBBB, PE in the past, was on Xarelto but not anymore, depression, hyperlipidemia, had a left femoral neck fracture and January status post left hip hemiarthroplasty, had MRI in January medically managed, presenting with 2 days of intermittent chest pressure worse this morning.  Denies radiation of pain, but does complain of shortness of breath, nausea, lightheadedness, generalized weakness.  Pain is nonpleuritic.  Patient states this pain is similar to her prior MI.  As per daughter at bedside who is her healthcare proxy patient has had new slurred speech for the last 2 days.  Patient received 324 mg of aspirin by EMS on route.  As per daughter pt has been weaker over the past 2 weeks (generalized) and has not been ambulating. No trauma. 2 days ago started on ?abx for UTI. No fevers, chills, cough, dysuria, hematuria, frequency, diarrhea, constipation.

## 2023-05-03 NOTE — ED ADULT NURSE NOTE - OBJECTIVE STATEMENT
88 y.o F BIB EMS p/w c/o CP and SOB x2 days. A+Ox4. Pt daughter states 2 days ago pt woke up at 3am c/o chest pressure and SOB. Pt states CP and SOB has been intermittent for past 2 days. Endorsing new onset increased generalized weakness, speech changes and issues swallowing. L pronator drift noted. Pt being treated for UTI outpatient. Per EMS pt got 162 mg chewable asprin on way to [facility] ER. L leg swelling noted. Daughter at bedside.

## 2023-05-03 NOTE — ED PROVIDER NOTE - CONVERSATION DETAILS
Pt and daughter would like to fill out a MOLST with DNR DNI but agreeable to blood work and admission if indicated.  MOLST filled out.

## 2023-05-03 NOTE — ED ADULT NURSE NOTE - NSIMPLEMENTINTERV_GEN_ALL_ED
Implemented All Fall with Harm Risk Interventions:  [place] to call system. Call bell, personal items and telephone within reach. Instruct patient to call for assistance. Room bathroom lighting operational. Non-slip footwear when patient is off stretcher. Physically safe environment: no spills, clutter or unnecessary equipment. Stretcher in lowest position, wheels locked, appropriate side rails in place. Provide visual cue, wrist band, yellow gown, etc. Monitor gait and stability. Monitor for mental status changes and reorient to person, place, and time. Review medications for side effects contributing to fall risk. Reinforce activity limits and safety measures with patient and family. Provide visual clues: red socks.

## 2023-05-03 NOTE — H&P ADULT - PROBLEM SELECTOR PLAN 1
2/2 to pleural effusions iso HF exacerbation  -diuresis as bellow  -currently on 2L, wean as tolerated

## 2023-05-03 NOTE — CONSULT NOTE ADULT - ASSESSMENT
Interventional Radiology    Evaluate for Procedure:     HPI:  87 y/o F  with a h/o HFrEF, LBBB, PE previously on xarelto (no longer), depression, HLD, Femoral neck fracture in January s/p L hip arthroplasty presenting with acute hypoxemic respiratory failure iso acute on chronic HF. Also found to have worsening diffuse lymphadenopathy. IR c/s for lymph node biopsy.    Allergies: No Known Allergies    Medications (Abx/Cardiac/Anticoagulation/Blood Products)    furosemide   Injectable: 40 milliGRAM(s) IV Push (05-03 @ 13:24)    Data:    T(C): 36.6  HR: 94  BP: 124/862  RR: 22  SpO2: 99%    -WBC 11.05 / HgB 13.0 / Hct 41.6 / Plt 203  -Na 144 / Cl 106 / BUN 20 / Glucose 113  -K 3.3 / CO2 23 / Cr 0.54  -ALT 5 / Alk Phos 77 / T.Bili 0.7  -INR 1.21 / PTT 24.8      Radiology:     Assessment/Plan:   87 y/o F  with a h/o HFrEF, LBBB, PE previously on xarelto (no longer), depression, HLD, Femoral neck fracture in January s/p L hip arthroplasty presenting with acute hypoxemic respiratory failure iso acute on chronic HF. Also found to have worsening diffuse lymphadenopathy. IR c/s for lymph node biopsy.    -- After discussion with team, and based on H+P from 5/3/23, biopsy results would not  as patient is DNR/DNI and family would not pursue treatment if lymphoma/mets. As such, procedural risks outweigh benefits at this time and IR will defer consult. If patient's clinical status changes, please re-consult and can re-evaluate at that time.     Gabriel Chandler M.D.  PGY3/R2, Interventional Radiology    -Available on [employer] TEAMS for all non-urgent questions  -Emergent issues: [facility]-p.[phone]; [facility]-p.[phone] ([phone])  -Non-emergent consults: Please place a [place] order "Consult-Interventional Radiology" with an appropriate callback number  -Scheduling questions: [facility]: [phone]; [facility]: [phone]  -Clinic/Outpatient booking: [facility]: [phone]; [facility]: [phone]

## 2023-05-03 NOTE — ED PROVIDER NOTE - PROGRESS NOTE DETAILS
Patient endorsed to hospitalist Dr. Jones. Will d/w cards Dr. Anguiano regarding diuretics. - Jesus Garcia PA-C Discussed CT results with cardiologist Dr. Anguiano.  Will give 40 mg IV Lasix for pleural effusions and proBNP elevation.  He is aware patient is currently admitted to hospital. ED attending aware. - Jesus Garcia PA-C Patient and daughter made aware of CT results concerning for possible lymphoma versus metastatic process versus lymphoproliferative disorder. Discussed CT results with cardiologist Dr. Anguiano.  Will give 40 mg IV Lasix for pleural effusions and proBNP elevation.  He is aware patient is currently admitted to hospital. ED attending aware. - Jesus Garcia PA-C

## 2023-05-03 NOTE — H&P ADULT - PROBLEM SELECTOR PLAN 2
elevated proBNP compared to prior with pleural effusions and LE edema  -Cards following appreciate recs  -s/p IV lasix *1 in ED  -Continue IV diuresis as tolerated  -Tele  -I/O elevated proBNP compared to prior with pleural effusions and LE edema  -Cards following appreciate recs  -s/p IV lasix *1 in ED  -Continue IV diuresis as tolerated  -continue metop 25 xr daily  -continue entresto BID  -TTE  -Tele  -I/O elevated proBNP compared to prior with pleural effusions and LE edema  -Cards following appreciate recs  -s/p IV lasix *1 in ED  -Continue IV diuresis as tolerated  -continue metop 25 xr daily  -continue entresto BID  -has TTE <6 months ago, EF 35%  -Tele  -I/O

## 2023-05-03 NOTE — H&P ADULT - ASSESSMENT
87 y/o F  with a h/o HFrEF, LBBB, PE previously on xarelto (no longer), depression, HLD, Femoral neck fracture in January s/p L hip arthroplasty presenting with acute hypoxemic respiratory failure iso acute on chronic HF. Also found to have worsening diffuse lymphadenopathy.

## 2023-05-03 NOTE — ED ADULT NURSE NOTE - PAIN: PRESENCE, MLM
Patient: Mercedes Fitzpatrick Perez   Clinic #: [id number]   Diagnosis:   Encounter Diagnoses   Name Primary?   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 Pt reports nocturia x 2. And decreased frequency before going to bed.    Pain: Current: 0/10     Patient's Goals: decrease frequency    Objective     TREATMENT    Pt received individual therapeutic exercise for 30 minutes including:     - Pt education regarding self management/discharge including HEP and behavioral modification  - PFM activation with breathing (bearing down and squeeze/lift); verbal cueing provided    Not performed:  - 10 x 5'' kegals, good return demonstration, use of overflow  - 10 quick flicks; good decrease in use of overflow following verbal cueing  - Diaphragmatic breathing  - Supine clams 10 x 5''  - Supine ball squeeze 10 x 5''  - Supine piriformis stretch (stool) 3 x 5 breaths ea  - Supine adductor stretch with stool x 5 breaths  - Supine march with TA activation x 10 ea  - Sit to stand squat with core breath x 10  - Reviewed and discussed bladder diary; pt averages 15 voids/day and has decreased water, instructed to increase water, legs up before bed, no water by the bed  - Review of abdominal self care with corrections provided; pt with good return demonstration  - PFM activation with DB with SEMG assist in supine, external perianal electrodes; good return demonstration, slightly elevated baseline  - 10 x 5'' kegals in supine with SEMG assist; good WR rise, good holding, complete derecruitment to original baseline  - 10 quick flicks    Pt received individual neuromuscular  reeducation for 00 minutes including:     - Body scan mindfulness  - PFM activation with DB; pt displays improvement since eval with good coordination with core breath and complete derecruitment    Pt tolerated treatment well with no visible adverse affects. No skin irritation, errythemia, or other adverse affects observed from electrode placement.    Education: Discussed progression of plan of care with patient; educated pt in activity modification; pt instructed to continue with diaphragmatic breathing, legs in a chair, abdominal self care, PFM activation with DB with hips elevated, 5'' kegals, quick flicks, supine clams, and supine ball squeeze, sit to stand squat, piriformis stretch, and supine march; pt demonstrated and verbalized understanding.    Assessment     Pt tolerated session well without visible adverse effects. Pt verbalizes readiness for discharge today. She has made progress with improvement in symptoms. She continues to display PFM weakness and decreased core strength and stability. She is independent with her exercises at this time. She was instructed to return to therapy if things fail to continue to improve.    GOALS  Short Term Goals: 4 weeks (6/1/18)  1. Pt will verbalize improved awareness of PFM activity as palpated by PT in order to improve activity involvement with HEP. Met  2. Pt will demonstrate decreased overflow muscle activity during PF muscle contraction. Met with verbal cueing  3. Pt will be able to integrate inner core musculature in order to improve central stabilization and decrease urinary frequency. Continue   4. Pt will tolerate HEP to improve impairments and independence with ADL's. Met      Long Term Goals: 12 weeks (7/27/18)  1. Pt will be able to perform 10 x 5'' kegals in order to improve core strength and stability and minimize risk of prolapse progression. Met  2. Pt will complete bladder diary in order to complete bladder retraining as necessary. Met  3. Pt will report  improvement in urinary frequency. Partially met  4. Pt will be independent with HEP and self management    CMS Impairment/Limitation/Restriction for PFDI Prolapse Survey  Status Limitation G-Code CMS Severity Modifier  Intake 0% 0%  5/31/2018 4% 4%  8/27/2018 4% 4% Current Status CI - At least 1 percent but less than 20 percent     Plan     Discharge            complains of pain/discomfort

## 2023-05-03 NOTE — H&P ADULT - HISTORY OF PRESENT ILLNESS
CC: 89 y/o presenting with CP and sob for 2 days    History obtained via daughter due to patient preference    89 y/o F  with a h/o HFrEF, LBBB, PE previously on xarelto (no longer), depression, HLD, Femoral neck fracture in January s/p L hip arthroplasty presenting with worsening chest pain and shortness of breath. After hemiarthroplasty in January. patient never recovered full strength. Continued to be deconditioned which has progressed to the last 2 weeks where she is essentially bed bound. Poor po intake but denies weight loss/night sweats. Denies dark stool. Over the past few weeks, has noted that movement is limited 2/2 to sob, improves with rest. Denies changes in diet/recent sick contacts/changes in medication. Over the last 2 days noted new onset cp with minimal exertion (movement for changing). At this point presented to the ed.    In the ED afeb hds. sat 99 on 2L NC. Labs notable for proBNP 9K (previously 4K). EKG with unchanged LBBB. CT Chest/A/P with b/l pleural effusions with extensive thoracic and retroperitoneal lymphadenopathy increased since January CT. Cards consulted, received 40 IV lasix *1.

## 2023-05-03 NOTE — ED ADULT NURSE REASSESSMENT NOTE - NS ED NURSE REASSESS COMMENT FT1
Pt resting in bed comfortably, VSS, NAD. Pt and family updated on POC, waiting for tele bed. Pt changed into clean diaper.
Per Dr. Contreras pt ok to go off tele to [facility] at this time

## 2023-05-03 NOTE — ED PROVIDER NOTE - PHYSICAL EXAMINATION
Gen: No acute distress  HEENT: Mucous membranes moist, pink conjunctivae, EOMI  CV: RRR, +LLE edema, no calf ttp  Resp: CTAB  GI: Abdomen soft, NT, ND. Normal BS. No rebound, no guarding  : No CVAT  Neuro: A&O x 3, moving all 4 extremities, +flattening of left nasolabial fold, +left pronator drift, +mild slurred speech  MSK: No spine or joint tenderness to palpation  Skin: No rashes

## 2023-05-03 NOTE — H&P ADULT - PROBLEM SELECTOR PLAN 3
extensive thoracic and retroperitoneal Lymphadenopathy compared to CT in january, largest in R axila  -Etiology unclear possible metastatic disease vs inflammatory condition vs lymphoma  -discussed with daughter at length, patient DNR/DNI, would not undergoe chemo if lymphoma or mets but would like to know diagnosis  -Will consult IR for tissue sampling extensive thoracic and retroperitoneal Lymphadenopathy compared to CT in january, largest in R axila  -Etiology unclear possible metastatic disease vs inflammatory condition vs lymphoma  -discussed with daughter at length, patient DNR/DNI, would not undergo chemo if lymphoma or mets but would like to know diagnosis  -Will consult IR for tissue sampling extensive thoracic and retroperitoneal Lymphadenopathy compared to CT in January, largest in R axilla  -Etiology unclear possible metastatic disease vs inflammatory condition vs lymphoma  -discussed with daughter at length, patient DNR/DNI, would not undergo chemo if lymphoma or mets but would like to know diagnosis  -Will consult IR for tissue sampling

## 2023-05-03 NOTE — ED PROVIDER NOTE - CLINICAL SUMMARY MEDICAL DECISION MAKING FREE TEXT BOX
Pt p/w chest pain similar to prior MI, however EKG with LBBB unchanged from prior. Pt with also subacute presentation of left facial droop, left pronator drift and speech change. Given CP with neurological sx will r/o dissection. As pt is being treated for UTI will also r/o infectious causes.

## 2023-05-03 NOTE — ED PROVIDER NOTE - ATTENDING APP SHARED VISIT CONTRIBUTION OF CARE
Dr. Contreras: I performed a face to face bedside interview with patient regarding history of present illness, review of symptoms and past medical history. I completed an independent physical exam.  I have discussed patient's plan of care with PA.   I agree with note as stated above, having amended the EMR as needed to reflect my findings.   This includes HISTORY OF PRESENT ILLNESS, HIV, PAST MEDICAL/SURGICAL/FAMILY/SOCIAL HISTORY, ALLERGIES AND HOME MEDICATIONS, REVIEW OF SYSTEMS, PHYSICAL EXAM, and any PROGRESS NOTES during the time I functioned as the attending physician for this patient.    Dr. Contreras: This H&P has been written by myself in its entirety

## 2023-05-03 NOTE — ED ADULT NURSE NOTE - NS ED NOTE ABUSE SUSPICION NEGLECT YN
Ears: no ear pain and no hearing problems.Nose: no nasal congestion and no nasal drainage.Mouth/Throat: no dysphagia, no hoarseness and no throat pain.Neck: no lumps, no pain, no stiffness and no swollen glands. No

## 2023-05-03 NOTE — PATIENT PROFILE ADULT - PATIENT'S PREFERRED PRONOUN
> Tramadol does interact with the duloxetine.     Co-administration of Serotonin Norepi Reuptake Inhibitors( cymbalta)  with Tramadol may result in the development of serotonin syndrome (eg. agitation, altered consciousness, ataxia ( abnormal gait), myoclonus ( muscle tightening, clenching), overactive reflexes, shivering).       > Tramadol may also increase the tendency for seizures. Per my knowledge, she was being evaluated by a neurologist for the possibility of the same.    > I would leave it up to her and her primary care physician to consider this versus other medications like nonsteroidal agents for the pain.    Her/She

## 2023-05-04 NOTE — ADVANCED PRACTICE NURSE CONSULT - ASSESSMENT
Patient is  alert and oriented and gave consent to skin consult. . Assistance need to turn and position to view skin was provided RN ,Rita. patient is on a low air loss and pressure redistribution surface and is being turned and positioned as per nursing documentation. patient with poor skin turgor and ecchymotic areas noted on upper extremities.  bilateral  sacrum/buttocks non-blanchable deep red  discoloration and hyperpigmentation  size approximately  8cm x cm x unknown cm . with full-thickness skin and tissue loss with approximately 1% yellow slough open area approximately 1 cm x 1 cm x 0.5 cm . consistent  with unstageable pressure injury.  Patient is  alert and oriented and gave consent to skin consult. . Assistance need to turn and position to view skin was provided RN ,Rita.  patient  is on a low air loss and pressure redistribution surface and is being turned and positioned as per nursing documentation. patient with poor skin turgor and ecchymotic areas noted on upper extremities.  bilateral sacrum/buttocks non-blanchable deep red  discoloration and hyperpigmentation  size approximately  8cm x cm x unknown cm . and  full-thickness skin and tissue loss with approximately 1% yellow slough open area approximately 1 cm x 1 cm x 0.5 cm .  consistent with unstageable pressure injury.  Patient is  alert and oriented and gave consent to skin consult. . Assistance need to turn and position to view skin was provided RN ,Rita.  patient  is on a low air loss and pressure redistribution surface and is being turned and positioned as per nursing documentation. patient with poor skin turgor and ecchymotic areas noted on upper extremities.   Bilateral sacrum/ buttocks   non blanchable erythema deep red  maroon discoloration apomixically  8 cm x 8 cm x 0.1 cm  with partial -thickness skin loss with exposed dermis consistent with deep tissue injury evolution.   coccyx  with full-thickness skin and tissue loss with approximately 1% yellow slough open area approximately 1 cm x 1 cm x 0.5 cm . consistent   with unstageable  pressure injury.  present on admission .

## 2023-05-04 NOTE — PHYSICAL THERAPY INITIAL EVALUATION ADULT - TRANSFER TRAINING, PT EVAL
GOAL: Patient will perform sit to stand transfers with minAx1 with least restrictive assistive device with proper hand placement in 2 weeks.

## 2023-05-04 NOTE — PROGRESS NOTE ADULT - SUBJECTIVE AND OBJECTIVE BOX
Andre Reyes, M.D.  Pager: [phone]  Office: [phone]    Patient is a 88y old  Female who presents with a chief complaint of CP and SOB (04 May 2023 12:48)          SUBJECTIVE / OVERNIGHT EVENTS:    No acute overnight events.  breathing feeling better today    ROS: ( - ) Fever, ( - )Chills,  ( - )Nausea/Vomiting, ( - ) Cough, ( - )Shortness of breath, ( - )Chest Pain    MEDICATIONS  (STANDING):  aspirin  chewable 81 milliGRAM(s) Oral daily  chlorhexidine 2% Cloths 1 Application(s) Topical <User Schedule>  enoxaparin Injectable 30 milliGRAM(s) SubCutaneous every 24 hours  escitalopram 5 milliGRAM(s) Oral daily  furosemide   Injectable 40 milliGRAM(s) IV Push daily  metoprolol succinate ER 25 milliGRAM(s) Oral daily  sacubitril 24 mG/valsartan 26 mG 1 Tablet(s) Oral two times a day    MEDICATIONS  (PRN):  LORazepam     Tablet 1 milliGRAM(s) Oral at bedtime PRN for anxiety          T(C): 36.6 (05-04 @ 20:09), Max: 36.9 (05-03 @ 22:21)   HR: 94   BP: 113/75   RR: 18   SpO2: 94%    PHYSICAL EXAM:    CONSTITUTIONAL: NAD, well-developed, well-groomed  EYES: PERRLA; conjunctiva and sclera clear  ENMT: Moist oral mucosa, no pharyngeal injection or exudates ;left facial droop  NECK: Supple, no palpable masses; no thyromegaly  RESPIRATORY: Normal respiratory effort; lungs are clear to auscultation bilaterally  CARDIOVASCULAR: Regular rate and rhythm, normal S1 and S2, no murmur/rub/gallop; 1+ LLE Edema, trace RLE edema; Peripheral pulses are 2+ bilaterally  ABDOMEN: Nontender to palpation, normoactive bowel sounds, no rebound/guarding; No hepatosplenomegaly  MUSCULOSKELETAL:  no clubbing or cyanosis of digits; no joint swelling or tenderness to palpation  PSYCH: A+O to person, place, and time; affect appropriate  NEUROLOGY: CN 2-12 are intact and symmetric; no gross sensory deficits   SKIN: No rashes; no palpable lesions      LABS:                        12.6   9.55  )-----------( 173      ( 04 May 2023 06:35 )             41.4      05-04    146<H>  |  106  |  18  ----------------------------<  91  3.3<L>   |  24  |  0.59    Ca    8.4      04 May 2023 06:36  Mg     1.7     05-04    TPro  6.3  /  Alb  3.3  /  TBili  0.7  /  DBili  x   /  AST  19  /  ALT  5<L>  /  AlkPhos  77  05-03       CAPILLARY BLOOD GLUCOSE      POCT Blood Glucose.: 91 mg/dL (03 May 2023 22:21)      RADIOLOGY & ADDITIONAL TESTS:    Imaging Personally Reviewed:  Consultant(s) Notes Reviewed:    Care Discussed with Consultants/Other Providers:

## 2023-05-04 NOTE — PROGRESS NOTE ADULT - SUBJECTIVE AND OBJECTIVE BOX
DATE OF SERVICE: 05-04-23 @ 12:48    Patient is a 88y old  Female who presents with a chief complaint of CP and SOB (03 May 2023 17:10)      INTERVAL HISTORY: Feels ok.     REVIEW OF SYSTEMS:  CONSTITUTIONAL: No weakness  EYES/ENT: No visual changes;  No throat pain   NECK: No pain or stiffness  RESPIRATORY: No cough, wheezing; No shortness of breath  CARDIOVASCULAR: No chest pain or palpitations  GASTROINTESTINAL: No abdominal  pain. No nausea, vomiting, or hematemesis  GENITOURINARY: No dysuria, frequency or hematuria  NEUROLOGICAL: No stroke like symptoms  SKIN: No rashes     TELEMETRY Personally reviewed: SR 80-90 PVC, PAC, APT 160s 4.3 sec  	  MEDICATIONS:  furosemide   Injectable 40 milliGRAM(s) IV Push daily  metoprolol succinate ER 25 milliGRAM(s) Oral daily  sacubitril 24 mG/valsartan 26 mG 1 Tablet(s) Oral two times a day        PHYSICAL EXAM:  T(C): 36.6 (05-04-23 @ 11:06), Max: 37.1 (05-03-23 @ 13:20)  HR: 98 (05-04-23 @ 11:06) (75 - 98)  BP: 108/67 (05-04-23 @ 11:06) (99/67 - 133/69)  RR: 18 (05-04-23 @ 11:06) (18 - 22)  SpO2: 98% (05-04-23 @ 11:06) (96% - 100%)  Wt(kg): --  I&O's Summary    03 May 2023 07:01  -  04 May 2023 07:00  --------------------------------------------------------  IN: 0 mL / OUT: 500 mL / NET: -500 mL    04 May 2023 07:01  -  04 May 2023 12:48  --------------------------------------------------------  IN: 100 mL / OUT: 0 mL / NET: 100 mL      Height (cm): 172.7 (05-03 @ 19:59)  Weight (kg): 70.2 (05-03 @ 19:59)  BMI (kg/m2): 23.5 (05-03 @ 19:59)  BSA (m2): 1.83 (05-03 @ 19:59)    Appearance: In no distress	  HEENT:    PERRL, EOMI	  Cardiovascular:  S1 S2, No JVD  Respiratory: Lungs clear to auscultation	  Gastrointestinal:  Soft, Non-tender, + BS	  Vascularature:  + edema of LE  Psychiatric: Appropriate affect   Neuro: no acute focal deficits                               12.6   9.55  )-----------( 173      ( 04 May 2023 06:35 )             41.4     05-04    146<H>  |  106  |  18  ----------------------------<  91  3.3<L>   |  24  |  0.59    Ca    8.4      04 May 2023 06:36  Mg     1.7     05-04    TPro  6.3  /  Alb  3.3  /  TBili  0.7  /  DBili  x   /  AST  19  /  ALT  5<L>  /  AlkPhos  77  05-03        Labs personally reviewed      ASSESSMENT/PLAN: 	  89 y/o F  with a h/o HFrEF, LBBB, PE previously on xarelto (no longer), depression, HLD, Femoral neck fracture in January s/p L hip arthroplasty presenting with worsening chest pain and shortness of breath. After hemiarthroplasty in January. patient never recovered full strength. Continued to be deconditioned which has progressed to the last 2 weeks where she is essentially bed bound. Poor po intake but denies weight loss/night sweats. Denies dark stool. Over the past few weeks, has noted that movement is limited 2/2 to sob, improves with rest. Denies changes in diet/recent sick contacts/changes in medication. Over the last 2 days noted new onset cp with minimal exertion (movement for changing).       Problem/Plan - 1:  ·  Problem: Chronic Systolic HF  ·  Plan:   - [facility] hospitalization records obtained from November 24, 2022: Admitted there with chest pain, found to have as noted on CTA report "small volume PE in the left upper lobe segmental branches and ? RLL subsegmental branches"  - Trop noted to be 1597 but was never trended to peak, EKG there with LBBB consistent with MI. Found to have newly reduced EF last admission.   - BNP elevated ~9K  - CT Chest with small to moderate pleural effusions  - c/w ASA 81mg PO daily, Metoprolol and Entresto   - C/w Lasix 40mg IVP daily.   - Appreciate strict I&Os, daily weights  - Now saturating wnl on room air. Will cont to diurese with current lasix and closely assess daily.     Problem/Plan - 2:  ·  Problem: Chest Pain  ·  Plan: Patient reports progressive SOB and midsternal CP with minimal exertion for the past 2 weeks.   - Trop 51 --> 49 (lower than previous admission)  - EKG ST with LBBB (unchanged from baseline)  - CTA with no PE/Dissection but diffuse lymphadenopathy suggestive of malignancy  - c/w medical mgmt for presumed CAD with ASA, BB  - Can add Imdur if symptoms persist.  - Obtain formal TTE     Problem/Plan - 3:  ·  Problem: Pulmonary embolism.   ·  Plan:    - CTA with no PE noted  - LE US neg for DVT  - Can discontinue AC for now since there is no active indication  - c/w lovenox for DVT PPx    Problem/Plan - 4:  ·  Problem: Essential hypertension.   ·  Plan: BP overall acceptable   - c/w Metoprolol and Entresto  - c/w Lasix 40mg IVP daily          LINA Forbes-NP   Clyde Anguiano DO [facility]  Cardiovascular Medicine  [address] [place] [address], [address]  Available through call or text on [employer]  Office: [phone]   DATE OF SERVICE: 05-04-23 @ 12:48    Patient is a 88y old  Female who presents with a chief complaint of CP and SOB (03 May 2023 17:10)      INTERVAL HISTORY: Feels ok.     REVIEW OF SYSTEMS:  CONSTITUTIONAL: No weakness  EYES/ENT: No visual changes;  No throat pain   NECK: No pain or stiffness  RESPIRATORY: No cough, wheezing; No shortness of breath  CARDIOVASCULAR: No chest pain or palpitations  GASTROINTESTINAL: No abdominal  pain. No nausea, vomiting, or hematemesis  GENITOURINARY: No dysuria, frequency or hematuria  NEUROLOGICAL: No stroke like symptoms  SKIN: No rashes     TELEMETRY Personally reviewed: SR 80-90 PVC, PAC, APT 160s 4.3 sec  	  MEDICATIONS:  furosemide   Injectable 40 milliGRAM(s) IV Push daily  metoprolol succinate ER 25 milliGRAM(s) Oral daily  sacubitril 24 mG/valsartan 26 mG 1 Tablet(s) Oral two times a day        PHYSICAL EXAM:  T(C): 36.6 (05-04-23 @ 11:06), Max: 37.1 (05-03-23 @ 13:20)  HR: 98 (05-04-23 @ 11:06) (75 - 98)  BP: 108/67 (05-04-23 @ 11:06) (99/67 - 133/69)  RR: 18 (05-04-23 @ 11:06) (18 - 22)  SpO2: 98% (05-04-23 @ 11:06) (96% - 100%)  Wt(kg): --  I&O's Summary    03 May 2023 07:01  -  04 May 2023 07:00  --------------------------------------------------------  IN: 0 mL / OUT: 500 mL / NET: -500 mL    04 May 2023 07:01  -  04 May 2023 12:48  --------------------------------------------------------  IN: 100 mL / OUT: 0 mL / NET: 100 mL      Height (cm): 172.7 (05-03 @ 19:59)  Weight (kg): 70.2 (05-03 @ 19:59)  BMI (kg/m2): 23.5 (05-03 @ 19:59)  BSA (m2): 1.83 (05-03 @ 19:59)    Appearance: In no distress	  HEENT:    PERRL, EOMI	  Cardiovascular:  S1 S2, No JVD  Respiratory: Lungs clear to auscultation	  Gastrointestinal:  Soft, Non-tender, + BS	  Vascularature:  + edema of LE  Psychiatric: Appropriate affect   Neuro: no acute focal deficits                               12.6   9.55  )-----------( 173      ( 04 May 2023 06:35 )             41.4     05-04    146<H>  |  106  |  18  ----------------------------<  91  3.3<L>   |  24  |  0.59    Ca    8.4      04 May 2023 06:36  Mg     1.7     05-04    TPro  6.3  /  Alb  3.3  /  TBili  0.7  /  DBili  x   /  AST  19  /  ALT  5<L>  /  AlkPhos  77  05-03        Labs personally reviewed      ASSESSMENT/PLAN: 	  87 y/o F  with a h/o HFrEF, LBBB, PE previously on xarelto (no longer), depression, HLD, Femoral neck fracture in January s/p L hip arthroplasty presenting with worsening chest pain and shortness of breath. After hemiarthroplasty in January. patient never recovered full strength. Continued to be deconditioned which has progressed to the last 2 weeks where she is essentially bed bound. Poor po intake but denies weight loss/night sweats. Denies dark stool. Over the past few weeks, has noted that movement is limited 2/2 to sob, improves with rest. Denies changes in diet/recent sick contacts/changes in medication. Over the last 2 days noted new onset cp with minimal exertion (movement for changing).       Problem/Plan - 1:  ·  Problem: Chronic Systolic HF  ·  Plan:   - [facility] hospitalization records obtained from November 24, 2022: Admitted there with chest pain, found to have as noted on CTA report "small volume PE in the left upper lobe segmental branches and ? RLL subsegmental branches"  - Trop noted to be 1597 but was never trended to peak, EKG there with LBBB consistent with MI. Found to have newly reduced EF last admission.   - BNP elevated ~9K  - CT Chest with small to moderate pleural effusions  - c/w ASA 81mg PO daily, Metoprolol and Entresto   - C/w Lasix 40mg IVP daily.   - Appreciate strict I&Os, daily weights  - Now saturating wnl on room air. Will cont to diurese with current lasix and closely assess daily.     Problem/Plan - 2:  ·  Problem: Chest Pain  ·  Plan: Patient reports progressive SOB and midsternal CP with minimal exertion for the past 2 weeks.   - Trop 51 --> 49 (lower than previous admission)  - EKG ST with LBBB (unchanged from baseline)  - CTA with no PE/Dissection but diffuse lymphadenopathy suggestive of malignancy  - c/w medical mgmt for presumed CAD with ASA, BB  - Can add Imdur if symptoms persist.  - Obtain formal TTE     Problem/Plan - 3:  ·  Problem: Pulmonary embolism.   ·  Plan:    - CTA with no PE noted  - LE US neg for DVT  - Can discontinue AC for now since there is no active indication  - c/w lovenox for DVT PPx    Problem/Plan - 4:  ·  Problem: Essential hypertension.   ·  Plan: BP overall acceptable   - c/w Metoprolol and Entresto  - c/w Lasix 40mg IVP daily          LINA Forbes-NP   Clyde Anguiano DO [facility]  Cardiovascular Medicine  [address] [place] [address], [address]  Available through call or text on [employer]  Office: [phone]

## 2023-05-04 NOTE — ADVANCED PRACTICE NURSE CONSULT - RECOMMEDATIONS
Impression   Bilateral  sacrum/buttocks unstageable pressure injury      Impression   Bilateral  sacrum/buttocks unstageable pressure injury      Recommendations      Will recommend:  1. Topical therapy- sacral/bilateral buttocks- cleanse w/incontinent cleanser, pat dry & apply Triad paste twice daily & prn soiling episodes; With episodes of incontinence only remove soiled layer of Triad, then reinforce with thin layer.  2 Incontinent management - incontinent cleanser, pads, pericare BID  3. Maintain on an alternating air with low air loss surface   4 Turn & reposition every 2 hr; Use positioning pillow to turn and reposition, soft pillow between bony prominences; continue measures to decrease friction/shear/pressure  5. Elevate heels; apply Complete Cair air fluidized boots; ensure that the soles of the feet are not resting on the foot board of the bed  6. Nutrition optimization.  Discussed treatment plan w/staff RNRita  at bedside.     Impression    bilateral sacrum/buttocks deep tissue injury with evolution    Coccyx  unstageable pressure injury present on admission      Recommendations      Will recommend:  1.bilateral sacrum / buttocks deep tissue injury and coccyx unstageable  Topical therapy-   cleanse w/incontinent cleanser, pat dry & apply Triad paste twice daily & prn soiling episodes; With episodes of incontinence only remove soiled layer of Triad, then reinforce with thin layer. monitor for changes   2 Incontinent management - incontinent cleanser, pads, pericare BID  3. Maintain on an alternating air with low air loss surface   4 Turn & reposition every 2 hr; Use positioning pillow to turn and reposition, soft pillow between bony prominences; continue measures to decrease friction/shear/pressure  5. Elevate heels; apply Complete Cair air fluidized boots; ensure that the soles of the feet are not resting on the foot board of the bed  6. Nutrition optimization.  Discussed treatment plan w/staff RNRita  at bedside.     Impression    bilateral sacrum/buttocks deep tissue injury with evolution  present on admission   Coccyx  unstageable pressure injury present on admission      Recommendations      Will recommend:  1.bilateral sacrum / buttocks deep tissue injury and coccyx unstageable  Topical therapy-   cleanse w/incontinent cleanser, pat dry & apply Triad paste twice daily & prn soiling episodes; With episodes of incontinence only remove soiled layer of Triad, then reinforce with thin layer. monitor for changes   2 Incontinent management - incontinent cleanser, pads, pericare BID  3. Maintain on an alternating air with low air loss surface   4 Turn & reposition every 2 hr; Use positioning pillow to turn and reposition, soft pillow between bony prominences; continue measures to decrease friction/shear/pressure  5. Elevate heels; apply Complete Cair air fluidized boots; ensure that the soles of the feet are not resting on the foot board of the bed  6. Nutrition optimization.  Discussed treatment plan w/staff RNRita  at bedside.

## 2023-05-04 NOTE — PHYSICAL THERAPY INITIAL EVALUATION ADULT - PERTINENT HX OF CURRENT PROBLEM, REHAB EVAL
87 y/o F  with a h/o HFrEF, LBBB, PE previously on xarelto (no longer), depression, HLD, Femoral neck fracture in January s/p L hip arthroplasty presenting with worsening chest pain and shortness of breath. After hemiarthroplasty in January. patient never recovered full strength. Continued to be deconditioned which has progressed to the last 2 weeks where she is essentially bed bound. Poor po intake but denies weight loss/night sweats. Denies dark stool. Over the past few weeks, has noted that movement is limited 2/2 to sob, improves with rest. Denies changes in diet/recent sick contacts/changes in medication. Over the last 2 days noted new onset cp with minimal exertion (movement for changing). At this point presented to the ed. 87 y/o F  with a h/o HFrEF, LBBB, PE previously on xarelto (no longer), depression, HLD, Femoral neck fracture in January s/p L hip arthroplasty presenting with worsening chest pain and shortness of breath. After hemiarthroplasty in January. patient never recovered full strength. Continued to be deconditioned which has progressed to the last 2 weeks where she is essentially bed bound. Poor po intake but denies weight loss/night sweats. Denies dark stool. Over the past few weeks, has noted that movement is limited 2/2 to sob, improves with rest. Denies changes in diet/recent sick contacts/changes in medication. Over the last 2 days noted new onset cp with minimal exertion (movement for changing). At this point presented to the ed.  CT Head: Stable exam. If symptoms continue MRI of the brain can be done for further evaluation if there are no contraindications. CT Angio A/P/Aorta: No intramural hematoma or aortic dissection. Extensive thoracic and retroperitoneal lymphadenopathy, some of which is hyperdense in nature. The largest lymph node is seen in the right axilla measuring 4 x 2.6 cm. These have markedly increased since 1/11/2023 examination. Differential includes lymphoma, metastatic disease from hyperdense tumor, or densities such as Castleman's disease. Small-to-moderate bilateral pleural effusions with right middle and lower lobe atelectasis and partial left lower lobe atelectasis. 89 y/o F  with a h/o HFrEF, LBBB, PE previously on xarelto (no longer), depression, HLD, Femoral neck fracture in January s/p L hip arthroplasty presenting with worsening chest pain and shortness of breath. After hemiarthroplasty in January. patient never recovered full strength. Continued to be deconditioned which has progressed to the last 2 weeks where she is essentially bed bound. Poor po intake but denies weight loss/night sweats. Over the past few weeks, has noted that movement is limited 2/2 to sob, improves with rest. Over the last 2 days noted new onset cp with minimal exertion (movement for changing). At this point presented to the ed.  CT Head: Stable exam. If symptoms continue MRI of the brain can be done for further evaluation if there are no contraindications. CT Angio A/P/Aorta: No intramural hematoma or aortic dissection. Extensive thoracic and retroperitoneal lymphadenopathy, some of which is hyperdense in nature. The largest lymph node is seen in the right axilla measuring 4 x 2.6 cm. These have markedly increased since 1/11/2023 examination. Differential includes lymphoma, metastatic disease from hyperdense tumor, or densities such as Castleman's disease. Small-to-moderate bilateral pleural effusions with right middle and lower lobe atelectasis and partial left lower lobe atelectasis. 89 y/o F  with a h/o HFrEF, LBBB, PE previously on xarelto (no longer), depression, HLD, Femoral neck fracture in January s/p L hip arthroplasty presenting with worsening chest pain and shortness of breath. After hemiarthroplasty in January. patient never recovered full strength. Continued to be deconditioned which has progressed to the last 2 weeks where she is essentially bed bound. Poor po intake but denies weight loss/night sweats. Over the past few weeks, has noted that movement is limited 2/2 to sob, improves with rest. Over the last 2 days noted new onset cp with minimal exertion (movement for changing). At this point presented to the ed.  5/3 CT Head: Stable exam. If symptoms continue MRI of the brain can be done for further evaluation if there are no contraindications. 5/3 CT Angio Chest/Aorta/Abd/Pelvis: No intramural hematoma or aortic dissection. Extensive thoracic and retroperitoneal lymphadenopathy, some of which is hyperdense in nature. The largest lymph node is seen in the right axilla measuring 4 x 2.6 cm. These have markedly increased since 1/11/2023 examination. Small-to-moderate bilateral pleural effusions with right middle and lower lobe atelectasis and partial left lower lobe atelectasis. 5/3 VA Duplex LE Vein Scan, bilateral:   No evidence of left lower extremity deep venous thrombosis. Multiple enlarged, pathologic appearing, masslike right and left groin lymph nodes, also visualized on recent CT, likely neoplastic. Superficial soft tissue edema noted of the left lower extremity.

## 2023-05-04 NOTE — PHYSICAL THERAPY INITIAL EVALUATION ADULT - NSPTDISCHREC_GEN_A_CORE
If pt d/c home would require home PT, assist with all mobility/ADLs, & DME: 3:1 commode 2/2 pt confined to single room without toilet/Sub-acute Rehab

## 2023-05-04 NOTE — PHYSICAL THERAPY INITIAL EVALUATION ADULT - ADDITIONAL COMMENTS
Pt states she lives in a private house with her daughter with +3STE and 2 steps inside. Pt states she sleeps on the couch in the living room and her daughter assists her with ADLs and functional mobility. Pt states she ambulates with a rolling walker and also owns a w/c.

## 2023-05-04 NOTE — ADVANCED PRACTICE NURSE CONSULT - REASON FOR CONSULT
Consult to assess patient skin initiated by RN Pressure Injury suspected deep tissue injury sacrum   History of Present Illness:  Reason for Admission: CP and SOB  History of Present Illness:   CC: 89 y/o presenting with CP and sob for 2 days    History obtained via daughter due to patient preference    89 y/o F  with a h/o HFrEF, LBBB, PE previously on xarelto (no longer), depression, HLD, Femoral neck fracture in January s/p L hip arthroplasty presenting with worsening chest pain and shortness of breath. After hemiarthroplasty in January. patient never recovered full strength. Continued to be deconditioned which has progressed to the last 2 weeks where she is essentially bed bound. Poor po intake but denies weight loss/night sweats. Denies dark stool. Over the past few weeks, has noted that movement is limited 2/2 to sob, improves with rest. Denies changes in diet/recent sick contacts/changes in medication. Over the last 2 days noted new onset cp with minimal exertion (movement for changing). At this point presented to the ed.    In the ED afeb hds. sat 99 on 2L NC. Labs notable for proBNP 9K (previously 4K). EKG with unchanged LBBB. CT Chest/A/P with b/l pleural effusions with extensive thoracic and retroperitoneal lymphadenopathy increased since January CT. Cards consulted, received 40 IV lasix *1.

## 2023-05-05 NOTE — DIETITIAN INITIAL EVALUATION ADULT - OTHER INFO
-- Lab 5/5 Na 145, K 3.5, Cl 104, BUN 21 , Cr 0.65, GFR 85.  -- Pt is on Lasix, Lexapro, Ativan, Metoprolol, Entresto

## 2023-05-05 NOTE — DIETITIAN INITIAL EVALUATION ADULT - PERTINENT MEDS FT
MEDICATIONS  (STANDING):  aspirin  chewable 81 milliGRAM(s) Oral daily  chlorhexidine 2% Cloths 1 Application(s) Topical <User Schedule>  enoxaparin Injectable 30 milliGRAM(s) SubCutaneous every 24 hours  escitalopram 5 milliGRAM(s) Oral daily  furosemide   Injectable 40 milliGRAM(s) IV Push daily  metoprolol succinate ER 25 milliGRAM(s) Oral daily  sacubitril 24 mG/valsartan 26 mG 1 Tablet(s) Oral two times a day    MEDICATIONS  (PRN):  LORazepam     Tablet 1 milliGRAM(s) Oral at bedtime PRN for anxiety

## 2023-05-05 NOTE — DIETITIAN INITIAL EVALUATION ADULT - ADD RECOMMEND
1. Recommend liberalize diet to regular soft/bite size as tolerated. RD remains available to adjust diet as needed.   2. Recommend Ensure plus high protein 240ml 2x daily (700kcal, 40g proteins) to optimize calories and nutrients intake.   3. Recommend multivitamins/minerals and Vitamin C, pending no medical contraindications, for micronutrient support.   4. Monitor PO intake, GI tolerance, skin integrity, labs, weight, and bowel movement regularity.   5. Will continue to honor food and beverage preferences in an effort to maximize level of nutrient intake.  6. Assist with meals PRN and encourage PO intake.  7. Malnutrition alert placed in chart.

## 2023-05-05 NOTE — DIETITIAN INITIAL EVALUATION ADULT - PERTINENT LABORATORY DATA
05-05    145  |  104  |  21  ----------------------------<  76  3.5   |  25  |  0.65    Ca    8.6      05 May 2023 07:10  Mg     1.7     05-04

## 2023-05-05 NOTE — DIETITIAN INITIAL EVALUATION ADULT - PROBLEM SELECTOR PLAN 2
elevated proBNP compared to prior with pleural effusions and LE edema  -Cards following appreciate recs  -s/p IV lasix *1 in ED  -Continue IV diuresis as tolerated  -continue metop 25 xr daily  -continue entresto BID  -has TTE <6 months ago, EF 35%  -Tele  -I/O

## 2023-05-05 NOTE — CONSULT NOTE ADULT - ATTENDING COMMENTS
incidentally found to have right axillary node and diffuse retroperitoneal nodes  unclear etiols  will have a core biopsy for her right axillary node

## 2023-05-05 NOTE — CONSULT NOTE ADULT - SUBJECTIVE AND OBJECTIVE BOX
Vascular & Interventional Radiology    HPI: 88y Female with _______    Allergies: No Known Allergies    Medications (Abx/Cardiac/Anticoagulation/Blood Products)    Data:  T(C): 36.5  HR: 88  BP: 120/81  RR: 18  SpO2: 97%    -WBC 8.22 / HgB 11.8 / Hct 38.1 / Plt 180  -Na 145 / Cl 104 / BUN 21 / Glucose 76  -K 3.5 / CO2 25 / Cr 0.65  -ALT -- / Alk Phos -- / T.Bili --  -INR1.21    Imaging: _______    Assessment:   88y Female with_______    Plan:  Vascular & Interventional Radiology    HPI: 88y Female with h/o HFrEF, LBBB, PE previously on xarelto (no longer), depression, HLD, Femoral neck fracture in January s/p L hip arthroplasty presenting with worsening chest pain and shortness of breath. After hemiarthroplasty in January. patient never recovered full strength. Continued to be deconditioned which has progressed to the last 2 weeks PTA where she is essentially bed bound. Poor po intake but denies weight loss/night sweats. Denies dark stool. Over the past few weeks PTA, has noted that movement is limited 2/2 to sob, improves with rest. Denies changes in diet/recent sick contacts/changes in medication. Over the last 2 days PTA noted new onset cp with minimal exertion (movement for changing). In the ED afeb hds. sat 99 on 2L NC. Labs notable for proBNP 9K (previously 4K). EKG with unchanged LBBB.     CT Chest/A/P with b/l pleural effusions with extensive thoracic and retroperitoneal lymphadenopathy increased since January CT.     Allergies: No Known Allergies    Data:  T(C): 36.5  HR: 88  BP: 120/81  RR: 18  SpO2: 97%    -WBC 8.22 / HgB 11.8 / Hct 38.1 / Plt 180  -Na 145 / Cl 104 / BUN 21 / Glucose 76  -K 3.5 / CO2 25 / Cr 0.65  -INR1.21      Assessment:   88y Female w/ FTT found to have worsening lymphadenopathy.    Plan:   - Will plan for core needle biopsy of either a R axillary or L inguinal lymph node.  - Either Monday or Tuesday, pending schedule availability.  - Local only, does not have to be NPO.   - Okay to continue ASA. Hold AM DVT ppx day of procedure.  - Place IR procedure order under Dr. Larsen.      --  Geoff Dior MD, PGY-6  Vascular and Interventional Radiology  Available on [employer] Teams    - Nonemergent consults:  place sunrise order "Consult- Interventional Radiology"  - Emergent issues (pager): [facility] [phone]; [facility] [phone]; [phone]  - Scheduling questions: [facility] [phone]; [facility] [phone]  - Clinic/outpatient booking: [facility] [phone]; [facility] [phone]

## 2023-05-05 NOTE — PROGRESS NOTE ADULT - SUBJECTIVE AND OBJECTIVE BOX
DATE OF SERVICE: 05-05-23 @ 16:30    Patient is a 88y old  Female who presents with a chief complaint of Chest pain    Per chart: "89 y/o F  with a h/o HFrEF, LBBB, PE previously on xarelto (no longer), depression, HLD, Femoral neck fracture in January s/p L hip arthroplasty presenting with worsening chest pain and shortness of breath. After hemiarthroplasty in January. patient never recovered full strength. Continued to be deconditioned which has progressed to the last 2 weeks where she is essentially bed bound. Poor po intake but denies weight loss/night sweats. Denies dark stool. Over the past few weeks, has noted that movement is limited 2/2 to sob, improves with rest. Denies changes in diet/recent sick contacts/changes in medication. Over the last 2 days noted new onset cp with minimal exertion (movement for changing). At this point presented to the ed.    In the ED afeb hds. sat 99 on 2L NC. Labs notable for proBNP 9K (previously 4K). EKG with unchanged LBBB. CT Chest/A/P with b/l pleural effusions with extensive thoracic and retroperitoneal lymphadenopathy increased since January CT. Cards consulted, received 40 IV lasix *1. " (05 May 2023 12:27)      INTERVAL HISTORY: Feels ok. Sleeping.     REVIEW OF SYSTEMS:  CONSTITUTIONAL: No weakness  EYES/ENT: No visual changes;  No throat pain   NECK: No pain or stiffness  RESPIRATORY: No cough, wheezing; No shortness of breath  CARDIOVASCULAR: No chest pain or palpitations  GASTROINTESTINAL: No abdominal  pain. No nausea, vomiting, or hematemesis  GENITOURINARY: No dysuria, frequency or hematuria  NEUROLOGICAL: No stroke like symptoms  SKIN: No rashes    TELEMETRY Personally reviewed: SR   	  MEDICATIONS:  metoprolol succinate ER 25 milliGRAM(s) Oral daily  sacubitril 24 mG/valsartan 26 mG 1 Tablet(s) Oral two times a day        PHYSICAL EXAM:  T(C): 36.5 (05-05-23 @ 11:04), Max: 36.6 (05-04-23 @ 20:09)  HR: 92 (05-05-23 @ 11:04) (92 - 99)  BP: 117/72 (05-05-23 @ 11:04) (112/76 - 133/83)  RR: 18 (05-05-23 @ 11:04) (18 - 18)  SpO2: 97% (05-05-23 @ 11:04) (92% - 98%)  Wt(kg): --  I&O's Summary    04 May 2023 07:01  -  05 May 2023 07:00  --------------------------------------------------------  IN: 220 mL / OUT: 100 mL / NET: 120 mL    05 May 2023 07:01  -  05 May 2023 16:30  --------------------------------------------------------  IN: 240 mL / OUT: 400 mL / NET: -160 mL          Appearance: In no distress	  HEENT:    PERRL, EOMI	  Cardiovascular:  S1 S2, No JVD  Respiratory: Lungs clear to auscultation	  Gastrointestinal:  Soft, Non-tender, + BS	  Vascularature:  No edema of LE  Psychiatric: Appropriate affect   Neuro: no acute focal deficits                               11.8   8.22  )-----------( 180      ( 05 May 2023 07:08 )             38.1     05-05    145  |  104  |  21  ----------------------------<  76  3.5   |  25  |  0.65    Ca    8.6      05 May 2023 07:10  Mg     1.7     05-04          Labs personally reviewed      ASSESSMENT/PLAN: 	  89 y/o F  with a h/o HFrEF, LBBB, PE previously on xarelto (no longer), depression, HLD, Femoral neck fracture in January s/p L hip arthroplasty presenting with worsening chest pain and shortness of breath. After hemiarthroplasty in January. patient never recovered full strength. Continued to be deconditioned which has progressed to the last 2 weeks where she is essentially bed bound. Poor po intake but denies weight loss/night sweats. Denies dark stool. Over the past few weeks, has noted that movement is limited 2/2 to sob, improves with rest. Denies changes in diet/recent sick contacts/changes in medication. Over the last 2 days noted new onset cp with minimal exertion (movement for changing).       Problem/Plan - 1:  ·  Problem: Chronic Systolic HF  ·  Plan:   - [facility] hospitalization records obtained from November 24, 2022: Admitted there with chest pain, found to have as noted on CTA report "small volume PE in the left upper lobe segmental branches and ? RLL subsegmental branches"  - Trop noted to be 1597 but was never trended to peak, EKG there with LBBB consistent with MI. Found to have newly reduced EF last admission.   - BNP elevated ~9K  - CT Chest with small to moderate pleural effusions  - c/w ASA 81mg PO daily, Metoprolol and Entresto   - Appreciate strict I&Os, daily weights  - Now saturating wnl on room air.   - Agree with transition to Lasix 40mg PO daily.     Problem/Plan - 2:  ·  Problem: Chest Pain  ·  Plan: Patient reports progressive SOB and midsternal CP with minimal exertion for the past 2 weeks.   - Trop 51 --> 49 (lower than previous admission)  - EKG ST with LBBB (unchanged from baseline)  - CTA with no PE/Dissection but diffuse lymphadenopathy suggestive of malignancy  - c/w medical mgmt for presumed CAD with ASA, BB    Problem/Plan - 3:  ·  Problem: Pulmonary embolism.   ·  Plan:    - CTA with no PE noted  - LE US neg for DVT  - Can discontinue AC for now since there is no active indication  - c/w lovenox for DVT PPx    Problem/Plan - 4:  ·  Problem: Essential hypertension.   ·  Plan: BP overall acceptable   - c/w Metoprolol and Entresto  - c/w Lasix 40mg PO daily            Raysa Vásquez, LINA-NP   Clyde Anguiano DO [facility]  Cardiovascular Medicine  [address], [address]  Available through call or text on [employer] TEAMs  Office: [phone]

## 2023-05-05 NOTE — CONSULT NOTE ADULT - ASSESSMENT
88F hx HFrEF, LBBB, PE previously on xarelto (no longer), depression, HLD, femoral neck fracture in January s/p L hip arthroplasty presenting with worsening chest pain and shortness of breath. Oncology consulted for lymphadenopathy concerning for malignancy.    #Lymphadenopathy  - CTA abd/pelvis 5/3/23: Extensive thoracic and retroperitoneal lymphadenopathy, some of which is hyperdense in nature. The largest lymph node is seen in the right axilla measuring 4 x 2.6 cm. These have markedly increased since 1/11/2023 examination. Differential includes lymphoma, metastatic disease from hyperdense tumor, or densities such as Castleman's disease. 88F hx HFrEF, LBBB, PE previously on xarelto (no longer), depression, HLD, femoral neck fracture in January s/p L hip arthroplasty presenting with worsening chest pain and shortness of breath. Oncology consulted for lymphadenopathy concerning for malignancy.    #Lymphadenopathy  Patient had chest and axillary lymphadenopathy noted on CTA chest (ordered for dyspnea, found PE) in January 2023. She was treated for the PE with Xarelto but now presenting with chest pain and dyspnea.  - CTA abd/pelvis 5/3/23: Extensive thoracic and retroperitoneal lymphadenopathy, some of which is hyperdense in nature. The largest lymph node is seen in the right axilla measuring 4 x 2.6 cm. These have markedly increased since 1/11/2023 examination. Differential includes lymphoma, metastatic disease from hyperdense tumor, or densities such as Castleman's disease. Small-to-moderate bilateral pleural effusions with right middle and lower lobe atelectasis and partial left lower lobe atelectasis.  - Lung primary seems most likely but the differential is too broad. Ddx includes lymphoma as well.  - Patient and her daughter note that they would not want standard chemotherapy but would be amenable to discussing targeted therapies. However, the treatment options would depend on the diagnosis.   - Patient is DNR/DNI; would not want to be intubated even for a bronchoscopy  - She needs a biopsy of one of the lymph nodes, prefer core biopsy if feasible, whichever node is most accessible.    Note is not finalized until signed by attending.     Dillan Jain MD  Hematology/Oncology Fellow PGY-4  Pager: [facility] [phone] / [place] [phone]  After 5pm and on weekends please page on-call fellow    88F hx HFrEF, LBBB, PE previously on xarelto (no longer), depression, HLD, femoral neck fracture in January s/p L hip arthroplasty presenting with worsening chest pain and shortness of breath. Oncology consulted for lymphadenopathy concerning for malignancy.    #Lymphadenopathy  Patient had chest and axillary lymphadenopathy noted on CTA chest (ordered for dyspnea, found PE) in January 2023. She was treated for the PE with Xarelto but now presenting with chest pain and dyspnea.  - CTA abd/pelvis 5/3/23: Extensive thoracic and retroperitoneal lymphadenopathy, some of which is hyperdense in nature. The largest lymph node is seen in the right axilla measuring 4 x 2.6 cm. These have markedly increased since 1/11/2023 examination. Differential includes lymphoma, metastatic disease from hyperdense tumor, or densities such as Castleman's disease. Small-to-moderate bilateral pleural effusions with right middle and lower lobe atelectasis and partial left lower lobe atelectasis.  - Lung primary seems most likely but the differential is too broad. Ddx includes lymphoma as well.  - Patient and her daughter note that they would not want standard chemotherapy but would be amenable to discussing targeted therapies. However, the treatment options would depend on the diagnosis.   - Patient is DNR/DNI; would not want to be intubated even for a bronchoscopy  - She needs a biopsy of one of the lymph nodes, prefer core biopsies if feasible, whichever node is most accessible. Would suggest the large right axillary lymph node.    Note is not finalized until signed by attending.     Dillan Jain MD  Hematology/Oncology Fellow PGY-4  Pager: [facility] [phone] / [place] [phone]  After 5pm and on weekends please page on-call fellow

## 2023-05-05 NOTE — DIETITIAN INITIAL EVALUATION ADULT - PROBLEM SELECTOR PLAN 3
extensive thoracic and retroperitoneal Lymphadenopathy compared to CT in January, largest in R axilla  -Etiology unclear possible metastatic disease vs inflammatory condition vs lymphoma  -discussed with daughter at length, patient DNR/DNI, would not undergo chemo if lymphoma or mets but would like to know diagnosis  -Will consult IR for tissue sampling

## 2023-05-05 NOTE — DIETITIAN INITIAL EVALUATION ADULT - REASON FOR ADMISSION
Chest pain    Per chart: "89 y/o F  with a h/o HFrEF, LBBB, PE previously on xarelto (no longer), depression, HLD, Femoral neck fracture in January s/p L hip arthroplasty presenting with worsening chest pain and shortness of breath. After hemiarthroplasty in January. patient never recovered full strength. Continued to be deconditioned which has progressed to the last 2 weeks where she is essentially bed bound. Poor po intake but denies weight loss/night sweats. Denies dark stool. Over the past few weeks, has noted that movement is limited 2/2 to sob, improves with rest. Denies changes in diet/recent sick contacts/changes in medication. Over the last 2 days noted new onset cp with minimal exertion (movement for changing). At this point presented to the ed.    In the ED afeb hds. sat 99 on 2L NC. Labs notable for proBNP 9K (previously 4K). EKG with unchanged LBBB. CT Chest/A/P with b/l pleural effusions with extensive thoracic and retroperitoneal lymphadenopathy increased since January CT. Cards consulted, received 40 IV lasix *1. "

## 2023-05-05 NOTE — PROGRESS NOTE ADULT - SUBJECTIVE AND OBJECTIVE BOX
Andre Reyes, M.D.  Pager: [phone]  Office: [phone]    Patient is a 88y old  Female who presents with a chief complaint of CP and SOB (04 May 2023 12:48)          SUBJECTIVE / OVERNIGHT EVENTS:    No acute overnight events.    ROS: ( - ) Fever, ( - )Chills,  ( - )Nausea/Vomiting, ( - ) Cough, ( - )Shortness of breath, ( - )Chest Pain    MEDICATIONS  (STANDING):  aspirin  chewable 81 milliGRAM(s) Oral daily  chlorhexidine 2% Cloths 1 Application(s) Topical <User Schedule>  enoxaparin Injectable 30 milliGRAM(s) SubCutaneous every 24 hours  escitalopram 5 milliGRAM(s) Oral daily  furosemide   Injectable 40 milliGRAM(s) IV Push daily  metoprolol succinate ER 25 milliGRAM(s) Oral daily  sacubitril 24 mG/valsartan 26 mG 1 Tablet(s) Oral two times a day    MEDICATIONS  (PRN):  LORazepam     Tablet 1 milliGRAM(s) Oral at bedtime PRN for anxiety          T(C): 36.6 (05-05 @ 04:58), Max: 36.6 (05-04 @ 11:06)   HR: 99   BP: 133/83   RR: 18   SpO2: 98%    PHYSICAL EXAM:    CONSTITUTIONAL: NAD, well-developed, well-groomed  EYES: PERRLA; conjunctiva and sclera clear  ENMT: Moist oral mucosa, no pharyngeal injection or exudates; normal dentition  NECK: Supple, no palpable masses; no thyromegaly  RESPIRATORY: Normal respiratory effort; lungs are clear to auscultation bilaterally  CARDIOVASCULAR: Regular rate and rhythm, normal S1 and S2, no murmur/rub/gallop; No lower extremity edema; Peripheral pulses are 2+ bilaterally  ABDOMEN: Nontender to palpation, normoactive bowel sounds, no rebound/guarding; No hepatosplenomegaly  MUSCULOSKELETAL:  no clubbing or cyanosis of digits; no joint swelling or tenderness to palpation  PSYCH: A+O to person, place, and time; affect appropriate  NEUROLOGY: CN 2-12 are intact and symmetric; no gross sensory deficits   SKIN: No rashes; no palpable lesions      LABS:                        11.8   8.22  )-----------( 180      ( 05 May 2023 07:08 )             38.1      05-05    145  |  104  |  21  ----------------------------<  76  3.5   |  25  |  0.65    Ca    8.6      05 May 2023 07:10  Mg     1.7     05-04         CAPILLARY BLOOD GLUCOSE          RADIOLOGY & ADDITIONAL TESTS:    Imaging Personally Reviewed:  Consultant(s) Notes Reviewed:    Care Discussed with Consultants/Other Providers:   Andre Reyes, M.D.  Pager: [phone]  Office: [phone]    Patient is a 88y old  Female who presents with a chief complaint of CP and SOB (04 May 2023 12:48)        SUBJECTIVE / OVERNIGHT EVENTS:    No acute overnight events.  feeling better      ROS: ( - ) Fever, ( - )Chills,  ( - )Nausea/Vomiting, ( - ) Cough, ( - )Shortness of breath, ( - )Chest Pain    MEDICATIONS  (STANDING):  aspirin  chewable 81 milliGRAM(s) Oral daily  chlorhexidine 2% Cloths 1 Application(s) Topical <User Schedule>  enoxaparin Injectable 30 milliGRAM(s) SubCutaneous every 24 hours  escitalopram 5 milliGRAM(s) Oral daily  furosemide   Injectable 40 milliGRAM(s) IV Push daily  metoprolol succinate ER 25 milliGRAM(s) Oral daily  sacubitril 24 mG/valsartan 26 mG 1 Tablet(s) Oral two times a day    MEDICATIONS  (PRN):  LORazepam     Tablet 1 milliGRAM(s) Oral at bedtime PRN for anxiety          T(C): 36.6 (05-05 @ 04:58), Max: 36.6 (05-04 @ 11:06)   HR: 99   BP: 133/83   RR: 18   SpO2: 98%    PHYSICAL EXAM:    CONSTITUTIONAL: NAD, well-developed, well-groomed  EYES: PERRLA; conjunctiva and sclera clear  ENMT: Moist oral mucosa, no pharyngeal injection or exudates; normal dentition  NECK: Supple, no palpable masses; no thyromegaly  RESPIRATORY: Normal respiratory effort; lungs are clear to auscultation bilaterally  CARDIOVASCULAR: Regular rate and rhythm, normal S1 and S2, no murmur/rub/gallop; No lower extremity edema; Peripheral pulses are 2+ bilaterally  ABDOMEN: Nontender to palpation, normoactive bowel sounds, no rebound/guarding; No hepatosplenomegaly  MUSCULOSKELETAL:  no clubbing or cyanosis of digits; no joint swelling or tenderness to palpation  PSYCH: A+O to person, place, and time; affect appropriate  NEUROLOGY: CN 2-12 are intact and symmetric; no gross sensory deficits   SKIN: No rashes; no palpable lesions      LABS:                        11.8   8.22  )-----------( 180      ( 05 May 2023 07:08 )             38.1      05-05    145  |  104  |  21  ----------------------------<  76  3.5   |  25  |  0.65    Ca    8.6      05 May 2023 07:10  Mg     1.7     05-04         CAPILLARY BLOOD GLUCOSE          RADIOLOGY & ADDITIONAL TESTS:    Imaging Personally Reviewed:  Consultant(s) Notes Reviewed:    Care Discussed with Consultants/Other Providers:

## 2023-05-05 NOTE — DIETITIAN INITIAL EVALUATION ADULT - REASON INDICATOR FOR ASSESSMENT
Pt seen for consult for MST score 2 or greater and pressure injury stage II or greater. Information obtained from pt, RN, electronic medical record, daughter (Smiley)at bedside. Chart reviewed, events noted.

## 2023-05-05 NOTE — DIETITIAN INITIAL EVALUATION ADULT - ENERGY INTAKE
Poor (<50%) Pt observed with ~25% PO intake at breakfast, stating foods provided are too hard for her, requesting softer foods. Food preferences noted, prefer ice cream, milk shake, pudding, yogurt. Will add to pt's tray to encourage PO intake. Daughter is amendable to try oral nutrition supplement 2x daily to optimize PO intake while in house.

## 2023-05-05 NOTE — DIETITIAN INITIAL EVALUATION ADULT - EDUCATION DIETARY MODIFICATIONS
Continue to promote PO intake at meals/snacks with consumption of oral nutrition supplements between meals. Encouraged PO intake as tolerated with emphasis on protein foods as tolerated. Educated on foods rich in protein and encouraged consumption as able. Family amenable to recommendations. All nutrition-related questions answered. Family made aware RD remains available./(1) partially meets; needs review/practice/verbalization

## 2023-05-05 NOTE — CONSULT NOTE ADULT - CONSULT REASON
87 y/o F with HF presenting with acute on chronic HF but worsening diffuse lymphadenopathy of unknown etiology. Largest LN r axila.  Patient is amenable to targeted therapies

## 2023-05-05 NOTE — CONSULT NOTE ADULT - TIME BILLING
AS above, although risk of biopsy is low it is not zero and if the results will not  of the patient then the risks of attempting biopsy  are not justified at this point in time.  If this changes then the biopsy can be performed at any time in the future.
As above, can perform LN biopsy for at either axilla or groin for presumed systemic process diagnosis.

## 2023-05-05 NOTE — DIETITIAN NUTRITION RISK NOTIFICATION - BUCCAL DEPLETION IS
Patient states she completed a Covid-19 vaccine series > 2 weeks ago. Informed patient to bring proof of vaccination status DOS. Patient verbalized understanding.    s mild

## 2023-05-05 NOTE — DIETITIAN INITIAL EVALUATION ADULT - NSFNSGIIOFT_GEN_A_CORE
Pt confirms Ht 68"  Dosing wt: 70.2kg/154.4lb (5/3)  UBW: 154lb per daughter,  suspecting pt maybe losing wt due to decreased PO intake since Jan 2023.   Wt from current admission (bedscale): 153.2lb (5/5) ? accuracy of scale, RD will continue to monitor wt trends as available/able.   Wt history from previous admission: 145.1lb (1/14/23), 153lb (2/6/21)  IBW: 140lb.

## 2023-05-05 NOTE — DIETITIAN INITIAL EVALUATION ADULT - ORAL NUTRITION SUPPLEMENTS
Recommend Ensure plus high protein 240ml 2x daily (700kcal, 40g proteins) to optimize calories and nutrient intake while in house.

## 2023-05-05 NOTE — CONSULT NOTE ADULT - SUBJECTIVE AND OBJECTIVE BOX
Oncology Consult Note    HPI as per admitting team:   CC: 89 y/o presenting with CP and sob for 2 days    History obtained via daughter due to patient preference    89 y/o F  with a h/o HFrEF, LBBB, PE previously on xarelto (no longer), depression, HLD, Femoral neck fracture in January s/p L hip arthroplasty presenting with worsening chest pain and shortness of breath. After hemiarthroplasty in January. patient never recovered full strength. Continued to be deconditioned which has progressed to the last 2 weeks where she is essentially bed bound. Poor po intake but denies weight loss/night sweats. Denies dark stool. Over the past few weeks, has noted that movement is limited 2/2 to sob, improves with rest. Denies changes in diet/recent sick contacts/changes in medication. Over the last 2 days noted new onset cp with minimal exertion (movement for changing). At this point presented to the ed.    In the ED afeb hds. sat 99 on 2L NC. Labs notable for proBNP 9K (previously 4K). EKG with unchanged LBBB. CT Chest/A/P with b/l pleural effusions with extensive thoracic and retroperitoneal lymphadenopathy increased since January CT. Cards consulted, received 40 IV lasix *1.  (03 May 2023 15:46)        REVIEW OF SYSTEMS:  CONSTITUTIONAL: No weakness, fevers or chills  EYES/ENT: No visual changes;  No vertigo or throat pain   NECK: No pain or stiffness  RESPIRATORY: No cough, wheezing, hemoptysis; No shortness of breath  CARDIOVASCULAR: No chest pain or palpitations  GASTROINTESTINAL: No abdominal or epigastric pain. No nausea, vomiting, or hematemesis; No diarrhea or constipation. No melena or hematochezia.  GENITOURINARY: No dysuria, frequency or hematuria  NEUROLOGICAL: No numbness or weakness  SKIN: No itching, burning, rashes, or lesions   All other review of systems is negative unless indicated above.    PAST MEDICAL & SURGICAL HISTORY:  Anxiety      Depression      Diverticulitis      Macular degeneration  right eye      MVP (mitral valve prolapse)      OA (osteoarthritis)  knee and hip      Hyperlipidemia      Status post cholecystectomy      S/P left cataract extraction  right and left          FAMILY HISTORY:  No pertinent family history in first degree relatives        SOCIAL HISTORY:     Allergies    No Known Allergies    Intolerances        MEDICATIONS  (STANDING):  aspirin  chewable 81 milliGRAM(s) Oral daily  chlorhexidine 2% Cloths 1 Application(s) Topical <User Schedule>  enoxaparin Injectable 30 milliGRAM(s) SubCutaneous every 24 hours  escitalopram 5 milliGRAM(s) Oral daily  furosemide   Injectable 40 milliGRAM(s) IV Push daily  metoprolol succinate ER 25 milliGRAM(s) Oral daily  sacubitril 24 mG/valsartan 26 mG 1 Tablet(s) Oral two times a day    MEDICATIONS  (PRN):  LORazepam     Tablet 1 milliGRAM(s) Oral at bedtime PRN for anxiety      OBJECTIVE       T(F): 97.9 (05-05-23 @ 04:58), Max: 97.9 (05-05-23 @ 04:58)  HR: 99 (05-05-23 @ 04:58)  BP: 133/83 (05-05-23 @ 04:58)  RR: 18 (05-05-23 @ 04:58)  SpO2: 98% (05-05-23 @ 04:58)  Wt(kg): --    PHYSICAL EXAM   GENERAL: NAD, well-developed  HEAD:  Atraumatic, Normocephalic  EYES: EOMI, PERRLA, conjunctiva and sclera clear  NECK: Supple, No JVD  CHEST/LUNG: Clear to auscultation bilaterally; No wheeze  HEART: Regular rate and rhythm; No murmurs, rubs, or gallops  ABDOMEN: Soft, Nontender, Nondistended; Bowel sounds present  EXTREMITIES:  2+ Peripheral Pulses, No clubbing, cyanosis, or edema  NEUROLOGY: non-focal  SKIN: No rashes or lesions                          11.8   8.22  )-----------( 180      ( 05 May 2023 07:08 )             38.1       05-05    145  |  104  |  21  ----------------------------<  76  3.5   |  25  |  0.65    Ca    8.6      05 May 2023 07:10  Mg     1.7     05-04      RADIOLOGY & ADDITIONAL TESTING:   < from: CT Angio Abdomen and Pelvis w/ IV Cont (05.03.23 @ 09:57) >  Findings:    Vascular: On the precontrast series, there is no intramural hematoma.   Following IV contrast injection, there is no dissection flap identified.   The aorta is normal in size. The celiac axis, SMA, bilateral renal   arteries and FRANCO are patent. The bilateral common iliac arteries and   their branches are patent. Atherosclerotic disease noted within the aorta      Chest:  Thyroid gland demonstrates a right thyroid lobe nodule measuring   1.3 cm.. Pulmonary arteries are normal in size. Multiple lymph nodes   noted throughout the mediastinum and axilla, many of which are   hyperdense. The largest is in the right axillary station measuring 4.0 x   2.6 cm, increased since the previous exam.    Moderate bilateral pleural effusions noted with compression atelectasis   of the right middle and lower lobes. Partial left lower lobe atelectasis   also noted.      Abdomen and Pelvis: Hepatic hypodensity is too small to characterize.   Post cholecystectomy. Splenomegaly noted. The pancreas and right adrenal   gland are unremarkable. Hyperdense left adrenal nodule measures 2.3 x 2.0   cm.    Extensive retroperitoneal lymphadenopathy with the largest node measuring   1.6 cm.    No stones or hydronephrosis. Bilateral renal cysts are identified.    No evidence of bowel obstruction. Colonic diverticulosis without   diverticulitis. Perirectal soft tissue stranding noted but is partially   obscured by streak artifact from left hip prosthesis may reflect focal   area of edema. Small amount of dependent fluid noted. Calcified fibroid   uterus is present. Left inguinal lymphadenopathy measuring up to 2.9 cm.    Degenerative changes noted throughout the spine. Altered level   compression deformities within the upper lumbar spine and of   indeterminate age.        Impression:    No intramural hematoma or aortic dissection.    Extensive thoracic and retroperitoneal lymphadenopathy, some of which is   hyperdense in nature. The largest lymph node is seen in the right axilla   measuring 4 x 2.6 cm. These have markedly increased since 1/11/2023   examination. Differential includes lymphoma, metastatic disease from   hyperdense tumor, or densities such as Castleman's disease.    Small-to-moderate bilateral pleural effusions with right middle and lower   lobe atelectasis and partial left lower lobe atelectasis.    Remaining findings as described above.    --- End of Report ---    CALDERON TAPIA MD; Attending Radiologist  This document has been electronically signed. May  3 2023 10:41AM    < end of copied text >         Oncology Consult Note    HPI as per admitting team:   CC: 89 y/o presenting with CP and sob for 2 days    History obtained via daughter due to patient preference    89 y/o F  with a h/o HFrEF, LBBB, PE previously on xarelto (no longer), depression, HLD, Femoral neck fracture in January s/p L hip arthroplasty presenting with worsening chest pain and shortness of breath. After hemiarthroplasty in January. patient never recovered full strength. Continued to be deconditioned which has progressed to the last 2 weeks where she is essentially bed bound. Poor po intake but denies weight loss/night sweats. Denies dark stool. Over the past few weeks, has noted that movement is limited 2/2 to sob, improves with rest. Denies changes in diet/recent sick contacts/changes in medication. Over the last 2 days noted new onset cp with minimal exertion (movement for changing). At this point presented to the ed.    In the ED afeb hds. sat 99 on 2L NC. Labs notable for proBNP 9K (previously 4K). EKG with unchanged LBBB. CT Chest/A/P with b/l pleural effusions with extensive thoracic and retroperitoneal lymphadenopathy increased since January CT. Cards consulted, received 40 IV lasix *1.  (03 May 2023 15:46)      REVIEW OF SYSTEMS:  CONSTITUTIONAL: +Poor appetite. +Weight loss No weakness, fevers or chills  EYES/ENT: No visual changes;  No vertigo or throat pain   RESPIRATORY: +Shortness of breath. No cough, wheezing, hemoptysis  CARDIOVASCULAR: No chest pain or palpitations  GASTROINTESTINAL: No abdominal or epigastric pain. No nausea, vomiting, or hematemesis; No diarrhea or constipation. No melena or hematochezia.  GENITOURINARY: No dysuria, frequency or hematuria  NEUROLOGICAL: No numbness or weakness  SKIN: No itching, burning, rashes, or lesions   All other review of systems is negative unless indicated above.    PAST MEDICAL & SURGICAL HISTORY:  Anxiety  Depression  Diverticulitis    Macular degeneration  right eye    MVP (mitral valve prolapse)    OA (osteoarthritis)  knee and hip    Hyperlipidemia    Status post cholecystectomy    S/P left cataract extraction  right and left    FAMILY HISTORY:  No pertinent family history in first degree relatives of cancer.      SOCIAL HISTORY:   Very briefly smoked cigarettes in her early 20's, her  smoked cigarettes but made efforts to reduce secondhand smoke exposure. Her  passed away due to lung cancer. Prior occasional EtOH use, nothing recently. No illicit drug use. Lives with her daughter.    Allergies  No Known Allergies      MEDICATIONS  (STANDING):  aspirin  chewable 81 milliGRAM(s) Oral daily  chlorhexidine 2% Cloths 1 Application(s) Topical <User Schedule>  enoxaparin Injectable 30 milliGRAM(s) SubCutaneous every 24 hours  escitalopram 5 milliGRAM(s) Oral daily  furosemide   Injectable 40 milliGRAM(s) IV Push daily  metoprolol succinate ER 25 milliGRAM(s) Oral daily  sacubitril 24 mG/valsartan 26 mG 1 Tablet(s) Oral two times a day    MEDICATIONS  (PRN):  LORazepam     Tablet 1 milliGRAM(s) Oral at bedtime PRN for anxiety      OBJECTIVE   T(F): 97.9 (05-05-23 @ 04:58), Max: 97.9 (05-05-23 @ 04:58)  HR: 99 (05-05-23 @ 04:58)  BP: 133/83 (05-05-23 @ 04:58)  RR: 18 (05-05-23 @ 04:58)  SpO2: 98% (05-05-23 @ 04:58)  Wt(kg): --    PHYSICAL EXAM   GENERAL: Cachectic. NAD on NC 2LPM  HEAD:  Atraumatic, Normocephalic  EYES: EOMI, PERRLA, conjunctiva and sclera clear  CHEST/LUNG: Clear to auscultation bilaterally; No wheeze  HEART: Regular rate and rhythm; No murmurs, rubs, or gallops  ABDOMEN: Soft, Nontender, Nondistended; Bowel sounds present  EXTREMITIES:  2+ Peripheral Pulses, No clubbing, cyanosis, or edema  NEUROLOGY: non-focal  SKIN: No rashes or lesions                          11.8   8.22  )-----------( 180      ( 05 May 2023 07:08 )             38.1       05-05    145  |  104  |  21  ----------------------------<  76  3.5   |  25  |  0.65    Ca    8.6      05 May 2023 07:10  Mg     1.7     05-04      RADIOLOGY & ADDITIONAL TESTING:   < from: CT Angio Abdomen and Pelvis w/ IV Cont (05.03.23 @ 09:57) >  Findings:    Vascular: On the precontrast series, there is no intramural hematoma.   Following IV contrast injection, there is no dissection flap identified.   The aorta is normal in size. The celiac axis, SMA, bilateral renal   arteries and FRANCO are patent. The bilateral common iliac arteries and   their branches are patent. Atherosclerotic disease noted within the aorta      Chest:  Thyroid gland demonstrates a right thyroid lobe nodule measuring   1.3 cm.. Pulmonary arteries are normal in size. Multiple lymph nodes   noted throughout the mediastinum and axilla, many of which are   hyperdense. The largest is in the right axillary station measuring 4.0 x   2.6 cm, increased since the previous exam.    Moderate bilateral pleural effusions noted with compression atelectasis   of the right middle and lower lobes. Partial left lower lobe atelectasis   also noted.      Abdomen and Pelvis: Hepatic hypodensity is too small to characterize.   Post cholecystectomy. Splenomegaly noted. The pancreas and right adrenal   gland are unremarkable. Hyperdense left adrenal nodule measures 2.3 x 2.0   cm.    Extensive retroperitoneal lymphadenopathy with the largest node measuring   1.6 cm.    No stones or hydronephrosis. Bilateral renal cysts are identified.    No evidence of bowel obstruction. Colonic diverticulosis without   diverticulitis. Perirectal soft tissue stranding noted but is partially   obscured by streak artifact from left hip prosthesis may reflect focal   area of edema. Small amount of dependent fluid noted. Calcified fibroid   uterus is present. Left inguinal lymphadenopathy measuring up to 2.9 cm.    Degenerative changes noted throughout the spine. Altered level   compression deformities within the upper lumbar spine and of   indeterminate age.        Impression:    No intramural hematoma or aortic dissection.    Extensive thoracic and retroperitoneal lymphadenopathy, some of which is   hyperdense in nature. The largest lymph node is seen in the right axilla   measuring 4 x 2.6 cm. These have markedly increased since 1/11/2023   examination. Differential includes lymphoma, metastatic disease from   hyperdense tumor, or densities such as Castleman's disease.    Small-to-moderate bilateral pleural effusions with right middle and lower   lobe atelectasis and partial left lower lobe atelectasis.    Remaining findings as described above.    --- End of Report ---    CALDERON TAPIA MD; Attending Radiologist  This document has been electronically signed. May  3 2023 10:41AM    < end of copied text >

## 2023-05-05 NOTE — DIETITIAN INITIAL EVALUATION ADULT - NSFNSPHYEXAMSKINFT_GEN_A_CORE
Per Wound care note 5/4:   bilateral sacrum/buttocks deep tissue injury with evolution  present on admission   Coccyx  unstageable pressure injury present on admission

## 2023-05-06 NOTE — PROGRESS NOTE ADULT - SUBJECTIVE AND OBJECTIVE BOX
[facility] Division of Hospital Medicine  Jed Walker MD  Available via MS Teams  Pager [phone]    SUBJECTIVE / OVERNIGHT EVENTS: Patient seen and examined at bedside, resting comfortably and in no acute distress. Denies chest pain or palpitations. Denies shortness of breath. Reports she needs to go to the bathroom and denies constipation. ROS otherwise noncontributory.     MEDICATIONS  (STANDING):  ascorbic acid 500 milliGRAM(s) Oral daily  aspirin  chewable 81 milliGRAM(s) Oral daily  atorvastatin 40 milliGRAM(s) Oral at bedtime  chlorhexidine 2% Cloths 1 Application(s) Topical <User Schedule>  enoxaparin Injectable 30 milliGRAM(s) SubCutaneous every 24 hours  escitalopram 5 milliGRAM(s) Oral daily  furosemide    Tablet 40 milliGRAM(s) Oral daily  metoprolol succinate ER 25 milliGRAM(s) Oral daily  multivitamin 1 Tablet(s) Oral daily  sacubitril 24 mG/valsartan 26 mG 1 Tablet(s) Oral two times a day    MEDICATIONS  (PRN):  LORazepam     Tablet 1 milliGRAM(s) Oral at bedtime PRN for anxiety      I&O's Summary    05 May 2023 07:01  -  06 May 2023 07:00  --------------------------------------------------------  IN: 240 mL / OUT: 400 mL / NET: -160 mL        PHYSICAL EXAM:  Vital Signs Last 24 Hrs  T(C): 36.6 (06 May 2023 04:59), Max: 36.6 (06 May 2023 04:59)  T(F): 97.9 (06 May 2023 04:59), Max: 97.9 (06 May 2023 04:59)  HR: 94 (06 May 2023 04:59) (88 - 97)  BP: 102/72 (06 May 2023 04:59) (102/72 - 120/81)  BP(mean): --  RR: 18 (06 May 2023 04:59) (18 - 18)  SpO2: 97% (06 May 2023 04:59) (95% - 97%)    Parameters below as of 06 May 2023 04:59  Patient On (Oxygen Delivery Method): nasal cannula  O2 Flow (L/min): 2    CONSTITUTIONAL: NAD, well-groomed  EYES: PERRLA; conjunctiva and sclera clear  ENMT: Moist oral mucosa, no pharyngeal injection or exudates; normal dentition. LFNC in place.   NECK: Supple, no palpable masses; no thyromegaly  RESPIRATORY: Normal respiratory effort; lungs are clear to auscultation bilaterally  CARDIOVASCULAR: normal S1 and S2, no murmur/rub/gallop; No lower extremity edema  ABDOMEN: Nontender to palpation, normoactive bowel sounds, no rebound/guarding; No hepatosplenomegaly  MUSCULOSKELETAL:  no clubbing or cyanosis of digits; no joint swelling or tenderness to palpation  PSYCH: A+O to person, place, and time; affect appropriate  NEUROLOGY: CN 2-12 are intact and symmetric; no gross sensory deficits   SKIN: No rashes; no palpable lesions    LABS:                        12.3   10.16 )-----------( 182      ( 06 May 2023 07:05 )             39.9     05-06    146<H>  |  102  |  22  ----------------------------<  105<H>  3.0<L>   |  27  |  0.56    Ca    8.8      06 May 2023 07:05                Culture - Urine (collected 03 May 2023 11:55)  Source: Clean Catch Clean Catch (Midstream)  Final Report (04 May 2023 13:25):    No growth      SARS-CoV-2: NotDetec (03 May 2023 08:56)  COVID-19 PCR: NotDetec (18 Jan 2023 09:44)  COVID-19 PCR: NotDetec (12 Jan 2023 08:45)  COVID-19 PCR: NotDetec (10 Tavo 2023 20:28)

## 2023-05-07 NOTE — PROGRESS NOTE ADULT - SUBJECTIVE AND OBJECTIVE BOX
[facility] Division of Hospital Medicine  Jed Walker MD  Available via MS Teams  Pager [phone]    SUBJECTIVE / OVERNIGHT EVENTS: Patient seen and examined at bedside, resting comfortably and in no acute distress. Denies chest pain or palpitations. Denies shortness of breath or cough. ROS otherwise negative.     MEDICATIONS  (STANDING):  ascorbic acid 500 milliGRAM(s) Oral daily  aspirin  chewable 81 milliGRAM(s) Oral daily  atorvastatin 40 milliGRAM(s) Oral at bedtime  chlorhexidine 2% Cloths 1 Application(s) Topical <User Schedule>  enoxaparin Injectable 30 milliGRAM(s) SubCutaneous every 24 hours  escitalopram 5 milliGRAM(s) Oral daily  furosemide    Tablet 40 milliGRAM(s) Oral daily  metoprolol succinate ER 25 milliGRAM(s) Oral daily  multivitamin 1 Tablet(s) Oral daily  sacubitril 24 mG/valsartan 26 mG 1 Tablet(s) Oral two times a day    MEDICATIONS  (PRN):  LORazepam     Tablet 1 milliGRAM(s) Oral at bedtime PRN for anxiety      I&O's Summary    07 May 2023 07:01  -  07 May 2023 12:45  --------------------------------------------------------  IN: 50 mL / OUT: 0 mL / NET: 50 mL        PHYSICAL EXAM:  Vital Signs Last 24 Hrs  T(C): 36.5 (07 May 2023 11:13), Max: 36.6 (06 May 2023 20:14)  T(F): 97.7 (07 May 2023 11:13), Max: 97.8 (06 May 2023 20:14)  HR: 90 (07 May 2023 11:13) (90 - 100)  BP: 94/61 (07 May 2023 11:13) (94/61 - 119/78)  RR: 18 (07 May 2023 11:13) (18 - 18)  SpO2: 95% (07 May 2023 11:13) (95% - 97%)    Parameters below as of 07 May 2023 11:13  Patient On (Oxygen Delivery Method): nasal cannula  O2 Flow (L/min): 2    CONSTITUTIONAL: NAD, well-groomed  EYES: PERRLA; conjunctiva and sclera clear  ENMT: Moist oral mucosa, no pharyngeal injection or exudates; normal dentition  NECK: Supple, no palpable masses; no thyromegaly  RESPIRATORY: Normal respiratory effort; lungs are clear to auscultation bilaterally  CARDIOVASCULAR: normal S1 and S2, no murmur/rub/gallop; No lower extremity edema  ABDOMEN: Nontender to palpation, normoactive bowel sounds, no rebound/guarding; No hepatosplenomegaly  MUSCULOSKELETAL:  no clubbing or cyanosis of digits; no joint swelling or tenderness to palpation  PSYCH: A+O to person, place, and time; affect appropriate  NEUROLOGY: CN 2-12 are intact and symmetric; no gross sensory deficits   SKIN: No rashes; no palpable lesions    LABS:             11.7   9.04  )-----------( 165      ( 07 May 2023 06:44 )             38.1     147<H>  |  104  |  23  ----------------------------<  98  3.4<L>   |  29  |  0.59    Ca    8.6      07 May 2023 06:43                SARS-CoV-2: NotDetec (03 May 2023 08:56)  COVID-19 PCR: NotDetec (18 Jan 2023 09:44)  COVID-19 PCR: NotDetec (12 Jan 2023 08:45)  COVID-19 PCR: NotDetec (10 Tavo 2023 20:28)

## 2023-05-08 NOTE — PROGRESS NOTE ADULT - SUBJECTIVE AND OBJECTIVE BOX
DATE OF SERVICE: 05-08-23 @ 12:22    Patient is a 88y old  Female who presents with a chief complaint of CP and SOB (08 May 2023 10:35)      INTERVAL HISTORY: feels well, ambulating w/ PT    REVIEW OF SYSTEMS:  CONSTITUTIONAL: No weakness  EYES/ENT: No visual changes;  No throat pain   NECK: No pain or stiffness  RESPIRATORY: No cough, wheezing; No shortness of breath  CARDIOVASCULAR: No chest pain or palpitations  GASTROINTESTINAL: No abdominal  pain. No nausea, vomiting, or hematemesis  GENITOURINARY: No dysuria, frequency or hematuria  NEUROLOGICAL: No stroke like symptoms  SKIN: No rashes    TELEMETRY Personally reviewed: SR 80-90  	  MEDICATIONS:  metoprolol succinate ER 25 milliGRAM(s) Oral daily  sacubitril 24 mG/valsartan 26 mG 1 Tablet(s) Oral two times a day        PHYSICAL EXAM:  T(C): 37.1 (05-08-23 @ 11:19), Max: 37.1 (05-08-23 @ 11:19)  HR: 105 (05-08-23 @ 11:19) (94 - 109)  BP: 103/64 (05-08-23 @ 11:19) (97/68 - 115/76)  RR: 18 (05-08-23 @ 11:19) (18 - 18)  SpO2: 94% (05-08-23 @ 11:19) (91% - 94%)  Wt(kg): --  I&O's Summary    07 May 2023 07:01  -  08 May 2023 07:00  --------------------------------------------------------  IN: 1115 mL / OUT: 450 mL / NET: 665 mL          Appearance: In no distress	  HEENT:    PERRL, EOMI	  Cardiovascular:  S1 S2, No JVD  Respiratory: Lungs clear to auscultation	  Gastrointestinal:  Soft, Non-tender, + BS	  Vascularature:  No edema of LE  Psychiatric: Appropriate affect   Neuro: no acute focal deficits                               11.7   9.04  )-----------( 165      ( 07 May 2023 06:44 )             38.1     05-07    147<H>  |  104  |  23  ----------------------------<  98  3.4<L>   |  29  |  0.59    Ca    8.6      07 May 2023 06:43          Labs personally reviewed      ASSESSMENT/PLAN: 	  87 y/o F  with a h/o HFrEF, LBBB, PE previously on xarelto (no longer), depression, HLD, Femoral neck fracture in January s/p L hip arthroplasty presenting with worsening chest pain and shortness of breath. After hemiarthroplasty in January. patient never recovered full strength. Continued to be deconditioned which has progressed to the last 2 weeks where she is essentially bed bound. Poor po intake but denies weight loss/night sweats. Denies dark stool. Over the past few weeks, has noted that movement is limited 2/2 to sob, improves with rest. Denies changes in diet/recent sick contacts/changes in medication. Over the last 2 days noted new onset cp with minimal exertion (movement for changing).       Problem/Plan - 1:  ·  Problem: Chronic Systolic HF  ·  Plan:   - [facility] hospitalization records obtained from November 24, 2022: Admitted there with chest pain, found to have as noted on CTA report "small volume PE in the left upper lobe segmental branches and ? RLL subsegmental branches"  - Trop noted to be 1597 but was never trended to peak, EKG there with LBBB consistent with MI. Found to have newly reduced EF last admission.   - BNP elevated ~9K  - CT Chest with small to moderate pleural effusions  - c/w ASA 81mg PO daily, Metoprolol and Entresto   - Appreciate strict I&Os, daily weights  - Now saturating wnl on room air.   - Agree with transition to Lasix 40mg PO daily    Problem/Plan - 2:  ·  Problem: Chest Pain  ·  Plan: Patient reports progressive SOB and midsternal CP with minimal exertion for the past 2 weeks.   - Trop 51 --> 49 (lower than previous admission)  - EKG ST with LBBB (unchanged from baseline)  - CTA with no PE/Dissection but diffuse lymphadenopathy suggestive of malignancy  - c/w medical mgmt for presumed CAD with ASA, BB    Problem/Plan - 3:  ·  Problem: Pulmonary embolism.   ·  Plan:    - CTA with no PE noted  - LE US neg for DVT  - Can discontinue AC for now since there is no active indication  - c/w lovenox for DVT PPx    Problem/Plan - 4:  ·  Problem: Essential hypertension.   ·  Plan: BP overall acceptable   - c/w Metoprolol and Entresto  - c/w Lasix 40mg PO daily        MEGAN Johns, [facility]  Cardiovascular Medicine  [address] [place] [address], [address]  Available through call or text on [employer]  Office: [phone]

## 2023-05-08 NOTE — PROGRESS NOTE ADULT - SUBJECTIVE AND OBJECTIVE BOX
Andre Reyes, M.D.  Pager: [phone]  Office: [phone]    Patient is a 88y old  Female who presents with a chief complaint of CP and SOB (07 May 2023 12:45)          SUBJECTIVE / OVERNIGHT EVENTS:    No acute overnight events.    ROS: ( - ) Fever, ( - )Chills,  ( - )Nausea/Vomiting, ( - ) Cough, ( - )Shortness of breath, ( - )Chest Pain    MEDICATIONS  (STANDING):  ascorbic acid 500 milliGRAM(s) Oral daily  aspirin  chewable 81 milliGRAM(s) Oral daily  atorvastatin 40 milliGRAM(s) Oral at bedtime  chlorhexidine 2% Cloths 1 Application(s) Topical <User Schedule>  enoxaparin Injectable 30 milliGRAM(s) SubCutaneous every 24 hours  escitalopram 5 milliGRAM(s) Oral daily  furosemide    Tablet 40 milliGRAM(s) Oral daily  lactated ringers. 500 milliLiter(s) (75 mL/Hr) IV Continuous <Continuous>  metoprolol succinate ER 25 milliGRAM(s) Oral daily  multivitamin 1 Tablet(s) Oral daily  potassium chloride    Tablet ER 40 milliEquivalent(s) Oral once  sacubitril 24 mG/valsartan 26 mG 1 Tablet(s) Oral two times a day    MEDICATIONS  (PRN):  LORazepam     Tablet 1 milliGRAM(s) Oral at bedtime PRN for anxiety          T(C): 37 (05-08 @ 06:10), Max: 37 (05-08 @ 06:10)   HR: 109   BP: 112/76   RR: 18   SpO2: 94%    PHYSICAL EXAM:    CONSTITUTIONAL: NAD, well-developed, well-groomed  EYES: PERRLA; conjunctiva and sclera clear  ENMT: Moist oral mucosa, no pharyngeal injection or exudates; normal dentition  NECK: Supple, no palpable masses; no thyromegaly  RESPIRATORY: Normal respiratory effort; lungs are clear to auscultation bilaterally  CARDIOVASCULAR: Regular rate and rhythm, normal S1 and S2, no murmur/rub/gallop; No lower extremity edema; Peripheral pulses are 2+ bilaterally  ABDOMEN: Nontender to palpation, normoactive bowel sounds, no rebound/guarding; No hepatosplenomegaly  MUSCULOSKELETAL:  no clubbing or cyanosis of digits; no joint swelling or tenderness to palpation  PSYCH: A+O to person, place, and time; affect appropriate  NEUROLOGY: CN 2-12 are intact and symmetric; no gross sensory deficits   SKIN: No rashes; no palpable lesions      LABS:                        11.7   9.04  )-----------( 165      ( 07 May 2023 06:44 )             38.1      05-07    147<H>  |  104  |  23  ----------------------------<  98  3.4<L>   |  29  |  0.59    Ca    8.6      07 May 2023 06:43         CAPILLARY BLOOD GLUCOSE          RADIOLOGY & ADDITIONAL TESTS:    Imaging Personally Reviewed:  Consultant(s) Notes Reviewed:    Care Discussed with Consultants/Other Providers:   Andre Reyes, M.D.  Pager: [phone]  Office: [phone]    Patient is a 88y old  Female who presents with a chief complaint of CP and SOB (07 May 2023 12:45)          SUBJECTIVE / OVERNIGHT EVENTS:    No acute overnight events.  feels weak today  no shortness of breath.    ROS: ( - ) Fever, ( - )Chills,  ( - )Nausea/Vomiting, ( - ) Cough, ( - )Shortness of breath, ( - )Chest Pain    MEDICATIONS  (STANDING):  ascorbic acid 500 milliGRAM(s) Oral daily  aspirin  chewable 81 milliGRAM(s) Oral daily  atorvastatin 40 milliGRAM(s) Oral at bedtime  chlorhexidine 2% Cloths 1 Application(s) Topical <User Schedule>  enoxaparin Injectable 30 milliGRAM(s) SubCutaneous every 24 hours  escitalopram 5 milliGRAM(s) Oral daily  furosemide    Tablet 40 milliGRAM(s) Oral daily  lactated ringers. 500 milliLiter(s) (75 mL/Hr) IV Continuous <Continuous>  metoprolol succinate ER 25 milliGRAM(s) Oral daily  multivitamin 1 Tablet(s) Oral daily  potassium chloride    Tablet ER 40 milliEquivalent(s) Oral once  sacubitril 24 mG/valsartan 26 mG 1 Tablet(s) Oral two times a day    MEDICATIONS  (PRN):  LORazepam     Tablet 1 milliGRAM(s) Oral at bedtime PRN for anxiety          T(C): 37 (05-08 @ 06:10), Max: 37 (05-08 @ 06:10)   HR: 109   BP: 112/76   RR: 18   SpO2: 94%    PHYSICAL EXAM:    CONSTITUTIONAL: NAD, well-developed, well-groomed  EYES: PERRLA; conjunctiva and sclera clear  ENMT: Moist oral mucosa, no pharyngeal injection or exudates; normal dentition  NECK: Supple, no palpable masses; no thyromegaly  RESPIRATORY: Normal respiratory effort; lungs are clear to auscultation bilaterally  CARDIOVASCULAR: Regular rate and rhythm, normal S1 and S2, no murmur/rub/gallop; No lower extremity edema; Peripheral pulses are 2+ bilaterally  ABDOMEN: Nontender to palpation, normoactive bowel sounds, no rebound/guarding; No hepatosplenomegaly  MUSCULOSKELETAL:  no clubbing or cyanosis of digits; no joint swelling or tenderness to palpation  PSYCH: A+O to person, place, and time; affect appropriate  NEUROLOGY: CN 2-12 are intact and symmetric; no gross sensory deficits   SKIN: No rashes; no palpable lesions      LABS:                        11.7   9.04  )-----------( 165      ( 07 May 2023 06:44 )             38.1      05-07    147<H>  |  104  |  23  ----------------------------<  98  3.4<L>   |  29  |  0.59    Ca    8.6      07 May 2023 06:43         CAPILLARY BLOOD GLUCOSE          RADIOLOGY & ADDITIONAL TESTS:    Imaging Personally Reviewed:  Consultant(s) Notes Reviewed:    Care Discussed with Consultants/Other Providers:

## 2023-05-08 NOTE — CHART NOTE - NSCHARTNOTEFT_GEN_A_CORE
MEDICINE PA NOTE    Patient is confined to a single room without a bathroom, therefore would benefit from 3:1 commode.     Renetta WILMA Burk   [phone]

## 2023-05-08 NOTE — CHART NOTE - NSCHARTNOTEFT_GEN_A_CORE
MEDICINE PA NOTE     Patient will require a hospital bed due to acute on chronic heart failure exacerbation. Patient requires the head of the bed to be elevated more than 30 degrees. Pillows and wedges are not effective. Patient requires positioning of the body in ways not feasible with an ordinary bed. Patient requires frequent repositioning in order to alleviate pain. Patient requires a gel overlay due to limited mobility and compromised circulatory status.     WILMA Eaton  [phone]

## 2023-05-09 NOTE — PROVIDER CONTACT NOTE (OTHER) - RECOMMENDATIONS
pa MADE AWARE
Notify Provider.
Joan DILLON made aware
Notified WILMA Duong.
Notify provider.
PA made aware

## 2023-05-09 NOTE — PROVIDER CONTACT NOTE (OTHER) - ACTION/TREATMENT ORDERED:
As per Joan DILLON, will continue to monitor
PER pa WILL DO stat BMP , mag and pgos and STAT EKG , will continue to monitor
Provider made aware. 12 lead ekg ordered. Monitoring remains ongoing.
Provider made aware. Wound consult ordered. Cavillon and foam placed on patient's sacrum. Monitoring remains ongoing.
awaiting orders from PA , will continue to monitor
PA made ware. Was advised to not give anymore ativan during rest of the night. Rn will frequently check pt's b/p. Monitoring remains ongoing.

## 2023-05-09 NOTE — PROVIDER CONTACT NOTE (OTHER) - DATE AND TIME:
08-May-2023 14:34
03-May-2023 23:41
03-May-2023 22:00
09-May-2023 06:40
04-May-2023 00:45
08-May-2023 18:55

## 2023-05-09 NOTE — PROVIDER CONTACT NOTE (OTHER) - ASSESSMENT
BP 99/67, HR 75, 97% on 2LNC, RR 18, temperature 97.5, FS 91, and NSR 80-90's on tele. Pt reactive to painful stimuli. Pt finally became more alert, knows name, and . Pt able to finally take medication.
Patient remains aox3;vss. Denies CP, SOB, and heart palpitations.
Patient's DTI on sacrum measures at 0.7X5cm. Patient denies pain.
vitals noted , denies any Abdominal pain
Pt A/Ox2-3. Pt had a total of 275ml & 1 incontinent count UO for the past 12 hours. UO yellow and clear. Bladder scan @ 0:00 showed 44ml and bladder scan at 03:00 showed 35ml.
vitals noted , pt is Aox2 , denies any chest pain , SOB , dizziness at this time

## 2023-05-09 NOTE — PRE PROCEDURE NOTE - PRE PROCEDURE EVALUATION
Interventional Radiology  HPI: 88y Female w/ FTT found to have worsening lymphadenopathy presents to IR for inguinal LN biopsy.    Allergies: No Known Allergies    Medications (Abx/Cardiac/Anticoagulation/Blood Products)  aspirin  chewable: 81 milliGRAM(s) Oral ( @ 11:03)  enoxaparin Injectable: 30 milliGRAM(s) SubCutaneous ( @ 13:24)  furosemide    Tablet: 40 milliGRAM(s) Oral ( @ 06:13)  metoprolol succinate ER: 25 milliGRAM(s) Oral ( @ 05:25)  sacubitril 24 mG/valsartan 26 m Tablet(s) Oral ( @ 05:24)    Data:    T(C): 36.4  HR: 97  BP: 127/71  RR: 17  SpO2: 91%    Exam  General: No acute distress  Chest: Non labored breathing  Abdomen: Non-distended  Extremities: No swelling, warm    -WBC 9.04 / HgB 11.7 / Hct 38.1 / Plt 165  -Na 146 / Cl 103 / BUN 26 / Glucose 91  -K 3.3 / CO2 27 / Cr 0.58  -ALT -- / Alk Phos -- / T.Bili --    Imaging: reviewed    Plan: 88y Female presents for   -Risks/Benefits/alternatives explained with the healthcare proxy/daughter, Ines Schmid [phone] and witnessed informed consent obtained. DNR is affirmed.

## 2023-05-09 NOTE — DISCHARGE NOTE NURSING/CASE MANAGEMENT/SOCIAL WORK - NSDCPEFALRISK_GEN_ALL_CORE
For information on Fall & Injury Prevention, visit: [email or web address] OR  [email or web address] OR  https://www.cdc.gov/steadi/patient.html

## 2023-05-09 NOTE — DISCHARGE NOTE NURSING/CASE MANAGEMENT/SOCIAL WORK - NSSCCARECORD_GEN_ALL_CORE
Home Care Agency/Durable Medical Equipment Agency/[facility]

## 2023-05-09 NOTE — PROGRESS NOTE ADULT - SUBJECTIVE AND OBJECTIVE BOX
Andre Reyes, M.D.  Pager: [phone]  Office: [phone]    Patient is a 88y old  Female who presents with a chief complaint of CP and SOB (09 May 2023 10:47)          SUBJECTIVE / OVERNIGHT EVENTS:    No acute overnight events.    ROS: ( - ) Fever, ( - )Chills,  ( - )Nausea/Vomiting, ( - ) Cough, ( - )Shortness of breath, ( - )Chest Pain    MEDICATIONS  (STANDING):  ascorbic acid 500 milliGRAM(s) Oral daily  aspirin  chewable 81 milliGRAM(s) Oral daily  atorvastatin 40 milliGRAM(s) Oral at bedtime  chlorhexidine 2% Cloths 1 Application(s) Topical <User Schedule>  escitalopram 5 milliGRAM(s) Oral daily  lactated ringers. 500 milliLiter(s) (75 mL/Hr) IV Continuous <Continuous>  metoprolol succinate ER 25 milliGRAM(s) Oral daily  multivitamin 1 Tablet(s) Oral daily  potassium chloride   Powder 40 milliEquivalent(s) Oral every 4 hours  sacubitril 24 mG/valsartan 26 mG 1 Tablet(s) Oral two times a day  senna 2 Tablet(s) Oral at bedtime    MEDICATIONS  (PRN):  LORazepam     Tablet 1 milliGRAM(s) Oral at bedtime PRN for anxiety          T(C): 36.4 (05-09 @ 04:00), Max: 37.2 (05-08 @ 20:37)   HR: 97   BP: 127/71   RR: 17   SpO2: 91%    PHYSICAL EXAM:    CONSTITUTIONAL: NAD, well-developed, well-groomed  EYES: PERRLA; conjunctiva and sclera clear  ENMT: Moist oral mucosa, no pharyngeal injection or exudates; normal dentition  NECK: Supple, no palpable masses; no thyromegaly  RESPIRATORY: Normal respiratory effort; lungs are clear to auscultation bilaterally  CARDIOVASCULAR: Regular rate and rhythm, normal S1 and S2, no murmur/rub/gallop; No lower extremity edema; Peripheral pulses are 2+ bilaterally  ABDOMEN: Nontender to palpation, normoactive bowel sounds, no rebound/guarding; No hepatosplenomegaly  MUSCULOSKELETAL:  no clubbing or cyanosis of digits; no joint swelling or tenderness to palpation  PSYCH: A+O to person, place, and time; affect appropriate  NEUROLOGY: CN 2-12 are intact and symmetric; no gross sensory deficits   SKIN: No rashes; no palpable lesions      LABS:     05-09    146<H>  |  103  |  26<H>  ----------------------------<  91  3.3<L>   |  27  |  0.58    Ca    8.6      09 May 2023 06:56  Phos  2.9     05-09  Mg     2.1     05-09         CAPILLARY BLOOD GLUCOSE          RADIOLOGY & ADDITIONAL TESTS:    Imaging Personally Reviewed:  Consultant(s) Notes Reviewed:    Care Discussed with Consultants/Other Providers:   Andre Reyes, M.D.  Pager: [phone]  Office: [phone]    Patient is a 88y old  Female who presents with a chief complaint of CP and SOB (09 May 2023 10:47)          SUBJECTIVE / OVERNIGHT EVENTS:    No acute overnight events.  Feels tired and weak today.    ROS: ( - ) Fever, ( - )Chills,  ( - )Nausea/Vomiting, ( - ) Cough, ( - )Shortness of breath, ( - )Chest Pain    MEDICATIONS  (STANDING):  ascorbic acid 500 milliGRAM(s) Oral daily  aspirin  chewable 81 milliGRAM(s) Oral daily  atorvastatin 40 milliGRAM(s) Oral at bedtime  chlorhexidine 2% Cloths 1 Application(s) Topical <User Schedule>  escitalopram 5 milliGRAM(s) Oral daily  lactated ringers. 500 milliLiter(s) (75 mL/Hr) IV Continuous <Continuous>  metoprolol succinate ER 25 milliGRAM(s) Oral daily  multivitamin 1 Tablet(s) Oral daily  potassium chloride   Powder 40 milliEquivalent(s) Oral every 4 hours  sacubitril 24 mG/valsartan 26 mG 1 Tablet(s) Oral two times a day  senna 2 Tablet(s) Oral at bedtime    MEDICATIONS  (PRN):  LORazepam     Tablet 1 milliGRAM(s) Oral at bedtime PRN for anxiety          T(C): 36.4 (05-09 @ 04:00), Max: 37.2 (05-08 @ 20:37)   HR: 97   BP: 127/71   RR: 17   SpO2: 91%    PHYSICAL EXAM:    CONSTITUTIONAL: NAD, well-developed, well-groomed  EYES: PERRLA; conjunctiva and sclera clear  ENMT: Moist oral mucosa, no pharyngeal injection or exudates; normal dentition  NECK: Supple, no palpable masses; no thyromegaly  RESPIRATORY: Normal respiratory effort; lungs are clear to auscultation bilaterally  CARDIOVASCULAR: Regular rate and rhythm, normal S1 and S2, no murmur/rub/gallop; No lower extremity edema; Peripheral pulses are 2+ bilaterally  ABDOMEN: Nontender to palpation, normoactive bowel sounds, no rebound/guarding; No hepatosplenomegaly  MUSCULOSKELETAL:  no clubbing or cyanosis of digits; no joint swelling or tenderness to palpation  PSYCH: A+O to person, place, and time; affect appropriate  NEUROLOGY: CN 2-12 are intact and symmetric; no gross sensory deficits   SKIN: No rashes; no palpable lesions      LABS:     05-09    146<H>  |  103  |  26<H>  ----------------------------<  91  3.3<L>   |  27  |  0.58    Ca    8.6      09 May 2023 06:56  Phos  2.9     05-09  Mg     2.1     05-09         CAPILLARY BLOOD GLUCOSE          RADIOLOGY & ADDITIONAL TESTS:    Imaging Personally Reviewed:  Consultant(s) Notes Reviewed:    Care Discussed with Consultants/Other Providers:

## 2023-05-09 NOTE — PROVIDER CONTACT NOTE (OTHER) - REASON
Patient has DTI on sacrum
9 beats of WCT on tele
Patient had tele event
Patient lethargic
Pt had a total of 275ml & 1 incontinent count UO for the past 12 hours
low brown urine output  50ml for last 8hrs

## 2023-05-09 NOTE — PROVIDER CONTACT NOTE (OTHER) - BACKGROUND
Pt Dx: AHRF/CHF and lymphadenopathy
Patient admitted for acute respiratory failure, b/l pleural effusions, and acute on chronic HF.
Patient admitted with acute respiratory failure.
pt admitted for HF  , CP
Patient admitted for respiratory failure
pt admitted for chest pain

## 2023-05-09 NOTE — PROGRESS NOTE ADULT - SUBJECTIVE AND OBJECTIVE BOX
DATE OF SERVICE: 05-09-23 @ 10:47    Patient is a 88y old  Female who presents with a chief complaint of CP and SOB (08 May 2023 12:21)      INTERVAL HISTORY: Feels ok.     REVIEW OF SYSTEMS: limited participant but offers no complaints.   CONSTITUTIONAL: No weakness  EYES/ENT: No visual changes;  No throat pain   NECK: No pain or stiffness  RESPIRATORY: No cough, wheezing; No shortness of breath  CARDIOVASCULAR: No chest pain or palpitations  GASTROINTESTINAL: No abdominal  pain. No nausea, vomiting, or hematemesis  GENITOURINARY: No dysuria, frequency or hematuria  NEUROLOGICAL: No stroke like symptoms  SKIN: No rashes    TELEMETRY Personally reviewed: SR   	  MEDICATIONS:  metoprolol succinate ER 25 milliGRAM(s) Oral daily  sacubitril 24 mG/valsartan 26 mG 1 Tablet(s) Oral two times a day        PHYSICAL EXAM:  T(C): 36.4 (05-09-23 @ 04:00), Max: 37.2 (05-08-23 @ 20:37)  HR: 97 (05-09-23 @ 04:00) (88 - 105)  BP: 127/71 (05-09-23 @ 04:00) (103/64 - 127/71)  RR: 17 (05-09-23 @ 04:00) (17 - 18)  SpO2: 91% (05-09-23 @ 04:00) (91% - 94%)  Wt(kg): --  I&O's Summary    08 May 2023 07:01  -  09 May 2023 07:00  --------------------------------------------------------  IN: 1000 mL / OUT: 325 mL / NET: 675 mL    09 May 2023 07:01  -  09 May 2023 10:47  --------------------------------------------------------  IN: 75 mL / OUT: 0 mL / NET: 75 mL          Appearance: In no distress	  HEENT:    PERRL, EOMI	  Cardiovascular:  S1 S2, No JVD  Respiratory: Lungs clear to auscultation	  Gastrointestinal:  Soft, Non-tender, + BS	  Vascularature:  No edema of LE  Psychiatric: Appropriate affect   Neuro: no acute focal deficits           05-09    146<H>  |  103  |  26<H>  ----------------------------<  91  3.3<L>   |  27  |  0.58    Ca    8.6      09 May 2023 06:56  Phos  2.9     05-09  Mg     2.1     05-09          Labs personally reviewed      ASSESSMENT/PLAN: 	    87 y/o F  with a h/o HFrEF, LBBB, PE previously on xarelto (no longer), depression, HLD, Femoral neck fracture in January s/p L hip arthroplasty presenting with worsening chest pain and shortness of breath. After hemiarthroplasty in January. patient never recovered full strength. Continued to be deconditioned which has progressed to the last 2 weeks where she is essentially bed bound. Poor po intake but denies weight loss/night sweats. Denies dark stool. Over the past few weeks, has noted that movement is limited 2/2 to sob, improves with rest. Denies changes in diet/recent sick contacts/changes in medication. Over the last 2 days noted new onset cp with minimal exertion (movement for changing).       Problem/Plan - 1:  ·  Problem: Chronic Systolic HF  ·  Plan:   - [facility] hospitalization records obtained from November 24, 2022: Admitted there with chest pain, found to have as noted on CTA report "small volume PE in the left upper lobe segmental branches and ? RLL subsegmental branches"  - Trop noted to be 1597 but was never trended to peak, EKG there with LBBB consistent with MI. Found to have newly reduced EF last admission.   - BNP elevated ~9K  - CT Chest with small to moderate pleural effusions  - c/w ASA 81mg PO daily, Metoprolol and Entresto   - Now saturating wnl on room air.   - Agree with transition to Lasix 40mg PO three times per week.    Problem/Plan - 2:  ·  Problem: Chest Pain  ·  Plan: Patient reports progressive SOB and midsternal CP with minimal exertion for the past 2 weeks.   - Trop 51 --> 49 (lower than previous admission)  - EKG ST with LBBB (unchanged from baseline)  - CTA with no PE/Dissection but diffuse lymphadenopathy suggestive of malignancy  - c/w medical mgmt for presumed CAD with ASA, BB    Problem/Plan - 3:  ·  Problem: Pulmonary embolism.   ·  Plan:    - CTA with no PE noted  - LE US neg for DVT  - Can discontinue AC for now since there is no active indication  - c/w lovenox for DVT PPx    Problem/Plan - 4:  ·  Problem: Essential hypertension.   ·  Plan: BP overall acceptable   - c/w Metoprolol and Entresto  - c/w Lasix 40mg PO three times per week        Raysa Vásquez, LINA-NP   Clyde Anguiano DO [facility]  Cardiovascular Medicine  [address] [place] [address], [address]  Available through call or text on [employer]  Office: [phone]

## 2023-05-09 NOTE — PROVIDER CONTACT NOTE (OTHER) - SITUATION
Patient had first time tele event. PAT/PSVT for 4.3 seconds with -165
9 beats of WCT on tele
RN had difficulty waking pt from sleep in order to administer medications. Pt arrived on unit from ED around 1800 being AOx3-4 with intermittent forgetfulness. Pt was given po ativan 1856 for anxiety.
Patient admitted from the ED with a DTI on sacrum.
low brown urine output  50ml for last 8hrs
Pt had a total of 275ml & 1 incontinent count UO for the past 12 hours

## 2023-05-09 NOTE — DISCHARGE NOTE NURSING/CASE MANAGEMENT/SOCIAL WORK - PATIENT PORTAL LINK FT
You can access the FollowMyHealth Patient Portal offered by [facility] by registering at the following website: [email or web address]. By joining [employer]’s FollowMyHealth portal, you will also be able to view your health information using other applications (apps) compatible with our system.

## 2023-05-10 NOTE — PROGRESS NOTE ADULT - ASSESSMENT
88F PMH HFrEF, LBBB, PE previously on Xarelto (no longer), depression, HLD, Femoral neck fracture in January s/p L hip arthroplasty presenting with acute hypoxemic respiratory failure iso acute on chronic HF. Also found to have worsening diffuse lymphadenopathy. 
88F hx HFrEF, LBBB, PE previously on xarelto (no longer), depression, HLD, femoral neck fracture in January s/p L hip arthroplasty presenting with worsening chest pain and shortness of breath. Oncology consulted for lymphadenopathy concerning for malignancy.    #Lymphadenopathy  Patient had chest and axillary lymphadenopathy noted on CTA chest (ordered for dyspnea, found PE) in January 2023. She was treated for the PE with Xarelto but now presenting with chest pain and dyspnea.  - CTA abd/pelvis 5/3/23: Extensive thoracic and retroperitoneal lymphadenopathy, some of which is hyperdense in nature. The largest lymph node is seen in the right axilla measuring 4 x 2.6 cm. These have markedly increased since 1/11/2023 examination. Differential includes lymphoma, metastatic disease from hyperdense tumor, or densities such as Castleman's disease. Small-to-moderate bilateral pleural effusions with right middle and lower lobe atelectasis and partial left lower lobe atelectasis.  - Lung primary seems most likely but the differential is too broad. Ddx includes lymphoma as well.  - Patient and her daughter note that they would not want standard chemotherapy but would be amenable to discussing targeted therapies. However, the treatment options would depend on the diagnosis.   - Patient is DNR/DNI; would not want to be intubated even for a bronchoscopy  - s/p L.inguinal node core biopsy on 5/9/23, would keep patient 
88F PMH HFrEF, LBBB, PE previously on Xarelto (no longer), depression, HLD, Femoral neck fracture in January s/p L hip arthroplasty presenting with acute hypoxemic respiratory failure iso acute on chronic HF. Also found to have worsening diffuse lymphadenopathy
89 y/o F  with a h/o HFrEF, LBBB, PE previously on xarelto (no longer), depression, HLD, Femoral neck fracture in January s/p L hip arthroplasty presenting with acute hypoxemic respiratory failure iso acute on chronic HF. Also found to have worsening diffuse lymphadenopathy. 
88F PMH HFrEF, LBBB, PE previously on Xarelto (no longer), depression, HLD, Femoral neck fracture in January s/p L hip arthroplasty presenting with acute hypoxemic respiratory failure iso acute on chronic HF. Also found to have worsening diffuse lymphadenopathy
88F PMH HFrEF, LBBB, PE previously on Xarelto (no longer), depression, HLD, Femoral neck fracture in January s/p L hip arthroplasty presenting with acute hypoxemic respiratory failure iso acute on chronic HF. Also found to have worsening diffuse lymphadenopathy
89 y/o F  with a h/o HFrEF, LBBB, PE previously on xarelto (no longer), depression, HLD, Femoral neck fracture in January s/p L hip arthroplasty presenting with acute hypoxemic respiratory failure iso acute on chronic HF. Also found to have worsening diffuse lymphadenopathy. 
88F PMH HFrEF, LBBB, PE previously on Xarelto (no longer), depression, HLD, Femoral neck fracture in January s/p L hip arthroplasty presenting with acute hypoxemic respiratory failure iso acute on chronic HF. Also found to have worsening diffuse lymphadenopathy

## 2023-05-10 NOTE — PROGRESS NOTE ADULT - PROBLEM SELECTOR PROBLEM 1
Acute hypoxemic respiratory failure
Diffuse lymphadenopathy
Acute hypoxemic respiratory failure
Diffuse lymphadenopathy
Acute hypoxemic respiratory failure
Acute hypoxemic respiratory failure
Diffuse lymphadenopathy

## 2023-05-10 NOTE — PROGRESS NOTE ADULT - NUTRITIONAL ASSESSMENT
This patient has been assessed with a concern for Malnutrition and has been determined to have a diagnosis/diagnoses of Moderate protein-calorie malnutrition.    This patient is being managed with:   Diet Soft and Bite Sized-  Supplement Feeding Modality:  Oral  Ensure Plus High Protein Cans or Servings Per Day:  2       Frequency:  Daily  Entered: May  5 2023 12:53PM  

## 2023-05-10 NOTE — DISCHARGE NOTE PROVIDER - NSDCFUSCHEDAPPT_GEN_ALL_CORE_FT
Anthony Guerra  [facility]  Ricardo Latif  Scheduled Appointment: 05/30/2023

## 2023-05-10 NOTE — PROGRESS NOTE ADULT - SUBJECTIVE AND OBJECTIVE BOX
Turner Fisher MD PGY-4 Hematology Oncology  Reachable on TEAMS    INTERVAL HPI/OVERNIGHT EVENTS:  Patient S&E at bedside. No acute events, no active complaints    VITAL SIGNS:  T(F): 97.5 (05-10-23 @ 11:10)  HR: 96 (05-10-23 @ 11:10)  BP: 108/70 (05-10-23 @ 11:10)  RR: 19 (05-10-23 @ 11:10)  SpO2: 96% (05-10-23 @ 11:10)  Wt(kg): --    PHYSICAL EXAM:    Constitutional: NAD  Eyes: EOMI, sclera non-icteric  Neck: supple, no masses, no JVD  Respiratory: CTA b/l, good air entry b/l  Cardiovascular: RRR, no M/R/G  Gastrointestinal: soft, NTND, no masses palpable, + BS, no hepatosplenomegaly  Extremities: no c/c/e  Neurological: AAOx3      MEDICATIONS  (STANDING):  ascorbic acid 500 milliGRAM(s) Oral daily  aspirin  chewable 81 milliGRAM(s) Oral daily  atorvastatin 40 milliGRAM(s) Oral at bedtime  chlorhexidine 2% Cloths 1 Application(s) Topical <User Schedule>  escitalopram 5 milliGRAM(s) Oral daily  furosemide    Tablet 40 milliGRAM(s) Oral <User Schedule>  lactated ringers. 500 milliLiter(s) (75 mL/Hr) IV Continuous <Continuous>  metoprolol succinate ER 25 milliGRAM(s) Oral daily  multivitamin 1 Tablet(s) Oral daily  sacubitril 24 mG/valsartan 26 mG 1 Tablet(s) Oral two times a day  senna 2 Tablet(s) Oral at bedtime    MEDICATIONS  (PRN):  LORazepam     Tablet 1 milliGRAM(s) Oral at bedtime PRN for anxiety      Allergies    No Known Allergies    Intolerances        LABS:    05-10    147<H>  |  105  |  26<H>  ----------------------------<  79  3.5   |  26  |  0.54    Ca    8.7      10 May 2023 06:31  Phos  2.9     05-09  Mg     2.1     05-10            RADIOLOGY & ADDITIONAL TESTS:  Studies reviewed.

## 2023-05-10 NOTE — PROGRESS NOTE ADULT - PROBLEM SELECTOR PLAN 2
elevated proBNP compared to prior with pleural effusions and LE edema  -Continue IV diuresis as tolerated  -continue metoprolol succinate 25 mg PO daily  -continue entresto BID  -has TTE <6 months ago, EF 35%  -I/O to be monitored
elevated proBNP compared to prior with pleural effusions and LE edema  -Continue IV diuresis as tolerated  -continue metop 25 xr daily  -continue entresto BID  -has TTE <6 months ago, EF 35%  -Tele  -I/O
elevated proBNP compared to prior with pleural effusions and LE edema  -Continue IV diuresis as ordered  -continue metoprolol succinate 25 mg PO daily  -continue Entresto BID  -has TTE <6 months ago, EF 35%  -I/O to be monitored
resolved
resolved
elevated proBNP compared to prior with pleural effusions and LE edema  -Continue IV diuresis as tolerated  -continue metop 25 xr daily  -continue entresto BID  -has TTE <6 months ago, EF 35%  -Tele  -I/O
resolved

## 2023-05-10 NOTE — PROGRESS NOTE ADULT - PROBLEM SELECTOR PLAN 4
-continue escitalopram 5mg  -continue lorazepam 0.5mg qhs prn

## 2023-05-10 NOTE — PROGRESS NOTE ADULT - PROBLEM SELECTOR PLAN 6
DNR/DNI  Daughter health care proxy

## 2023-05-10 NOTE — PROGRESS NOTE ADULT - PROBLEM SELECTOR PLAN 3
extensive thoracic and retroperitoneal Lymphadenopathy compared to CT in January, largest in R axilla  -Etiology unclear possible metastatic disease vs inflammatory condition vs lymphoma  -Plan remains for biopsy with IR Mon/Tue
extensive thoracic and retroperitoneal Lymphadenopathy compared to CT in January, largest in R axilla  -Etiology unclear possiblett metastatic disease vs inflammatory condition vs lymphoma  -pt want a biopsy for further eval--> IR consult placed
extensive thoracic and retroperitoneal Lymphadenopathy compared to CT in January, largest in R axilla  -Etiology unclear possible metastatic disease vs inflammatory condition vs lymphoma  -Plan remains for biopsy with IR Mon/Tue
extensive thoracic and retroperitoneal Lymphadenopathy compared to CT in January, largest in R axilla  -Etiology unclear possiblett metastatic disease vs inflammatory condition vs lymphoma  -pt want a biopsy for further eval--> IR consult placed
resolved.  pt is now euvolemic with rising sodium.--> ?overdiuresis --> will lower diuretics to lasix 40mg PO Mon/Wed/Friday  -continue metoprolol succinate 25 mg PO daily  -continue Entresto BID  -has TTE <6 months ago, EF 35%  -I/O to be monitored

## 2023-05-10 NOTE — PROGRESS NOTE ADULT - PROBLEM SELECTOR PLAN 5
Lovenox
Lovenox (holding for biopsy tomorrow)
Lovenox
Lovenox (holding for biopsy tomorrow)

## 2023-05-10 NOTE — PROGRESS NOTE ADULT - NSPROGADDITIONALINFOA_GEN_ALL_CORE
daughter is now requesting to go home with hospice.  pt stable for discharge with Hospice.
Case d/w ACP on 6 tower
Case d/w ACP on 6 tower

## 2023-05-10 NOTE — PROGRESS NOTE ADULT - SUBJECTIVE AND OBJECTIVE BOX
Andre Reyes, M.D.  Pager: [phone]  Office: [phone]    Patient is a 88y old  Female who presents with a chief complaint of CP and SOB (10 May 2023 09:40)          SUBJECTIVE / OVERNIGHT EVENTS:    No acute overnight events.    ROS: ( - ) Fever, ( - )Chills,  ( - )Nausea/Vomiting, ( - ) Cough, ( - )Shortness of breath, ( - )Chest Pain    MEDICATIONS  (STANDING):  ascorbic acid 500 milliGRAM(s) Oral daily  aspirin  chewable 81 milliGRAM(s) Oral daily  atorvastatin 40 milliGRAM(s) Oral at bedtime  chlorhexidine 2% Cloths 1 Application(s) Topical <User Schedule>  escitalopram 5 milliGRAM(s) Oral daily  furosemide    Tablet 40 milliGRAM(s) Oral <User Schedule>  lactated ringers. 500 milliLiter(s) (75 mL/Hr) IV Continuous <Continuous>  metoprolol succinate ER 25 milliGRAM(s) Oral daily  multivitamin 1 Tablet(s) Oral daily  sacubitril 24 mG/valsartan 26 mG 1 Tablet(s) Oral two times a day  senna 2 Tablet(s) Oral at bedtime    MEDICATIONS  (PRN):  LORazepam     Tablet 1 milliGRAM(s) Oral at bedtime PRN for anxiety          T(C): 36.6 (05-10 @ 04:00), Max: 36.9 (05-09 @ 20:06)   HR: 100   BP: 123/79   RR: 18   SpO2: 92%    PHYSICAL EXAM:    CONSTITUTIONAL: NAD, well-developed, well-groomed  EYES: PERRLA; conjunctiva and sclera clear  ENMT: Moist oral mucosa, no pharyngeal injection or exudates; normal dentition  NECK: Supple, no palpable masses; no thyromegaly  RESPIRATORY: Normal respiratory effort; lungs are clear to auscultation bilaterally  CARDIOVASCULAR: Regular rate and rhythm, normal S1 and S2, no murmur/rub/gallop; No lower extremity edema; Peripheral pulses are 2+ bilaterally  ABDOMEN: Nontender to palpation, normoactive bowel sounds, no rebound/guarding; No hepatosplenomegaly  MUSCULOSKELETAL:  no clubbing or cyanosis of digits; no joint swelling or tenderness to palpation  PSYCH: A+O to person, place, and time; affect appropriate  NEUROLOGY: CN 2-12 are intact and symmetric; no gross sensory deficits   SKIN: No rashes; no palpable lesions      LABS:     05-10    147<H>  |  105  |  26<H>  ----------------------------<  79  3.5   |  26  |  0.54    Ca    8.7      10 May 2023 06:31  Phos  2.9     05-09  Mg     2.1     05-10         CAPILLARY BLOOD GLUCOSE          RADIOLOGY & ADDITIONAL TESTS:    Imaging Personally Reviewed:  Consultant(s) Notes Reviewed:    Care Discussed with Consultants/Other Providers:   Andre Reyes, M.D.  Pager: [phone]  Office: [phone]    Patient is a 88y old  Female who presents with a chief complaint of CP and SOB (10 May 2023 09:40)          SUBJECTIVE / OVERNIGHT EVENTS:    No acute overnight events.    MEDICATIONS  (STANDING):  ascorbic acid 500 milliGRAM(s) Oral daily  aspirin  chewable 81 milliGRAM(s) Oral daily  atorvastatin 40 milliGRAM(s) Oral at bedtime  chlorhexidine 2% Cloths 1 Application(s) Topical <User Schedule>  escitalopram 5 milliGRAM(s) Oral daily  furosemide    Tablet 40 milliGRAM(s) Oral <User Schedule>  lactated ringers. 500 milliLiter(s) (75 mL/Hr) IV Continuous <Continuous>  metoprolol succinate ER 25 milliGRAM(s) Oral daily  multivitamin 1 Tablet(s) Oral daily  sacubitril 24 mG/valsartan 26 mG 1 Tablet(s) Oral two times a day  senna 2 Tablet(s) Oral at bedtime    MEDICATIONS  (PRN):  LORazepam     Tablet 1 milliGRAM(s) Oral at bedtime PRN for anxiety          T(C): 36.6 (05-10 @ 04:00), Max: 36.9 (05-09 @ 20:06)   HR: 100   BP: 123/79   RR: 18   SpO2: 92%    PHYSICAL EXAM:    CONSTITUTIONAL: NAD, well-developed, well-groomed  EYES: PERRLA; conjunctiva and sclera clear  ENMT: Moist oral mucosa, no pharyngeal injection or exudates; normal dentition  NECK: Supple, no palpable masses; no thyromegaly  RESPIRATORY: Normal respiratory effort; lungs are clear to auscultation bilaterally  CARDIOVASCULAR: Regular rate and rhythm, normal S1 and S2, no murmur/rub/gallop; trace lower extremity edema; Peripheral pulses are 2+ bilaterally  ABDOMEN: Nontender to palpation, normoactive bowel sounds, no rebound/guarding; No hepatosplenomegaly  MUSCULOSKELETAL:  no clubbing or cyanosis of digits; no joint swelling or tenderness to palpation  PSYCH: A+O to person, place, and time; affect appropriate  NEUROLOGY: CN 2-12 are intact and symmetric; no gross sensory deficits   SKIN: No rashes; no palpable lesions      LABS:     05-10    147<H>  |  105  |  26<H>  ----------------------------<  79  3.5   |  26  |  0.54    Ca    8.7      10 May 2023 06:31  Phos  2.9     05-09  Mg     2.1     05-10         CAPILLARY BLOOD GLUCOSE          RADIOLOGY & ADDITIONAL TESTS:    Imaging Personally Reviewed:  Consultant(s) Notes Reviewed:    Care Discussed with Consultants/Other Providers:

## 2023-05-10 NOTE — PROGRESS NOTE ADULT - PROVIDER SPECIALTY LIST ADULT
Cardiology
Hospitalist
Hospitalist
Heme/Onc
Hospitalist
Internal Medicine
Internal Medicine

## 2023-05-10 NOTE — PROGRESS NOTE ADULT - PROBLEM SELECTOR PROBLEM 3
Diffuse lymphadenopathy
Diffuse lymphadenopathy
Acute on chronic HFrEF (heart failure with reduced ejection fraction)
Diffuse lymphadenopathy
Acute on chronic HFrEF (heart failure with reduced ejection fraction)
Diffuse lymphadenopathy
Acute on chronic HFrEF (heart failure with reduced ejection fraction)

## 2023-05-10 NOTE — DISCHARGE NOTE PROVIDER - NSDCMRMEDTOKEN_GEN_ALL_CORE_FT
ascorbic acid 500 mg oral tablet: 1 tab(s) orally once a day  aspirin 81 mg oral tablet, chewable: 1 tab(s) orally once a day  atorvastatin 40 mg oral tablet: 1 tab(s) orally once a day (at bedtime)  escitalopram 5 mg oral tablet: 1 tab(s) orally once a day  furosemide 40 mg oral tablet: 1 tab(s) orally 3 times a week  LORazepam 0.5 mg oral tablet: 1 orally once a day (at bedtime) as needed for  anxiety  metoprolol succinate 25 mg oral tablet, extended release: 1 tab(s) orally once a day  Multiple Vitamins oral tablet: 1 tab(s) orally once a day  sacubitril-valsartan 24 mg-26 mg oral tablet: 1 tab(s) orally 2 times a day  senna leaf extract oral tablet: 2 tab(s) orally once a day (at bedtime)

## 2023-05-10 NOTE — DISCHARGE NOTE PROVIDER - CARE PROVIDER_API CALL
Cheryl Lopez  INTERNAL MEDICINE  [address], [address]  [place] [address]  Phone: [phone]  Fax: [phone]  Follow Up Time: 1 week

## 2023-05-10 NOTE — DISCHARGE NOTE PROVIDER - NSDCCPCAREPLAN_GEN_ALL_CORE_FT
PRINCIPAL DISCHARGE DIAGNOSIS  Diagnosis: Chronic CHF  Assessment and Plan of Treatment:   - BNP elevated ~9K  - CT Chest with small to moderate pleural effusions  - continue on aspirin 81mg daily, metoprolol and Entresto   - transition to Lasix 40mg three times per week  - continue supplemental oxygen as needed      SECONDARY DISCHARGE DIAGNOSES  Diagnosis: Hypertension  Assessment and Plan of Treatment:   - continue on aspirin 81mg daily, metoprolol and Entresto   - transition to Lasix 40mg three times per week    Diagnosis: Diffuse lymphadenopathy  Assessment and Plan of Treatment: - CTA with no PE/Dissection but diffuse lymphadenopathy  - s/p left inguinal lymph node biopsy on 5/9.

## 2023-05-10 NOTE — PROGRESS NOTE ADULT - PROBLEM SELECTOR PROBLEM 2
Acute hypoxemic respiratory failure
Acute on chronic HFrEF (heart failure with reduced ejection fraction)
Acute hypoxemic respiratory failure
Acute hypoxemic respiratory failure

## 2023-05-10 NOTE — PROGRESS NOTE ADULT - PROBLEM SELECTOR PLAN 1
2/2 to pleural effusions i/s/o HF exacerbation  - breathing improved with diuresis  - wean off O2 as tolerated
2/2 to pleural effusions iso HF exacerbation  - breathing improved with diuresis  - wean off O2 as tollerated
extensive thoracic and retroperitoneal Lymphadenopathy compared to CT in January, largest in R axilla  -Etiology unclear possible metastatic disease vs inflammatory condition vs lymphoma  -Plan remains for biopsy with IR on Tuesday 5/9.
2/2 to pleural effusions iso HF exacerbation  - breathing improved with diuresis
extensive thoracic and retroperitoneal Lymphadenopathy compared to CT in January, largest in R axilla  -Etiology unclear possible metastatic disease vs inflammatory condition vs lymphoma  -Plan remains for biopsy with IR today, Tuesday 5/9.
2/2 to pleural effusions iso HF exacerbation  - breathing improved with diuresis
extensive thoracic and retroperitoneal Lymphadenopathy compared to CT in January, largest in R axilla  -Etiology unclear possible metastatic disease vs inflammatory condition vs lymphoma  -s/p IR biopsy
pt exercises  daily ; sit ups, jumping jacks; pt climbs ladders ; stairs

## 2023-05-10 NOTE — PROGRESS NOTE ADULT - PROBLEM SELECTOR PROBLEM 7
R/O Cerebrovascular accident (CVA)

## 2023-05-10 NOTE — PROGRESS NOTE ADULT - SUBJECTIVE AND OBJECTIVE BOX
DATE OF SERVICE: 05-10-23 @ 09:41    Patient is a 88y old  Female who presents with a chief complaint of CP and SOB (09 May 2023 10:49)      INTERVAL HISTORY: AOx1, unable to engage in meaningful conversations. Daughter at bedside     REVIEW OF SYSTEMS:  CONSTITUTIONAL: No weakness  EYES/ENT: No visual changes;  No throat pain   NECK: No pain or stiffness  RESPIRATORY: No cough, wheezing; No shortness of breath  CARDIOVASCULAR: No chest pain or palpitations  GASTROINTESTINAL: No abdominal  pain. No nausea, vomiting, or hematemesis  GENITOURINARY: No dysuria, frequency or hematuria  NEUROLOGICAL: No stroke like symptoms  SKIN: No rashes      MEDICATIONS:  furosemide    Tablet 40 milliGRAM(s) Oral <User Schedule>  metoprolol succinate ER 25 milliGRAM(s) Oral daily  sacubitril 24 mG/valsartan 26 mG 1 Tablet(s) Oral two times a day        PHYSICAL EXAM:  T(C): 36.6 (05-10-23 @ 04:00), Max: 36.9 (05-09-23 @ 20:06)  HR: 100 (05-10-23 @ 04:00) (92 - 103)  BP: 123/79 (05-10-23 @ 04:00) (106/59 - 123/79)  RR: 18 (05-10-23 @ 04:00) (18 - 19)  SpO2: 92% (05-10-23 @ 04:00) (86% - 95%)  Wt(kg): --  I&O's Summary    09 May 2023 07:01  -  10 May 2023 07:00  --------------------------------------------------------  IN: 135 mL / OUT: 0 mL / NET: 135 mL      Height (cm): 172.7 (05-09 @ 13:40)  Weight (kg): 70.2 (05-09 @ 13:40)  BMI (kg/m2): 23.5 (05-09 @ 13:40)  BSA (m2): 1.83 (05-09 @ 13:40)    Appearance: In no distress	  HEENT:    PERRL, EOMI	  Cardiovascular:  S1 S2, No JVD  Respiratory: Lungs clear to auscultation	  Gastrointestinal:  Soft, Non-tender, + BS	  Vascularature:  No edema of LE  Psychiatric: Appropriate affect   Neuro: no acute focal deficits           05-10    147<H>  |  105  |  26<H>  ----------------------------<  79  3.5   |  26  |  0.54    Ca    8.7      10 May 2023 06:31  Phos  2.9     05-09  Mg     2.1     05-10          Labs personally reviewed      ASSESSMENT/PLAN: 	  89 y/o F  with a h/o HFrEF, LBBB, PE previously on xarelto (no longer), depression, HLD, Femoral neck fracture in January s/p L hip arthroplasty presenting with worsening chest pain and shortness of breath. After hemiarthroplasty in January. patient never recovered full strength. Continued to be deconditioned which has progressed to the last 2 weeks where she is essentially bed bound. Poor po intake but denies weight loss/night sweats. Denies dark stool. Over the past few weeks, has noted that movement is limited 2/2 to sob, improves with rest. Denies changes in diet/recent sick contacts/changes in medication. Over the last 2 days noted new onset cp with minimal exertion (movement for changing).       Problem/Plan - 1:  ·  Problem: Chronic Systolic HF  ·  Plan:   - [facility] hospitalization records obtained from November 24, 2022: Admitted there with chest pain, found to have as noted on CTA report "small volume PE in the left upper lobe segmental branches and ? RLL subsegmental branches"  - Trop noted to be 1597 but was never trended to peak, EKG there with LBBB consistent with MI. Found to have newly reduced EF last admission.   - BNP elevated ~9K  - CT Chest with small to moderate pleural effusions  - c/w ASA 81mg PO daily, Metoprolol and Entresto   - Now saturating wnl on room air.   - Agree with transition to Lasix 40mg PO three times per week.    Problem/Plan - 2:  ·  Problem: Chest Pain  ·  Plan: Patient reports progressive SOB and midsternal CP with minimal exertion for the past 2 weeks.   - Trop 51 --> 49 (lower than previous admission)  - EKG ST with LBBB (unchanged from baseline)  - CTA with no PE/Dissection but diffuse lymphadenopathy suggestive of malignancy  - c/w medical mgmt for presumed CAD with ASA, BB    Problem/Plan - 3:  ·  Problem: Pulmonary embolism.   ·  Plan:    - CTA with no PE noted  - LE US neg for DVT  - Can discontinue AC for now since there is no active indication  - c/w lovenox for DVT PPx    Problem/Plan - 4:  ·  Problem: Essential hypertension.   ·  Plan: BP overall acceptable   - c/w Metoprolol and Entresto  - c/w Lasix 40mg PO three times per week    Problem/Plan - 5:  ·  Problem: Diffuse lymphadenopathy.   ·  Plan: extensive thoracic and retroperitoneal Lymphadenopathy compared to CT in January, largest in R axilla  -Etiology unclear possible metastatic disease vs inflammatory condition vs lymphoma  -S/p biopsy on Tuesday 5/9.          MEGAN Johns, DO [facility]  Cardiovascular Medicine  [address], [address]  Available through call or text on [employer] TEAMs  Office: [phone]

## 2023-05-10 NOTE — PROGRESS NOTE ADULT - REASON FOR ADMISSION
CP and SOB

## 2023-05-10 NOTE — DISCHARGE NOTE PROVIDER - HOSPITAL COURSE
88F PMH HFrEF, LBBB, PE previously on Xarelto (no longer), depression, HLD, Femoral neck fracture in January s/p L hip arthroplasty presenting with acute hypoxemic respiratory failure iso acute on chronic HF. Also found to have worsening diffuse lymphadenopathy         Nutritional Assessment:  · Nutritional Assessment	This patient has been assessed with a concern for Malnutrition and has been determined to have a diagnosis/diagnoses of Moderate protein-calorie malnutrition.    This patient is being managed with:   Diet Soft and Bite Sized-  Supplement Feeding Modality:  Oral  Ensure Plus High Protein Cans or Servings Per Day:  2       Frequency:  Daily  Entered: May  5 2023 12:53PM     Problem/Plan - 1:  ·  Problem: Diffuse lymphadenopathy.   ·  Plan: extensive thoracic and retroperitoneal Lymphadenopathy compared to CT in January, largest in R axilla  -Etiology unclear possible metastatic disease vs inflammatory condition vs lymphoma  -Plan remains for biopsy with IR today, Tuesday 5/9.     Problem/Plan - 2:  ·  Problem: Acute hypoxemic respiratory failure.   ·  Plan: resolved.     Problem/Plan - 3:  ·  Problem: Acute on chronic HFrEF (heart failure with reduced ejection fraction).   ·  Plan: resolved.  pt is now euvolemic with rising sodium.--> ?overdiuresis --> will lower diuretics to lasix 40mg PO Mon/Wed/Friday  -continue metoprolol succinate 25 mg PO daily  -continue Entresto BID  -has TTE <6 months ago, EF 35%  -I/O to be monitored.     Problem/Plan - 4:  ·  Problem: Anxiety and depression.   ·  Plan: -continue escitalopram 5mg  -continue lorazepam 0.5mg qhs prn.     Problem/Plan - 5:  ·  Problem: Prophylactic measure.   ·  Plan: Lovenox (holding for biopsy tomorrow).     Problem/Plan - 6:  ·  Problem: Advance care planning.   ·  Plan: DNR/DNI  Daughter health care proxy.     Problem/Plan - 7:  ·  Problem: R/O Cerebrovascular accident (CVA).   ·  Plan: Patient and her daughter are refusing to undergo and MRI of the brain given her claustrophobia.   - Atorvastatin 40 mg PO Qd  - ASA 81 mg PO Qd.    s/p left inguinal lymph node biopsy on 5/9.  Patient opting for discharge home with hospice, will need home oxygen due to chronic CHF.

## 2023-05-17 NOTE — ED PROVIDER NOTE - NSFOLLOWUPINSTRUCTIONS_ED_ALL_ED_FT
patient should follow up with her primary care physician following today's visit    bring a copy of all results to your follow up appointments     give Tylenol as needed for pain.    if symptoms worsen, persist, or if any new symptoms develop, or if patient experiences any signs of distress, return to the ER right away.

## 2023-05-17 NOTE — ED ADULT TRIAGE NOTE - NSWEIGHTCALCTOOLDRUG_GEN_A_CORE
Pt arrived to ED c/o left eye pain. Pt took out contact and eye has gotten more irritated throughout the night.     used

## 2023-05-17 NOTE — ED CLERICAL - NS ED CLERK NOTE PRE-ARRIVAL INFORMATION; ADDITIONAL PRE-ARRIVAL INFORMATION
CC/Reason For referral: hx hypertensive heart disease, found this am with jaw locked. Unable to take po  Preferred Consultant(if applicable): na  Who admits for you (if needed): na  Do you have documents you would like to fax over? yes  Would you still like to speak to an ED attending? if needed

## 2023-05-17 NOTE — ED PROVIDER NOTE - ATTENDING APP SHARED VISIT CONTRIBUTION OF CARE
Patient is an 88-year-old female with a history of heart failure, PE, previously on Xarelto, hyperlipidemia, depression, history of femoral neck fracture in January status post left hip arthroplasty, on home hospice BIBEMS for jaw dislocation.  Patient's daughter is at bedside.  Daughter states that when patient woke up this morning the jaw was dislocated.  This is happened in the past as well.  Patient likely yawned and dislocated her jaw.  No known falls,.  Patient is able to point to her jaw and complained of pain.  VS noted  Gen. no acute distress, elderly, chronically ill, non-toxic  HEENT: EOMI, mmm, jaw open, anterior dislocation on right  Lungs: CTAB/L no C/ W /R   CVS: RRR   Abd; Soft non tender, non distended   Ext: no edema  Skin: no rash  Neuro AAOx3 non focal clear speech  a/p: jaw dislocation - multiple attempts to reduce jaw were unsuccessful. Plan for imaging with XRAY and OMFS consult.   - Robby CANDELARIO

## 2023-05-17 NOTE — ED ADULT NURSE NOTE - OBJECTIVE STATEMENT
88Y F awake and alert unable to speak due to jaw injury, PMH heart failure and PSH of hip surgery presented to the ED via EMS c/o jaw injury this morning. Pt 's daughter is at bedside and states that mother is at hospice at home. Pt's daughter states pt "probably yawned this morning" and "dislocated her jaw". Pt's daughter states a similar episode happened and the jaw was placed back into place in the ED. Pt's daughter states hospice nurse advised for pt to come to the ED. Pt's daughter states was given prescribed morphine for pain at 10 am this morning. Upon arrival to the ED, the pt is well appearing, has bilateral, even and unlabored chest rise, and patent airway. Upon assessment, pt has even and bilateral peripheral pulses, ROM, and soft, non-distended, non-tender abdomen. A sacral pressure ulcer noted, dressing is clean dry and intact. The pt has her mouth open and is unable to close mouth. Pt's daughter denies that pt has fevers, chest pain, SOB, n/v/d, urinary symptoms, and black or bloody stools. Comfort and safety provided, bed in lowest position, call bell within reach, and side rails up.

## 2023-05-17 NOTE — ED PROVIDER NOTE - PATIENT PORTAL LINK FT
You can access the FollowMyHealth Patient Portal offered by [facility] by registering at the following website: [email or web address]. By joining [facility]’s FollowMyHealth portal, you will also be able to view your health information using other applications (apps) compatible with our system.

## 2023-05-17 NOTE — ED PROVIDER NOTE - PHYSICAL EXAMINATION
HENT: Mouth is open, unable to close. Jaw appears anteriorly displaced to right with +ttp around R TMJ.

## 2023-05-17 NOTE — CHART NOTE - NSCHARTNOTEFT_GEN_A_CORE
EMERGENCY ROOM - Social work was verbally consulted by ED MD to assist with speaking to patient’s daughter regarding patient’s hospice care services. LMSW reviewed patient’s chart currently in the ED. As per chart review, “ 89 yo female PMHx HFrEF, LBBB, PE previously on xarelto (no longer), depression, HLD, Femoral neck fracture in January s/p L hip arthroplasty, on home hospice BIBEMS w/ daughter for jaw pain. Daughter reports when they woke up this morning noted jaw appeared dislocated (daughter states it's been dislocated before in the past). Thinks pt probably yawned in the morning and dislocated it. No fall, pt was in bed this AM when daughter noticed it. Pt pointing to jaw c/o pain. No reported trauma/fall.”    LMSW followed up with patient’s daughter at bedside, Smiley Schmid ph: [phone] -introduced self and role. Daughter verbalized and acknowledged understanding of role. Patient’s daughter verbalized patient was recently discharged from the hospital and connected to [facility] care at home. Daughter expressed her frustrations that the agency is short staffed with HHA-which resulted in patient coming to the ED. LMSW provided active listening and emotional support during consult. Patient’s daughter had previously inquired with ED MD regarding PCU. LMSW explained [facility] would need to be consulted, which would result in patient waiting in the ED and they may not have availability on unit. Daughter verbalized understanding of the information provided to her. Daughter expressed that she is ultimately upset with the lack of assistance. LMSW provided daughter with additional HHA resources to call for additional HHA services if she wishes. Daughter was thankful for the information provided to her and reports she will follow up. ED MD made aware of consultation. Social work will remain available.

## 2023-05-17 NOTE — ED PROVIDER NOTE - CLINICAL SUMMARY MEDICAL DECISION MAKING FREE TEXT BOX
Robby CANDELARIO: jaw dislocation - On exam, patient has jaw open, with obvious dislocation on right side. Multiple attempts to reduce jaw were unsuccessful. Plan for imaging with XRAY and OMFS consult.    OMFS saw patient and were able to reduce jaw. See consult note.

## 2023-05-17 NOTE — ED CLERICAL - NS ED CARE COORDINATION INFORMATION
This patient is enrolled in the Heart Failure STARS readmission reduction initiative and has active care navigation. This patient can be followed up by the care navigation team within 24 hours.  To arrange close follow-up or to obtain additional clinical information, please call the contact number above.   For patients followed by the NS cardiology heart failure team, please call the on call cardiomyopathy attending ([phone]) for ALL PATIENTS PRIOR to decision for admission or observation.   Consider CDU for management of CHF exacerbations per guidelines.

## 2023-05-17 NOTE — ED PROVIDER NOTE - OBJECTIVE STATEMENT
87 yo female PMHx HFrEF, LBBB, PE previously on xarelto (no longer), depression, HLD, Femoral neck fracture in January s/p L hip arthroplasty, on home hospice BIBEMS w/ daughter for jaw pain. Daughter reports when they woke up this morning noted jaw appeared dislocated (daughter states it's been dislocated before in the past). Thinks pt probably yawned in the morning and dislocated it. No fall, pt was in bed this AM when daughter noticed it. Pt pointing to jaw c/o pain. No reported trauma/fall.

## 2023-05-17 NOTE — ED ADULT NURSE NOTE - NSFALLHARMRISKINTERV_ED_ALL_ED

## 2023-05-17 NOTE — ED PROVIDER NOTE - PROGRESS NOTE DETAILS
Mich Serrano PA-C: imaging reviewed. spoke with OMFS, awaiting eval. Mich Serrano PA-C: seen by OMFS. jaw back in place. results of CT reviewed with patient daughter. states would like to take patient home so she can rest at home. strict return precautions discussed. stable for discharge.

## 2023-05-18 NOTE — CONSULT NOTE ADULT - SUBJECTIVE AND OBJECTIVE BOX
Per chart:    "87 yo female PMHx HFrEF, LBBB, PE previously on xarelto (no longer), depression, HLD, Femoral neck fracture in January s/p L hip arthroplasty, on home hospice BIBEMS w/ daughter for jaw pain. Daughter reports when they woke up this morning noted jaw appeared dislocated (daughter states it's been dislocated before in the past). Thinks pt probably yawned in the morning and dislocated it. No fall, pt was in bed this AM when daughter noticed it. Pt pointing to jaw c/o pain. No reported trauma/"    Physical Exam:   Gen: AAOx1  Head: Normocephalic, atraumatic  Eyes: EOMI, PERRL  Ears: Gross hearing intact,  Nose: No septal hematoma/asymmetry, no epistaxis bilaterally  Throat: No LAD, supple, trachea midline  Extraoral/Intraoral Exam: MICHELLE: MICHELLE ~30 mm, no excursive or protrusive movements possible of mandible, dentition grossly intact, no intraoral bleeding appreciated    Vitals:   Vital Signs Last 24 Hrs  T(C): 36.3 (17 May 2023 23:32), Max: 36.7 (17 May 2023 16:45)  T(F): 97.3 (17 May 2023 23:32), Max: 98.1 (17 May 2023 16:45)  HR: 104 (17 May 2023 23:32) (104 - 107)  BP: 100/60 (17 May 2023 23:32) (96/64 - 100/69)  BP(mean): --  RR: 18 (17 May 2023 23:32) (18 - 20)  SpO2: 94% (17 May 2023 23:32) (88% - 94%)    Parameters below as of 17 May 2023 23:32  Patient On (Oxygen Delivery Method): nasal cannula    CT Facial Bones:  CT MAXILLOFACIAL:  Complete anteromedial dislocation of the left condylar head of the mandible, with additional anterior displacement of the right condylar head within the mandibular fossa. No mandibular fracture or joint space hematoma identified.

## 2023-05-18 NOTE — CONSULT NOTE ADULT - ASSESSMENT
A: 88F w/ h/o dementia s/p yawning sustaining b/l TMJ dislocation, now s/p bedside reduction via bimanual manipulation without complication    P:  -Full liquid diet x 1 week  -Kerlex jaw compression dressing applied  -F/u w/ OMFS as outpatient in 1 week or sooner PRN, call [phone] to make an appt    [phone] [place] [address]    Louie Matthews DDS  Oral and Maxillofacial Surgery  Available on [employer] Teams

## 2023-05-24 NOTE — PROGRESS NOTE ADULT - PROBLEM SELECTOR PLAN 7
"[facility]   Physical Therapy Treatment Note     Name: Eyad Rodriguez  Clinic Number: [id number]    Therapy Diagnosis:   Encounter Diagnoses   Name Primary?    Decreased ROM of left knee Yes    Decreased strength of lower extremity     Decreased mobility and endurance            Physician: Shane Arreola MD    Visit Date: 5/25/2023    Physician Orders: PT Eval and Treat   Medical Diagnosis from Referral: Z96.652 (ICD-10-CM) - Status post left knee replacement  Evaluation Date: 5/1/2023  Authorization Period Expiration: 04/11/2024  Plan of Care Expiration: 8/1/23  Progress Note Due: 6/1/23  Visit # / Visits authorized: 7/20 (+eval)  FOTO: 1/3     Precautions: Standard, Diabetes, and Hx of heart failure, anxiety,      PTA Visit #: 0/5     Time In: 1038  Time Out: 1138  Total Billable Time: 15 minutes 1:1    SUBJECTIVE     Pt reports: He was sore after last time but he feels like he worked it out good. He took some pain medication this morning. He wants to be able to go on vacation to [place] next month to go fishing.   He was compliant with home exercise program.  Response to previous treatment: sore  Functional change: ongoing    Pain: 7/10  Location: left knee      OBJECTIVE     Objective Measures updated at progress report unless specified.     5/25/23:  Knee extension Left 0 degrees     Treatment     Eyad received the treatments listed below:      therapeutic exercises to develop strength, endurance, and ROM for 25  minutes including:  Heel prop 5# above and below 5'   Straight leg raise 3x10   Long arc quad 5" 3x10 w/ +3#  Hamstring curl w/ +green band +3x10  heel raises 2x10 edge of step  Single leg shuttle 2 black x20   upright bike 8' resist 4.0     NP:Heel slides 5'   manual therapy techniques: Joint mobilizations and Soft tissue Mobilization were applied to the: L knee for 10  minutes, including:  Overpressure flexion and extension holds  Patellar mobs    neuromuscular "
"re-education activities to improve: Coordination, Proprioception, and Posture for 15 minutes. The following activities were included:  Quad set with heel prop 5" x30   Short arc quad 5" 3x10 w/ +3#  Long arc quad 5" 3x10 w/ +3#    therapeutic activities activities to improve: functional independence for 10 minutes. The following activities were included:  Sit to stand TKE green band +3x10  Step ups 6" stair x10 w/ handrail x10 w/o  Side step ups 6" x15        Patient Education and Home Exercises     Home Exercises Provided and Patient Education Provided     Education provided:   - Home exercise program         Written Home Exercises Provided: yes. Exercises were reviewed and Eyad was able to demonstrate them prior to the end of the session.  Eyad demonstrated good  understanding of the education provided. See EMR under Patient Instructions for exercises provided during therapy sessions.       ASSESSMENT     Patient continues to lack full extension that improves with manual and exercises. He requires min verbal and tactile cues to properly recruit quad for quad set and decrease in posterior chain recruitment. Patient was challenged with all functional strengthening exercises.     Eyad Is progressing well towards his goals.   Pt prognosis is Good.     Pt will continue to benefit from skilled outpatient physical therapy to address the deficits listed in the problem list box on initial evaluation, provide pt/family education and to maximize pt's level of independence in the home and community environment.     Pt's spiritual, cultural and educational needs considered and pt agreeable to plan of care and goals.     Anticipated barriers to physical therapy: co-morbidities     Goals: Short Term GOALS: 4 weeks. Pt agrees with goals set.  1. Patient demonstrates independence with HEP.  Progressing, not met  2.Patient will demonstrate 0 degrees of knee extension to improve gait mechanics Progressing, not met  3. Patient "
demonstrates ability to perform Timed up and Go without AD in 12 seconds or less to improve mobility and decrease risk for falls.  Progressing, not met  4. Patient demonstrates ability to perform 17 sit to stand with 30 second sit to stand test to improve functional strength and mobility. Progressing, not met     Long Term GOALS: 8 weeks. Pt agrees with goals set.  1. Patient demonstrates increased Left knee flexion  to 115 degrees to improve tolerance to functional activities pain free.  Progressing, not met  2. Patient demonstrates increased strength BLE's to 4+/5 or greater to improve tolerance to functional activities pain free. Progressing, not met  3. Patient demonstrates improved overall function per FOTO Knee Survey to 20% Limitation or less. Progressing, not met  4. Patient will be able to walk 1 mile with little to no difficulty to improve tolerance to functional tasks pain free. Progressing, not met    PLAN     Increased knee mobility for functional use and ambulation without AD. Increase lower extremity strength and stability.     Janette Lange, PT,DPT  05/25/2023                    
Patient and her daughter are refusing to undergo and MRI of the brain given her claustrophobia.   - Atorvastatin 40 mg PO Qd  - ASA 81 mg PO Qd
I'm concerned for a CVA but the patient and her daughter are refusing to undergo and MRI of the brain given her claustrophobia. For now will start the patient on statin. I will continue discussion surrounding CVA as this could be either small ischemic CVA versus metastatic reason for her symptoms.
Patient and her daughter are refusing to undergo and MRI of the brain given her claustrophobia.   - Atorvastatin 40 mg PO Qd  - ASA 81 mg PO Qd
Patient and her daughter are refusing to undergo and MRI of the brain given her claustrophobia.   - Atorvastatin 40 mg PO Qd  - ASA 81 mg PO Qd

## 2023-12-08 NOTE — DISCHARGE NOTE ADULT - PLAN OF CARE
CSW attempted to make contact with pt's mother, Day Watkins, [phone] but was unable to reach. CSW will re attempt at later date.    Symptoms resolved Please follow up with  in 1-2 weeks  Echo to be completed outpatient Please continue medications as prescribed Please continue all medications as prescribed

## 2023-12-10 NOTE — ED PROVIDER NOTE - NS ED MD DISPO DIVISION
Bed/Stretcher in lowest position, wheels locked, appropriate side rails in place/Call bell, personal items and telephone in reach/Instruct patient to call for assistance before getting out of bed/chair/stretcher/Non-slip footwear applied when patient is off stretcher/[place] to call system/Physically safe environment - no spills, clutter or unnecessary equipment/Purposeful proactive rounding/Room/bathroom lighting operational, light cord in reach Bed/Stretcher in lowest position, wheels locked, appropriate side rails in place/Call bell, personal items and telephone in reach/Instruct patient to call for assistance before getting out of bed/chair/stretcher/Non-slip footwear applied when patient is off stretcher/[place] to call system/Physically safe environment - no spills, clutter or unnecessary equipment/Purposeful proactive rounding/Room/bathroom lighting operational, light cord in reach [facility]

## 2024-02-12 NOTE — ED PROVIDER NOTE - MDM ORDERS SUBMITTED SELECTION
Chief Complaint   Patient presents with    Port Draw     Labs drawn via port by RN in lab. VS taken.      Labs drawn via Port accessed using 20g flat needle. Unable to leave urine sample. Line flushed and Heparin locked. Vital signs taken. Checked into next appointment.     Jalyn Torres RN     Imaging Studies/Medications

## 2024-04-05 NOTE — DISCHARGE NOTE PROVIDER - EXTENDED VTE YES NO FOR MLM ENOXAPARIN
Instructions.\"  Current as of: November 16, 2023               Content Version: 14.0  © 5403-3330 [employer].   Care instructions adapted under license by [employer]. If you have questions about a medical condition or this instruction, always ask your healthcare professional. [employer] disclaims any warranty or liability for your use of this information.           Starting a Weight Loss Plan: Care Instructions  Overview     It can be a challenge to lose weight. But your doctor can help you make a weight-loss plan that meets your needs.  You don't have to make a lot of big changes at once. A better idea might be to focus on small changes and stick with them. When those changes become habit, you can add a few more changes.  Some people find it helpful to take an exercise or nutrition class. If you have questions, ask your doctor about seeing a registered dietitian or an exercise specialist. You might also think about joining a weight-loss support group.  If you're not ready to make changes right now, try to pick a date in the future. Then make an appointment with your doctor to talk about when and how you'll get started with a plan.  Follow-up care is a key part of your treatment and safety. Be sure to make and go to all appointments, and call your doctor if you are having problems. It's also a good idea to know your test results and keep a list of the medicines you take.  How can you care for yourself at home?  Set realistic goals. Many people expect to lose much more weight than is likely. A weight loss of 5% to 10% of your body weight may be enough to improve your health.  Get family and friends involved to provide support. Talk to them about why you are trying to lose weight, and ask them to help. They can help by participating in exercise and having meals with you, even if they may be eating something different.  Find what works best for you. If you do not have time or do not like  ,

## 2024-04-05 NOTE — ED ADULT NURSE NOTE - CAS DISCH BELONGINGS RETURNED
[FreeTextEntry1] : -Informed the patient that she should lose between 4-8lbs per month for optimal long-term and sustainable progress. Recommended she weigh herself once a week to track any changes.  -Recommended she stay on the Wegovy 0.25mg dosage for another 2 months.  -Recommended she drink plenty of water to help prevent constipation.  -Informed her that we would like to avoid putting her on ADD medication at this moment. Will reassess need for medication at her next visit. -Will renew the patients Fetzima 40mg, Amlodipine 5mg, and Cyanocobalamin 1000mcg.  -Will follow-up with the patient in 3 months for a weight check via telehealth or as needed.  Not applicable
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
## 2025-05-22 NOTE — ED ADULT NURSE NOTE - ED CARDIAC CAPILLARY REFILL
2 seconds or less
Detail Level: Detailed
Quality 130: Documentation Of Current Medications In The Medical Record: Current Medications Documented
Quality 431: Preventive Care And Screening: Unhealthy Alcohol Use - Screening: Patient not identified as an unhealthy alcohol user when screened for unhealthy alcohol use using a systematic screening method
Quality 226: Preventive Care And Screening: Tobacco Use: Screening And Cessation Intervention: Patient screened for tobacco use and is an ex/non-smoker
Quality 47: Advance Care Plan: Advance Care Planning discussed and documented; advance care plan or surrogate decision maker documented in the medical record.

## 2025-05-23 NOTE — ED PROVIDER NOTE - NEUROLOGICAL, MLM
Band-aid applied to site  Alert and oriented, no focal deficits, no motor or sensory deficits. Cn2-12 intact.

## (undated) DEVICE — POSITIONER FOAM ABDUCTION PILLOW MED (PINK)

## (undated) DEVICE — SUT POLYSORB 1 36" GS-21 UNDYED

## (undated) DEVICE — SUCTION YANKAUER NO CONTROL VENT

## (undated) DEVICE — LAP PAD 18 X 18"

## (undated) DEVICE — SUT TICRON 0 30" TIES

## (undated) DEVICE — SUT POLYSORB 2-0 30" GS-21 UNDYED

## (undated) DEVICE — WARMING BLANKET UPPER ADULT

## (undated) DEVICE — DRSG DERMABOND PRINEO 60CM

## (undated) DEVICE — FOLEY TRAY 16FR 5CC LTX UMETER CLOSED

## (undated) DEVICE — MEDICATION LABELS W MARKER

## (undated) DEVICE — VISITEC 4X4

## (undated) DEVICE — HOOD FLYTE STRYKER HELMET SHIELD

## (undated) DEVICE — PACK TOTAL HIP (2 PACKS)

## (undated) DEVICE — SOL IRR POUR H2O 1000ML

## (undated) DEVICE — GLV 7 PROTEXIS (WHITE)

## (undated) DEVICE — SUT BIOSYN 4-0 18" P-12

## (undated) DEVICE — GLV 8.5 PROTEXIS (WHITE)

## (undated) DEVICE — GLV 7.5 PROTEXIS (WHITE)

## (undated) DEVICE — DRSG TAPE MICROFOAM 3"

## (undated) DEVICE — SUT ETHIBOND EXCEL 2 30" OS-4

## (undated) DEVICE — VENODYNE/SCD SLEEVE CALF LARGE

## (undated) DEVICE — DRAPE INSTRUMENT POUCH 6.75" X 11"

## (undated) DEVICE — SAW BLADE MICROAIRE RECIPROCATING 12.2MMX80MMX1.78MM

## (undated) DEVICE — GLV 6.5 PROTEXIS (WHITE)

## (undated) DEVICE — DRSG STERISTRIPS 0.5 X 4"

## (undated) DEVICE — SPECIMEN CONTAINER 100ML

## (undated) DEVICE — SOL IRR BAG NS 0.9% 3000ML

## (undated) DEVICE — SUT POLYSORB 0 30" GS-21 UNDYED

## (undated) DEVICE — DRAPE IOBAN 23" X 23"

## (undated) DEVICE — GOWN TRIMAX LG

## (undated) DEVICE — GLV 8 PROTEXIS (WHITE)

## (undated) DEVICE — PRESSURIZER FEM CNL W/O HUB MED

## (undated) DEVICE — Device

## (undated) DEVICE — POSITIONER FOAM EGG CRATE ULNAR 2PCS (PINK)

## (undated) DEVICE — GLV 9 DURAPRENE

## (undated) DEVICE — SOL IRR POUR NS 0.9% 500ML